# Patient Record
Sex: MALE | Race: WHITE | NOT HISPANIC OR LATINO | Employment: FULL TIME | URBAN - METROPOLITAN AREA
[De-identification: names, ages, dates, MRNs, and addresses within clinical notes are randomized per-mention and may not be internally consistent; named-entity substitution may affect disease eponyms.]

---

## 2017-12-19 ENCOUNTER — HOSPITAL ENCOUNTER (EMERGENCY)
Facility: CLINIC | Age: 52
Discharge: HOME | End: 2017-12-20
Attending: EMERGENCY MEDICINE | Admitting: EMERGENCY MEDICINE
Payer: COMMERCIAL

## 2017-12-19 DIAGNOSIS — R17 JAUNDICE: ICD-10-CM

## 2017-12-19 DIAGNOSIS — R79.89 ELEVATED LFTS: ICD-10-CM

## 2017-12-19 DIAGNOSIS — K80.20 GALLSTONES: ICD-10-CM

## 2017-12-19 DIAGNOSIS — R10.13 ABDOMINAL PAIN, EPIGASTRIC: ICD-10-CM

## 2017-12-19 LAB
ALBUMIN SERPL-MCNC: 3.3 G/DL (ref 3.4–5)
ALP SERPL-CCNC: 379 U/L (ref 40–150)
ALT SERPL W P-5'-P-CCNC: 453 U/L (ref 0–70)
AMMONIA PLAS-SCNC: 38 UMOL/L (ref 10–50)
ANION GAP SERPL CALCULATED.3IONS-SCNC: 5 MMOL/L (ref 3–14)
APTT PPP: 34 SEC (ref 22–37)
AST SERPL W P-5'-P-CCNC: 283 U/L (ref 0–45)
BASOPHILS # BLD AUTO: 0.1 10E9/L (ref 0–0.2)
BASOPHILS NFR BLD AUTO: 0.9 %
BILIRUB SERPL-MCNC: 2 MG/DL (ref 0.2–1.3)
BUN SERPL-MCNC: 10 MG/DL (ref 7–30)
CALCIUM SERPL-MCNC: 8.5 MG/DL (ref 8.5–10.1)
CHLORIDE SERPL-SCNC: 108 MMOL/L (ref 94–109)
CO2 SERPL-SCNC: 28 MMOL/L (ref 20–32)
CREAT SERPL-MCNC: 0.77 MG/DL (ref 0.66–1.25)
DIFFERENTIAL METHOD BLD: NORMAL
EOSINOPHIL # BLD AUTO: 0.2 10E9/L (ref 0–0.7)
EOSINOPHIL NFR BLD AUTO: 4 %
ERYTHROCYTE [DISTWIDTH] IN BLOOD BY AUTOMATED COUNT: 15 % (ref 10–15)
ETHANOL SERPL-MCNC: <0.01 G/DL
GFR SERPL CREATININE-BSD FRML MDRD: >90 ML/MIN/1.7M2
GLUCOSE SERPL-MCNC: 139 MG/DL (ref 70–99)
HCT VFR BLD AUTO: 42.3 % (ref 40–53)
HGB BLD-MCNC: 14.5 G/DL (ref 13.3–17.7)
IMM GRANULOCYTES # BLD: 0 10E9/L (ref 0–0.4)
IMM GRANULOCYTES NFR BLD: 0.3 %
INR PPP: 0.89 (ref 0.86–1.14)
LIPASE SERPL-CCNC: 187 U/L (ref 73–393)
LYMPHOCYTES # BLD AUTO: 1.7 10E9/L (ref 0.8–5.3)
LYMPHOCYTES NFR BLD AUTO: 29.7 %
MCH RBC QN AUTO: 30.1 PG (ref 26.5–33)
MCHC RBC AUTO-ENTMCNC: 34.3 G/DL (ref 31.5–36.5)
MCV RBC AUTO: 88 FL (ref 78–100)
MONOCYTES # BLD AUTO: 0.4 10E9/L (ref 0–1.3)
MONOCYTES NFR BLD AUTO: 7.3 %
NEUTROPHILS # BLD AUTO: 3.3 10E9/L (ref 1.6–8.3)
NEUTROPHILS NFR BLD AUTO: 57.8 %
NRBC # BLD AUTO: 0 10*3/UL
NRBC BLD AUTO-RTO: 0 /100
PLATELET # BLD AUTO: 261 10E9/L (ref 150–450)
POTASSIUM SERPL-SCNC: 3.9 MMOL/L (ref 3.4–5.3)
PROT SERPL-MCNC: 6.8 G/DL (ref 6.8–8.8)
RBC # BLD AUTO: 4.81 10E12/L (ref 4.4–5.9)
SODIUM SERPL-SCNC: 141 MMOL/L (ref 133–144)
TROPONIN I BLD-MCNC: 0 UG/L (ref 0–0.1)
WBC # BLD AUTO: 5.8 10E9/L (ref 4–11)

## 2017-12-19 PROCEDURE — 85730 THROMBOPLASTIN TIME PARTIAL: CPT | Performed by: EMERGENCY MEDICINE

## 2017-12-19 PROCEDURE — 99285 EMERGENCY DEPT VISIT HI MDM: CPT | Mod: 25

## 2017-12-19 PROCEDURE — 93005 ELECTROCARDIOGRAM TRACING: CPT

## 2017-12-19 PROCEDURE — 85025 COMPLETE CBC W/AUTO DIFF WBC: CPT | Performed by: EMERGENCY MEDICINE

## 2017-12-19 PROCEDURE — 84484 ASSAY OF TROPONIN QUANT: CPT

## 2017-12-19 PROCEDURE — 82140 ASSAY OF AMMONIA: CPT | Performed by: EMERGENCY MEDICINE

## 2017-12-19 PROCEDURE — 80053 COMPREHEN METABOLIC PANEL: CPT | Performed by: EMERGENCY MEDICINE

## 2017-12-19 PROCEDURE — 80329 ANALGESICS NON-OPIOID 1 OR 2: CPT | Performed by: EMERGENCY MEDICINE

## 2017-12-19 PROCEDURE — 80320 DRUG SCREEN QUANTALCOHOLS: CPT | Performed by: EMERGENCY MEDICINE

## 2017-12-19 PROCEDURE — 83690 ASSAY OF LIPASE: CPT | Performed by: EMERGENCY MEDICINE

## 2017-12-19 PROCEDURE — 85610 PROTHROMBIN TIME: CPT | Performed by: EMERGENCY MEDICINE

## 2017-12-19 RX ORDER — LIDOCAINE 40 MG/G
CREAM TOPICAL
Status: DISCONTINUED | OUTPATIENT
Start: 2017-12-19 | End: 2017-12-20 | Stop reason: HOSPADM

## 2017-12-19 ASSESSMENT — ENCOUNTER SYMPTOMS
JOINT SWELLING: 0
DIARRHEA: 1
VOMITING: 1
SEIZURES: 0
DIZZINESS: 0
MYALGIAS: 1
DIFFICULTY URINATING: 0
CONSTIPATION: 0
DYSURIA: 0
FATIGUE: 0
BACK PAIN: 0
NECK STIFFNESS: 0
PALPITATIONS: 0
NECK PAIN: 0
HEADACHES: 0
ANAL BLEEDING: 0
RECTAL PAIN: 0
PHOTOPHOBIA: 0
NAUSEA: 1
COUGH: 0
FEVER: 1
ARTHRALGIAS: 0
COLOR CHANGE: 0
CHEST TIGHTNESS: 0
HEMATURIA: 0
ABDOMINAL PAIN: 0
SHORTNESS OF BREATH: 0
BLOOD IN STOOL: 0
CHILLS: 0

## 2017-12-19 NOTE — ED AVS SNAPSHOT
[facility] Emergency Department    [address]    [facility] [address]    Phone:  [phone]    Fax:  [phone]                                       Contreras Pearson   MRN: [id number]    Department:  [facility] Emergency Department   Date of Visit:  12/19/2017           After Visit Summary Signature Page     I have received my discharge instructions, and my questions have been answered. I have discussed any challenges I see with this plan with the nurse or doctor.    ..........................................................................................................................................  Patient/Patient Representative Signature      ..........................................................................................................................................  Patient Representative Print Name and Relationship to Patient    ..................................................               ................................................  Date                                            Time    ..........................................................................................................................................  Reviewed by Signature/Title    ...................................................              ..............................................  Date                                                            Time

## 2017-12-19 NOTE — ED AVS SNAPSHOT
[facility] Emergency Department    [address]    [facility] [address]    Phone:  [phone]    Fax:  [phone]                                       Contreras Pearson   MRN: [id number]    Department:  [facility] Emergency Department   Date of Visit:  12/19/2017           Patient Information     Date Of Birth          1965        Your diagnoses for this visit were:     Gallstones     Jaundice     Elevated LFTs     Abdominal pain, epigastric        You were seen by Masoud Le MD.      Follow-up Information     Follow up with [facility], [facility]. Schedule an appointment as soon as possible for a visit in 2 days.    Contact information:    [address] . [facility] [id number] [phone]          Discharge Instructions       Discharge Instructions  Abdominal Pain    Abdominal pain can be caused by many things. Your evaluation today does not show the exact cause for your pain. Your doctor today has decided that it is unlikely your pain is due to a life threatening problem, or a problem requiring surgery or hospital admission. Sometimes those problems cannot be found right away, so it is very important that you follow up as directed.  Sometimes only the changes which occur over time allow the cause of your pain to be found.    Return to the Emergency Department for a recheck in 8-12 hours if your pain continues.  If your pain gets worse, changes in location, or feels different, return to the Emergency Department right away.    ADULTS:  Return to the Emergency Department right away if:      You get an oral temperature above 102oF or as directed by your doctor.    You have blood in your stools (bright red or black, tarry stools).    You keep throwing up or can t drink liquids.    You see blood when you throw up.    You can t have a bowel movement or you can t pass gas.    Your stomach gets bloated or bigger.    Your skin or the whites of your eyes look  yellow.    You faint.    You have bloody, frequent or painful urination.    You have new symptoms or anything that worries you.    CHILDREN:  Return to the Emergency Department right away if your child has any of the above-listed symptoms or the following:      Pushes your hand away or screams/cries when his/her belly is touched.    You notice your child is very fussy or weak.    Your child is very tired and is too tired to eat or drink.    Your child is dehydrated.  Signs of dehydration can be:  o Your infant has had no wet diapers in 4-5 hours.  o Your older child has not passed urine in 6-8 hours.  o Your infant or child starts to have dry mouth and lips, or no saliva or tears.    PREGNANT WOMEN:  Return to the Emergency Department right away if you have any of the above-listed symptoms or the following:      You have bleeding, leaking fluid or passing tissue from the vagina.    You have worse pain or cramping, or pain in your shoulder or back.    You have vomiting that will not stop.    You have painful or bloody urination.    You have a temperature of 100oF or more.    Your baby is not moving as much as usual.    You faint.    You get a bad headache with or without eye problems and abdominal pain.    You have a convulsion or seizure.    You have unusual discharge from your vagina and abdominal pain.    Abdominal pain is pretty common during pregnancy.  Your pain may or may not be related to your pregnancy. You should follow-up closely with your OB doctor so they can evaluate you and your baby.  Until you follow-up with your regular doctor, do the following:       Avoid sex and do not put anything in your vagina.    Drink clear fluids.    Only take medications approved by your doctor.    MORE INFORMATION:    Appendicitis:  A possible cause of abdominal pain in any person who still has their appendix is acute appendicitis. Appendicitis is often hard to diagnose.  Testing does not always rule out early  "appendicitis or other causes of abdominal pain. Close follow-up with your doctor and re-evaluations may be needed to figure out the reason for your abdominal pain.    Follow-up:  It is very important that you make an appointment with your clinic and go to the appointment.  If you do not follow-up with your primary doctor, it may result in missing an important development which could result in permanent injury or disability and/or lasting pain.  If there is any problem keeping your appointment, call your doctor or return to the Emergency Department.    Medications:  Take your medications as directed by your doctor today.  Before using over-the-counter medications, ask your doctor and make sure to take the medications as directed.  If you have any questions about medications, ask your doctor.    Diet:  Resume your normal diet as much as possible, but do not eat fried, fatty or spicy foods while you have pain.  Do not drink alcohol or have caffeine.  Do not smoke tobacco.    Probiotics: If you have been given an antibiotic, you may want to also take a probiotic pill or eat yogurt with live cultures. Probiotics have \"good bacteria\" to help your intestines stay healthy. Studies have shown that probiotics help prevent diarrhea and other intestine problems (including C. diff infection) when you take antibiotics. You can buy these without a prescription in the pharmacy section of the store.     If you were given a prescription for medicine here today, be sure to read all of the information (including the package insert) that comes with your prescription.  This will include important information about the medicine, its side effects, and any warnings that you need to know about.  The pharmacist who fills the prescription can provide more information and answer questions you may have about the medicine.  If you have questions or concerns that the pharmacist cannot address, please call or return to the Emergency Department. "         Opioid Medication Information    Pain medications are among the most commonly prescribed medicines, so we are including this information for all our patients. If you did not receive pain medication or get a prescription for pain medicine, you can ignore it.     You may have been given a prescription for an opioid (narcotic) pain medicine and/or have received a pain medicine while here in the Emergency Department. These medicines can make you drowsy or impaired. You must not drive, operate dangerous equipment, or engage in any other dangerous activities while taking these medications. If you drive while taking these medications, you could be arrested for DUI, or driving under the influence. Do not drink any alcohol while you are taking these medications.     Opioid pain medications can cause addiction. If you have a history of chemical dependency of any type, you are at a higher risk of becoming addicted to pain medications.  Only take these prescribed medications to treat your pain when all other options have been tried. Take it for as short a time and as few doses as possible. Store your pain pills in a secure place, as they are frequently stolen and provide a dangerous opportunity for children or visitors in your house to start abusing these powerful medications. We will not replace any lost or stolen medicine.  As soon as your pain is better, you should flush all your remaining medication.     Many prescription pain medications contain Tylenol  (acetaminophen), including Vicodin , Tylenol #3 , Norco , Lortab , and Percocet .  You should not take any extra pills of Tylenol  if you are using these prescription medications or you can get very sick.  Do not ever take more than 3000 mg of acetaminophen in any 24 hour period.    All opioids tend to cause constipation. Drink plenty of water and eat foods that have a lot of fiber, such as fruits, vegetables, prune juice, apple juice and high fiber cereal.  Take a  laxative if you don t move your bowels at least every other day. Miralax , Milk of Magnesia, Colace , or Senna  can be used to keep you regular.      Remember that you can always come back to the Emergency Department if you are not able to see your regular doctor in the amount of time listed above, if you get any new symptoms, or if there is anything that worries you.          24 Hour Appointment Hotline       To make an appointment at any [facility], call [phone] ([phone]). If you don't have a family doctor or clinic, we will help you find one. [facility] clinics are conveniently located to serve the needs of you and your family.             Review of your medicines      Our records show that you are taking the medicines listed below. If these are incorrect, please call your family doctor or clinic.        Dose / Directions Last dose taken    PANTOPRAZOLE SODIUM PO        Refills:  0        SUCRALFATE PO        Refills:  0                Procedures and tests performed during your visit     Abdomen US, limited (RUQ only)    Acetaminophen level    Alcohol ethyl    Ammonia    CBC with platelets differential    CT Abdomen Pelvis w Contrast    Comprehensive metabolic panel    EKG 12-lead, tracing only    INR    Lipase    Partial thromboplastin time    Troponin POCT    UA with Microscopic    XR Chest Port 1 View      Orders Needing Specimen Collection     None      Pending Results     Date and Time Order Name Status Description    12/19/2017 2314 EKG 12-lead, tracing only Preliminary             Pending Culture Results     No orders found for last 3 day(s).            Pending Results Instructions     If you had any lab results that were not finalized at the time of your Discharge, you can call the ED Lab Result RN at [phone]. You will be contacted by this team for any positive Lab results or changes in treatment. The nurses are available 7 days a week from 10A to 6:30P.  You can leave a message 24  hours per day and they will return your call.        Test Results From Your Hospital Stay        12/19/2017 11:32 PM      Component Results     Component Value Ref Range & Units Status    WBC 5.8 4.0 - 11.0 10e9/L Final    RBC Count 4.81 4.4 - 5.9 10e12/L Final    Hemoglobin 14.5 13.3 - 17.7 g/dL Final    Hematocrit 42.3 40.0 - 53.0 % Final    MCV 88 78 - 100 fl Final    MCH 30.1 26.5 - 33.0 pg Final    MCHC 34.3 31.5 - 36.5 g/dL Final    RDW 15.0 10.0 - 15.0 % Final    Platelet Count 261 150 - 450 10e9/L Final    Diff Method Automated Method  Final    % Neutrophils 57.8 % Final    % Lymphocytes 29.7 % Final    % Monocytes 7.3 % Final    % Eosinophils 4.0 % Final    % Basophils 0.9 % Final    % Immature Granulocytes 0.3 % Final    Nucleated RBCs 0 0 /100 Final    Absolute Neutrophil 3.3 1.6 - 8.3 10e9/L Final    Absolute Lymphocytes 1.7 0.8 - 5.3 10e9/L Final    Absolute Monocytes 0.4 0.0 - 1.3 10e9/L Final    Absolute Eosinophils 0.2 0.0 - 0.7 10e9/L Final    Absolute Basophils 0.1 0.0 - 0.2 10e9/L Final    Abs Immature Granulocytes 0.0 0 - 0.4 10e9/L Final    Absolute Nucleated RBC 0.0  Final         12/19/2017 11:48 PM      Component Results     Component Value Ref Range & Units Status    INR 0.89 0.86 - 1.14 Final         12/19/2017 11:48 PM      Component Results     Component Value Ref Range & Units Status    PTT 34 22 - 37 sec Final         12/19/2017 11:50 PM      Component Results     Component Value Ref Range & Units Status    Sodium 141 133 - 144 mmol/L Final    Potassium 3.9 3.4 - 5.3 mmol/L Final    Chloride 108 94 - 109 mmol/L Final    Carbon Dioxide 28 20 - 32 mmol/L Final    Anion Gap 5 3 - 14 mmol/L Final    Glucose 139 (H) 70 - 99 mg/dL Final    Urea Nitrogen 10 7 - 30 mg/dL Final    Creatinine 0.77 0.66 - 1.25 mg/dL Final    GFR Estimate >90 >60 mL/min/1.7m2 Final    Non  GFR Calc    GFR Estimate If Black >90 >60 mL/min/1.7m2 Final    African American GFR Calc    Calcium 8.5 8.5 -  10.1 mg/dL Final    Bilirubin Total 2.0 (H) 0.2 - 1.3 mg/dL Final    Albumin 3.3 (L) 3.4 - 5.0 g/dL Final    Protein Total 6.8 6.8 - 8.8 g/dL Final    Alkaline Phosphatase 379 (H) 40 - 150 U/L Final     (H) 0 - 70 U/L Final     (H) 0 - 45 U/L Final         12/19/2017 11:50 PM      Component Results     Component Value Ref Range & Units Status    Lipase 187 73 - 393 U/L Final         12/19/2017 11:47 PM      Component Results     Component Value Ref Range & Units Status    Ammonia 38 10 - 50 umol/L Final         12/19/2017 11:50 PM      Component Results     Component Value Ref Range & Units Status    Ethanol g/dL <0.01 <0.01 g/dL Final         12/20/2017 12:28 AM      Component Results     Component Value Ref Range & Units Status    Color Urine Claudia  Final    Appearance Urine Clear  Final    Glucose Urine Negative NEG^Negative mg/dL Final    Bilirubin Urine Negative NEG^Negative Final    Ketones Urine Negative NEG^Negative mg/dL Final    Specific Gravity Urine 1.016 1.003 - 1.035 Final    Blood Urine Negative NEG^Negative Final    pH Urine 5.0 5.0 - 7.0 pH Final    Protein Albumin Urine Negative NEG^Negative mg/dL Final    Urobilinogen mg/dL 0.0 0.0 - 2.0 mg/dL Final    Nitrite Urine Negative NEG^Negative Final    Leukocyte Esterase Urine Negative NEG^Negative Final    Source Midstream Urine  Final    WBC Urine 1 0 - 2 /HPF Final    RBC Urine 0 0 - 2 /HPF Final    Mucous Urine Present (A) NEG^Negative /LPF Final         12/20/2017  1:29 AM      Narrative     ULTRASOUND ABDOMEN LIMITED RIGHT UPPER QUADRANT  12/20/2017 1:10 AM     HISTORY: Jaundice. Abdominal pain.    COMPARISON: None.    FINDINGS: Normal hepatic echogenicity. No hepatic masses. 0.7 cm  gallstone in the gallbladder. No gallbladder wall thickening or  pericholecystic fluid. No focal tenderness over the gallbladder. No  intra- or extrahepatic biliary dilatation. The common duct measures  0.6 cm in diameter, at the upper limits of normal  in caliber. The  right kidney has normal size and echogenicity, measuring 12.3 cm in  length. No right intrarenal collecting system dilatation, calculi or  masses. No free fluid in the upper right hemiabdomen.        Impression     IMPRESSION:  1. Small gallstone in the gallbladder. No evidence of acute  cholecystitis.  2. No biliary dilatation.    ADOLPH TINOCO MD         12/20/2017  1:47 AM      Narrative     CHEST SINGLE VIEW   12/20/2017 1:22 AM     HISTORY: Jaundice.    COMPARISON: None.    FINDINGS: The lungs are clear. Normal-sized cardiac silhouette.        Impression     IMPRESSION: No evidence of active cardiopulmonary disease.    ADOLPH TINOCO MD         12/19/2017 11:40 PM      Component Results     Component Value Ref Range & Units Status    Troponin I 0.00 0.00 - 0.10 ug/L Final         12/20/2017  1:02 AM      Component Results     Component Value Ref Range & Units Status    Acetaminophen Level <2 mg/L Final    Therapeutic range: 10-20 mg/L         12/20/2017  3:44 AM      Narrative     CT ABDOMEN AND PELVIS WITH CONTRAST  12/20/2017 3:12 AM     HISTORY: Epigastric abdominal pain. Jaundice. Elevated liver function  tests.    COMPARISON: 12/20/2017 - Ultrasound right upper quadrant.    TECHNIQUE: Following the uneventful administration of 100 mL  Isovue-370 intravenous contrast, helical sections were acquired from  the top of the diaphragm through the pubic symphysis. Coronal  reconstructions were generated. Radiation dose for this scan was  reduced using automated exposure control, adjustment of the mA and/or  kV according to the patient's size, or iterative reconstruction  technique.    FINDINGS:  Abdomen: The liver, spleen, pancreas, adrenal glands and right kidney  are unremarkable. Subcentimeter low attenuation lesion in the inferior  pole of the left kidney, too small to characterize. Tiny 0.5 cm  gallstone in the gallbladder. No intra- or extrahepatic biliary  dilatation. No enlarged lymph  nodes or free fluid in the upper  abdomen. Atherosclerotic calcification in the abdominal aorta.    Scan through the lower chest is unremarkable.    Pelvis: The small and large bowel are normal in caliber. A few colonic  diverticula are present without evidence of diverticulitis The  appendix is unremarkable. No bowel wall thickening, pneumatosis or  free intraperitoneal gas. No enlarged lymph nodes or free fluid in the  pelvis.        Impression     IMPRESSION:  1. No cause of acute pain identified in the abdomen or pelvis.  2. No evidence of biliary obstruction.  3. Tiny gallstone in the gallbladder.    ADOLPH TINOCO MD                Clinical Quality Measure: Blood Pressure Screening     Your blood pressure was checked while you were in the emergency department today. The last reading we obtained was  BP: 128/86 . Please read the guidelines below about what these numbers mean and what you should do about them.  If your systolic blood pressure (the top number) is less than 120 and your diastolic blood pressure (the bottom number) is less than 80, then your blood pressure is normal. There is nothing more that you need to do about it.  If your systolic blood pressure (the top number) is 120-139 or your diastolic blood pressure (the bottom number) is 80-89, your blood pressure may be higher than it should be. You should have your blood pressure rechecked within a year by a primary care provider.  If your systolic blood pressure (the top number) is 140 or greater or your diastolic blood pressure (the bottom number) is 90 or greater, you may have high blood pressure. High blood pressure is treatable, but if left untreated over time it can put you at risk for heart attack, stroke, or kidney failure. You should have your blood pressure rechecked by a primary care provider within the next 4 weeks.  If your provider in the emergency department today gave you specific instructions to follow-up with your doctor or provider  "even sooner than that, you should follow that instruction and not wait for up to 4 weeks for your follow-up visit.        Thank you for choosing [facility]       Thank you for choosing [facility] for your care. Our goal is always to provide you with excellent care. Hearing back from our patients is one way we can continue to improve our services. Please take a few minutes to complete the written survey that you may receive in the mail after you visit with us. Thank you!        [employer] Information     [employer] lets you send messages to your doctor, view your test results, renew your prescriptions, schedule appointments and more. To sign up, go to [email or web address] . Click on \"Log in\" on the left side of the screen, which will take you to the Welcome page. Then click on \"Sign up Now\" on the right side of the page.     You will be asked to enter the access code listed below, as well as some personal information. Please follow the directions to create your username and password.     Your access code is: [email or web address]  Expires: 3/20/2018  4:10 AM     Your access code will  in 90 days. If you need help or a new code, please call your [facility] clinic or [phone].        Care EveryWhere ID     This is your Care EveryWhere ID. This could be used by other organizations to access your [facility] medical records  [id number]        Equal Access to Services     TIFFANIE MUÑIZ : Hadii sonya Lea, waaxda ignacio, qaybta kaalmada ankur, mami rodrigues. So [facility] [phone].    ATENCIÓN: Si habla español, tiene a anne disposición servicios gratuitos de asistencia lingüística. Nikhil al [phone].    We comply with applicable federal civil rights laws and [place] laws. We do not discriminate on the basis of race, color, national origin, age, disability, sex, sexual orientation, or gender identity.            After Visit Summary       This is your record. Keep this with you and " show to your community pharmacist(s) and doctor(s) at your next visit.

## 2017-12-20 ENCOUNTER — APPOINTMENT (OUTPATIENT)
Dept: ULTRASOUND IMAGING | Facility: CLINIC | Age: 52
End: 2017-12-20
Attending: EMERGENCY MEDICINE
Payer: COMMERCIAL

## 2017-12-20 ENCOUNTER — APPOINTMENT (OUTPATIENT)
Dept: GENERAL RADIOLOGY | Facility: CLINIC | Age: 52
End: 2017-12-20
Attending: EMERGENCY MEDICINE
Payer: COMMERCIAL

## 2017-12-20 ENCOUNTER — APPOINTMENT (OUTPATIENT)
Dept: CT IMAGING | Facility: CLINIC | Age: 52
End: 2017-12-20
Attending: EMERGENCY MEDICINE
Payer: COMMERCIAL

## 2017-12-20 VITALS
DIASTOLIC BLOOD PRESSURE: 89 MMHG | SYSTOLIC BLOOD PRESSURE: 138 MMHG | TEMPERATURE: 98.8 F | HEART RATE: 77 BPM | RESPIRATION RATE: 18 BRPM | OXYGEN SATURATION: 96 %

## 2017-12-20 LAB
ALBUMIN UR-MCNC: NEGATIVE MG/DL
APAP SERPL-MCNC: <2 MG/L (ref 10–20)
APPEARANCE UR: CLEAR
BILIRUB UR QL STRIP: NEGATIVE
COLOR UR AUTO: ABNORMAL
GLUCOSE UR STRIP-MCNC: NEGATIVE MG/DL
HGB UR QL STRIP: NEGATIVE
INTERPRETATION ECG - MUSE: NORMAL
KETONES UR STRIP-MCNC: NEGATIVE MG/DL
LEUKOCYTE ESTERASE UR QL STRIP: NEGATIVE
MUCOUS THREADS #/AREA URNS LPF: PRESENT /LPF
NITRATE UR QL: NEGATIVE
PH UR STRIP: 5 PH (ref 5–7)
RBC #/AREA URNS AUTO: 0 /HPF (ref 0–2)
SOURCE: ABNORMAL
SP GR UR STRIP: 1.02 (ref 1–1.03)
UROBILINOGEN UR STRIP-MCNC: 0 MG/DL (ref 0–2)
WBC #/AREA URNS AUTO: 1 /HPF (ref 0–2)

## 2017-12-20 PROCEDURE — 74177 CT ABD & PELVIS W/CONTRAST: CPT

## 2017-12-20 PROCEDURE — 25000128 H RX IP 250 OP 636: Performed by: EMERGENCY MEDICINE

## 2017-12-20 PROCEDURE — 76705 ECHO EXAM OF ABDOMEN: CPT

## 2017-12-20 PROCEDURE — 80329 ANALGESICS NON-OPIOID 1 OR 2: CPT | Performed by: EMERGENCY MEDICINE

## 2017-12-20 PROCEDURE — 81001 URINALYSIS AUTO W/SCOPE: CPT | Performed by: EMERGENCY MEDICINE

## 2017-12-20 PROCEDURE — 71010 XR CHEST PORT 1 VW: CPT

## 2017-12-20 RX ORDER — IOPAMIDOL 755 MG/ML
500 INJECTION, SOLUTION INTRAVASCULAR ONCE
Status: COMPLETED | OUTPATIENT
Start: 2017-12-20 | End: 2017-12-20

## 2017-12-20 RX ADMIN — SODIUM CHLORIDE 65 ML: 9 INJECTION, SOLUTION INTRAVENOUS at 03:00

## 2017-12-20 RX ADMIN — IOPAMIDOL 100 ML: 755 INJECTION, SOLUTION INTRAVENOUS at 03:00

## 2017-12-20 NOTE — ED PROVIDER NOTES
"  History     Chief Complaint:  Jaundice    HPI   Contreras Peasron is a 52 year old male who presents with jaundice. The patient reports that he has a history of acid reflux and has been worked up for this in the past which showed no cardiac complications. The patient stets that he chronically gets acid reflux in his upper abdomen/middle chest at a 1/10 which goes up to a 3/10. As of recently he has developed mid sternal chest pains which he states is different from his chronic acid reflux. He states that this pain has been accompanied by nausea, weight loss, diarrhea, vomiting, subjective fevers, and general body aches. He states [place] [facility] [place] blood [place]; the urine showed that he was \"eating his own proteins\" and his blood work showed liver inflammation. He was referred to come to the ER if he developed jaundice of the skin or eyes. Today he presents with jaundice to both of these areas but denies any other symptom other than the aforementioned.     CMP Clinic 12/15/17:  Bilirubin  3.4 (H)  ALKPHOS 316  ALT  421  AST  206    Allergies:  Penicillins  Sulfa Drugs     Medications:    SUCRALFATE PO  PANTOPRAZOLE SODIUM PO    Past Medical History:    Acid Reflux    Past Surgical History:    No pertinent past surgical history.    Family History:    No pertinent family history.    Social History:  Smoking status: Current smoker  Alcohol use: Yes (very minimal)  Marital Status:       Review of Systems   Constitutional: Positive for fever. Negative for chills and fatigue.   Eyes: Negative for photophobia and visual disturbance.   Respiratory: Negative for cough, chest tightness and shortness of breath.    Cardiovascular: Positive for chest pain. Negative for palpitations and leg swelling.   Gastrointestinal: Positive for diarrhea, nausea and vomiting. Negative for abdominal pain, anal bleeding, blood in stool, constipation and rectal pain.   Genitourinary: Negative for decreased urine " volume, difficulty urinating, dysuria, hematuria, scrotal swelling and testicular pain.   Musculoskeletal: Positive for myalgias. Negative for arthralgias, back pain, gait problem, joint swelling, neck pain and neck stiffness.   Skin: Negative for color change and rash.   Neurological: Negative for dizziness, seizures and headaches.   All other systems reviewed and are negative.    Physical Exam     Patient Vitals for the past 24 hrs:   BP Temp Temp src Pulse Resp SpO2   12/20/17 0416 - - - - - 96 %   12/20/17 0415 138/89 - - - - -   12/20/17 0400 128/86 - - - - -   12/20/17 0345 133/81 - - - - 94 %   12/20/17 0330 137/85 - - - - 96 %   12/20/17 0326 - - - - - 98 %   12/20/17 0325 133/82 - - - - -   12/20/17 0230 - - - - - 97 %   12/19/17 2358 - - - - - 97 %   12/19/17 2356 116/81 - - - - 95 %   12/19/17 2343 - - - - - 96 %   12/19/17 2324 - - - - - 97 %   12/19/17 2311 - - - - - 99 %   12/19/17 2303 - - - - - 99 %   12/19/17 2301 126/85 - - - - -   12/19/17 2051 (!) 154/95 98.8  F (37.1  C) Temporal 77 18 100 %       Physical Exam  General: The patient is alert, in no respiratory distress.    HENT: Mucous membranes moist. Mild scleral icterus.     Cardiovascular: Regular rate and rhythm. Good pulses in all four extremities. Normal capillary refill and skin turgor.     Respiratory: Lungs are clear. No nasal flaring. No retractions. No wheezing, no crackles.    Gastrointestinal: Abdomen soft. No guarding, no rebound. No palpable hernias. Mild abdominal tenderness    Musculoskeletal: No gross deformity.     Skin: No rashes or petechiae.     Neurologic: The patient is alert and oriented x3. GCS 15. No testable cranial nerve deficit. Follows commands with clear and appropriate speech. Gives appropriate answers. Good strength in all extremities. No gross neurologic deficit. Gross sensation intact. Pupils are round and reactive. No meningismus.     Lymphatic: No cervical adenopathy. No lower extremity  swelling.    Psychiatric: The patient is non-tearful.      Emergency Department Course   ECG:  @ 2338  Indication: Jaundice  Vent. Rate 66 bpm. VA interval 146 ms. QRS duration 96 ms. QT/QTc 400/419 ms. P-R-T axis 60 20 25.   Normal sinus rhythm. Normal ECG.    Read @ 2348 by Dr. Le.    Imaging:  XR Chest Port 1 view  IMPRESSION:  1. No cause of acute pain identified in the abdomen or pelvis.  2. No evidence of biliary obstruction.  3. Tiny gallstone in the gallbladder.  Report per radiology.     Abdomen U, limited:  IMPRESSION:  1. Small gallstone in the gallbladder. No evidence of acute  cholecystitis.  2. No biliary dilatation.  Report per radiology.    Abdomen/Pelvis CT with IV contrast:  IMPRESSION:  1. No cause of acute pain identified in the abdomen or pelvis.  2. No evidence of biliary obstruction.  3. Tiny gallstone in the gallbladder.  Report per radiology.     Laboratory:  CBC:  WBC 5.8, HGB 14.5, , WNL  CMP: Glucose 139 (H), Bilirubin 2.0 (H), Albumin 3.3 (L), ALKPHOS 379 (H),  (H),  (H) otherwise WNL (Creatinine 0.77)  Lipase: 187  INR: 0.89  PTT: 34  Ammonia: 38   Acetaminophen: <2  (2300) BAC: <0.01  (232) Troponin POCT: 0.00    UA: Clear Claudia urine, Mucous Present, otherwise WNL    Interventions:  (0256) Normal Saline, 1 liter, IV bolus    Emergency Department Course:  Nursing notes and vitals reviewed.  (2305) I performed an exam of the patient as documented above.    Blood was drawn from the patient. This was sent for laboratory testing, findings above.   Urine sample was obtained and sent for laboratory analysis, findings above.  The patient was sent for a x-ray, ultrasound, and CT scan while in the emergency department, findings above.   EKG was done, interpretation as above.    (0214) I spoke with Dr. Garcia, the doctor on-call for GI about the patient's condition. She recommended a CT scan for the patient at this time.    Findings and plan explained to the patient.  Patient discharged home with instructions regarding supportive care, medications, and reasons to return. The importance of close follow-up was reviewed.   Impression & Plan    Medical Decision Making:  Contreras Pearson is a 52 year old male who has had elevated LFT's and urine bilirubin at his primary care doctor and was told to come to the ER if he developed jaundice. He currently has scleral icterus, complaining of abdominal pain, and what sounds like gastric reflux. I asked about alcohol use, checked a Tylenol level, there has been no trauma or direct trauma to the liver. I also consulted GI. The patient's workup here has been negative except for the elevated total bilirubin as well as LFT's. It does not appear to be hemolysis and his hemoglobin has been adequate and there has been no other symptoms to suggest this. There is a gallstone which may be causing intermittent obstruction but no signs of any obstructive process on the CT scan or ultrasound. Pain is under control and felt appropriate by myself, and GI as well, to have the patient follow up with GI and primary as an outpatient. He is doing quite well with no significant pain but he agrees to return of her develops any new or worsening symptoms.    Diagnosis:    ICD-10-CM   1. Gallstones K80.20   2. Jaundice R17   3. Elevated LFTs R79.89   4. Abdominal pain, epigastric R10.13       Disposition:  Patient is discharged to home.        I, José Manuel Schultz, am serving as a scribe on 12/19/2017 at 11:05 PM to personally document services performed by Dr. Le based on my observations and the provider's statements to me.      José Manuel Schultz  12/19/2017   [facility] EMERGENCY DEPARTMENT       Masoud Le MD  12/20/17 0451       Masoud Le MD  12/20/17 0452

## 2017-12-20 NOTE — ED NOTES
Patient comes in for evaluation of jaundice. Patient was seen by PCP (osmar) for epigastric pain, had lab work and was scheduled for an US, was called after lab results and was told his US was changed to a CT and to report to ED if he had any yellowing of skin. ABCs intact.

## 2017-12-21 ENCOUNTER — HOSPITAL ENCOUNTER (INPATIENT)
Facility: CLINIC | Age: 52
LOS: 1 days | Discharge: HOME | End: 2017-12-22
Attending: EMERGENCY MEDICINE | Admitting: INTERNAL MEDICINE
Payer: COMMERCIAL

## 2017-12-21 ENCOUNTER — APPOINTMENT (OUTPATIENT)
Dept: ULTRASOUND IMAGING | Facility: CLINIC | Age: 52
End: 2017-12-21
Attending: EMERGENCY MEDICINE
Payer: COMMERCIAL

## 2017-12-21 DIAGNOSIS — K80.50 CHOLEDOCHOLITHIASIS: ICD-10-CM

## 2017-12-21 DIAGNOSIS — K85.10 ACUTE BILIARY PANCREATITIS, UNSPECIFIED COMPLICATION STATUS: ICD-10-CM

## 2017-12-21 LAB
ALBUMIN SERPL-MCNC: 3.9 G/DL (ref 3.4–5)
ALBUMIN UR-MCNC: NEGATIVE MG/DL
ALP SERPL-CCNC: 497 U/L (ref 40–150)
ALT SERPL W P-5'-P-CCNC: 589 U/L (ref 0–70)
ANION GAP SERPL CALCULATED.3IONS-SCNC: 11 MMOL/L (ref 3–14)
APPEARANCE UR: CLEAR
AST SERPL W P-5'-P-CCNC: 357 U/L (ref 0–45)
BASOPHILS # BLD AUTO: 0.1 10E9/L (ref 0–0.2)
BASOPHILS NFR BLD AUTO: 0.5 %
BILIRUB SERPL-MCNC: 3.4 MG/DL (ref 0.2–1.3)
BILIRUB UR QL STRIP: NEGATIVE
BUN SERPL-MCNC: 11 MG/DL (ref 7–30)
CALCIUM SERPL-MCNC: 8.9 MG/DL (ref 8.5–10.1)
CHLORIDE SERPL-SCNC: 106 MMOL/L (ref 94–109)
CO2 SERPL-SCNC: 24 MMOL/L (ref 20–32)
COLOR UR AUTO: YELLOW
CREAT SERPL-MCNC: 0.9 MG/DL (ref 0.66–1.25)
DIFFERENTIAL METHOD BLD: ABNORMAL
EOSINOPHIL # BLD AUTO: 0.4 10E9/L (ref 0–0.7)
EOSINOPHIL NFR BLD AUTO: 1.5 %
ERYTHROCYTE [DISTWIDTH] IN BLOOD BY AUTOMATED COUNT: 15.4 % (ref 10–15)
GFR SERPL CREATININE-BSD FRML MDRD: 89 ML/MIN/1.7M2
GLUCOSE SERPL-MCNC: 232 MG/DL (ref 70–99)
GLUCOSE UR STRIP-MCNC: 50 MG/DL
HCT VFR BLD AUTO: 48.4 % (ref 40–53)
HGB BLD-MCNC: 16.5 G/DL (ref 13.3–17.7)
HGB UR QL STRIP: NEGATIVE
IMM GRANULOCYTES # BLD: 0.2 10E9/L (ref 0–0.4)
IMM GRANULOCYTES NFR BLD: 0.7 %
INR PPP: 0.89 (ref 0.86–1.14)
KETONES UR STRIP-MCNC: NEGATIVE MG/DL
LACTATE SERPL-SCNC: 1.4 MMOL/L (ref 0.4–2)
LEUKOCYTE ESTERASE UR QL STRIP: NEGATIVE
LIPASE SERPL-CCNC: ABNORMAL U/L (ref 73–393)
LYMPHOCYTES # BLD AUTO: 4.6 10E9/L (ref 0.8–5.3)
LYMPHOCYTES NFR BLD AUTO: 18.6 %
MCH RBC QN AUTO: 29.7 PG (ref 26.5–33)
MCHC RBC AUTO-ENTMCNC: 34.1 G/DL (ref 31.5–36.5)
MCV RBC AUTO: 87 FL (ref 78–100)
MONOCYTES # BLD AUTO: 1.6 10E9/L (ref 0–1.3)
MONOCYTES NFR BLD AUTO: 6.6 %
MUCOUS THREADS #/AREA URNS LPF: PRESENT /LPF
NEUTROPHILS # BLD AUTO: 17.6 10E9/L (ref 1.6–8.3)
NEUTROPHILS NFR BLD AUTO: 72.1 %
NITRATE UR QL: NEGATIVE
NRBC # BLD AUTO: 0 10*3/UL
NRBC BLD AUTO-RTO: 0 /100
PH UR STRIP: 5 PH (ref 5–7)
PLATELET # BLD AUTO: 375 10E9/L (ref 150–450)
PLATELET # BLD EST: ABNORMAL 10*3/UL
POTASSIUM SERPL-SCNC: 3.3 MMOL/L (ref 3.4–5.3)
PROT SERPL-MCNC: 7.7 G/DL (ref 6.8–8.8)
RBC # BLD AUTO: 5.56 10E12/L (ref 4.4–5.9)
RBC #/AREA URNS AUTO: 1 /HPF (ref 0–2)
RBC MORPH BLD: ABNORMAL
SODIUM SERPL-SCNC: 141 MMOL/L (ref 133–144)
SOURCE: ABNORMAL
SP GR UR STRIP: 1.01 (ref 1–1.03)
UROBILINOGEN UR STRIP-MCNC: 0 MG/DL (ref 0–2)
WBC # BLD AUTO: 24.5 10E9/L (ref 4–11)
WBC #/AREA URNS AUTO: <1 /HPF (ref 0–2)

## 2017-12-21 PROCEDURE — 12000000 ZZH R&B MED SURG/OB

## 2017-12-21 PROCEDURE — 96375 TX/PRO/DX INJ NEW DRUG ADDON: CPT

## 2017-12-21 PROCEDURE — 83605 ASSAY OF LACTIC ACID: CPT | Performed by: EMERGENCY MEDICINE

## 2017-12-21 PROCEDURE — 96361 HYDRATE IV INFUSION ADD-ON: CPT

## 2017-12-21 PROCEDURE — 96374 THER/PROPH/DIAG INJ IV PUSH: CPT

## 2017-12-21 PROCEDURE — 85025 COMPLETE CBC W/AUTO DIFF WBC: CPT | Performed by: EMERGENCY MEDICINE

## 2017-12-21 PROCEDURE — 27210995 ZZH RX 272: Performed by: EMERGENCY MEDICINE

## 2017-12-21 PROCEDURE — 85610 PROTHROMBIN TIME: CPT | Performed by: EMERGENCY MEDICINE

## 2017-12-21 PROCEDURE — 25000128 H RX IP 250 OP 636: Performed by: INTERNAL MEDICINE

## 2017-12-21 PROCEDURE — 93005 ELECTROCARDIOGRAM TRACING: CPT

## 2017-12-21 PROCEDURE — 99223 1ST HOSP IP/OBS HIGH 75: CPT | Mod: AI | Performed by: INTERNAL MEDICINE

## 2017-12-21 PROCEDURE — 76700 US EXAM ABDOM COMPLETE: CPT

## 2017-12-21 PROCEDURE — 80053 COMPREHEN METABOLIC PANEL: CPT | Performed by: EMERGENCY MEDICINE

## 2017-12-21 PROCEDURE — 25000128 H RX IP 250 OP 636: Performed by: EMERGENCY MEDICINE

## 2017-12-21 PROCEDURE — 83690 ASSAY OF LIPASE: CPT | Performed by: EMERGENCY MEDICINE

## 2017-12-21 PROCEDURE — 81001 URINALYSIS AUTO W/SCOPE: CPT | Performed by: EMERGENCY MEDICINE

## 2017-12-21 PROCEDURE — 99285 EMERGENCY DEPT VISIT HI MDM: CPT | Mod: 25

## 2017-12-21 PROCEDURE — 25000132 ZZH RX MED GY IP 250 OP 250 PS 637: Performed by: INTERNAL MEDICINE

## 2017-12-21 PROCEDURE — 96376 TX/PRO/DX INJ SAME DRUG ADON: CPT

## 2017-12-21 PROCEDURE — 25000125 ZZHC RX 250: Performed by: INTERNAL MEDICINE

## 2017-12-21 RX ORDER — SODIUM CHLORIDE 9 MG/ML
1000 INJECTION, SOLUTION INTRAVENOUS CONTINUOUS
Status: DISCONTINUED | OUTPATIENT
Start: 2017-12-21 | End: 2017-12-21

## 2017-12-21 RX ORDER — ONDANSETRON 4 MG/1
4 TABLET, ORALLY DISINTEGRATING ORAL EVERY 6 HOURS PRN
Status: DISCONTINUED | OUTPATIENT
Start: 2017-12-21 | End: 2017-12-22 | Stop reason: HOSPADM

## 2017-12-21 RX ORDER — DEXTROSE MONOHYDRATE, SODIUM CHLORIDE, AND POTASSIUM CHLORIDE 50; 1.49; 9 G/1000ML; G/1000ML; G/1000ML
INJECTION, SOLUTION INTRAVENOUS CONTINUOUS
Status: CANCELLED | OUTPATIENT
Start: 2017-12-21

## 2017-12-21 RX ORDER — PROCHLORPERAZINE 25 MG
25 SUPPOSITORY, RECTAL RECTAL EVERY 12 HOURS PRN
Status: DISCONTINUED | OUTPATIENT
Start: 2017-12-21 | End: 2017-12-22 | Stop reason: HOSPADM

## 2017-12-21 RX ORDER — PROCHLORPERAZINE MALEATE 5 MG
10 TABLET ORAL EVERY 6 HOURS PRN
Status: DISCONTINUED | OUTPATIENT
Start: 2017-12-21 | End: 2017-12-22 | Stop reason: HOSPADM

## 2017-12-21 RX ORDER — POTASSIUM CHLORIDE 1.5 G/1.58G
20-40 POWDER, FOR SOLUTION ORAL
Status: DISCONTINUED | OUTPATIENT
Start: 2017-12-21 | End: 2017-12-22 | Stop reason: HOSPADM

## 2017-12-21 RX ORDER — ONDANSETRON 2 MG/ML
4 INJECTION INTRAMUSCULAR; INTRAVENOUS EVERY 6 HOURS PRN
Status: DISCONTINUED | OUTPATIENT
Start: 2017-12-21 | End: 2017-12-22 | Stop reason: HOSPADM

## 2017-12-21 RX ORDER — MORPHINE SULFATE 4 MG/ML
4 INJECTION, SOLUTION INTRAMUSCULAR; INTRAVENOUS
Status: COMPLETED | OUTPATIENT
Start: 2017-12-21 | End: 2017-12-21

## 2017-12-21 RX ORDER — OXYCODONE HYDROCHLORIDE 5 MG/1
5-10 TABLET ORAL
Status: DISCONTINUED | OUTPATIENT
Start: 2017-12-21 | End: 2017-12-22 | Stop reason: HOSPADM

## 2017-12-21 RX ORDER — POTASSIUM CL/LIDO/0.9 % NACL 10MEQ/0.1L
10 INTRAVENOUS SOLUTION, PIGGYBACK (ML) INTRAVENOUS
Status: DISCONTINUED | OUTPATIENT
Start: 2017-12-21 | End: 2017-12-22 | Stop reason: HOSPADM

## 2017-12-21 RX ORDER — POTASSIUM CHLORIDE 7.45 MG/ML
10 INJECTION INTRAVENOUS
Status: DISCONTINUED | OUTPATIENT
Start: 2017-12-21 | End: 2017-12-22 | Stop reason: HOSPADM

## 2017-12-21 RX ORDER — HYDROMORPHONE HYDROCHLORIDE 1 MG/ML
.3-.5 INJECTION, SOLUTION INTRAMUSCULAR; INTRAVENOUS; SUBCUTANEOUS
Status: DISCONTINUED | OUTPATIENT
Start: 2017-12-21 | End: 2017-12-22 | Stop reason: HOSPADM

## 2017-12-21 RX ORDER — NALOXONE HYDROCHLORIDE 0.4 MG/ML
.1-.4 INJECTION, SOLUTION INTRAMUSCULAR; INTRAVENOUS; SUBCUTANEOUS
Status: DISCONTINUED | OUTPATIENT
Start: 2017-12-21 | End: 2017-12-22 | Stop reason: HOSPADM

## 2017-12-21 RX ORDER — SODIUM CHLORIDE 9 MG/ML
INJECTION, SOLUTION INTRAVENOUS CONTINUOUS
Status: DISCONTINUED | OUTPATIENT
Start: 2017-12-21 | End: 2017-12-22 | Stop reason: HOSPADM

## 2017-12-21 RX ORDER — POTASSIUM CHLORIDE 1500 MG/1
20-40 TABLET, EXTENDED RELEASE ORAL
Status: DISCONTINUED | OUTPATIENT
Start: 2017-12-21 | End: 2017-12-22 | Stop reason: HOSPADM

## 2017-12-21 RX ORDER — ONDANSETRON 2 MG/ML
4 INJECTION INTRAMUSCULAR; INTRAVENOUS EVERY 30 MIN PRN
Status: DISCONTINUED | OUTPATIENT
Start: 2017-12-21 | End: 2017-12-21

## 2017-12-21 RX ORDER — POTASSIUM CHLORIDE 29.8 MG/ML
20 INJECTION INTRAVENOUS
Status: DISCONTINUED | OUTPATIENT
Start: 2017-12-21 | End: 2017-12-22 | Stop reason: HOSPADM

## 2017-12-21 RX ORDER — NICOTINE 21 MG/24HR
1 PATCH, TRANSDERMAL 24 HOURS TRANSDERMAL DAILY
Status: DISCONTINUED | OUTPATIENT
Start: 2017-12-21 | End: 2017-12-22 | Stop reason: HOSPADM

## 2017-12-21 RX ADMIN — FAMOTIDINE 20 MG: 10 INJECTION, SOLUTION INTRAVENOUS at 20:56

## 2017-12-21 RX ADMIN — SODIUM CHLORIDE 1000 ML: 9 INJECTION, SOLUTION INTRAVENOUS at 17:08

## 2017-12-21 RX ADMIN — MORPHINE SULFATE 4 MG: 4 INJECTION INTRAVENOUS at 17:08

## 2017-12-21 RX ADMIN — SODIUM CHLORIDE: 9 INJECTION, SOLUTION INTRAVENOUS at 20:49

## 2017-12-21 RX ADMIN — MORPHINE SULFATE 4 MG: 4 INJECTION INTRAVENOUS at 17:40

## 2017-12-21 RX ADMIN — MORPHINE SULFATE 4 MG: 4 INJECTION INTRAVENOUS at 18:26

## 2017-12-21 RX ADMIN — Medication 10 MEQ: at 21:56

## 2017-12-21 RX ADMIN — SODIUM CHLORIDE 1000 ML: 9 INJECTION, SOLUTION INTRAVENOUS at 18:25

## 2017-12-21 RX ADMIN — NICOTINE 1 PATCH: 21 PATCH, EXTENDED RELEASE TRANSDERMAL at 22:00

## 2017-12-21 RX ADMIN — WATER 1 G: 1 INJECTION INTRAMUSCULAR; INTRAVENOUS; SUBCUTANEOUS at 18:38

## 2017-12-21 RX ADMIN — Medication 10 MEQ: at 22:57

## 2017-12-21 RX ADMIN — HYDROMORPHONE HYDROCHLORIDE 1 MG: 1 INJECTION, SOLUTION INTRAMUSCULAR; INTRAVENOUS; SUBCUTANEOUS at 19:53

## 2017-12-21 RX ADMIN — PROCHLORPERAZINE EDISYLATE 10 MG: 5 INJECTION INTRAMUSCULAR; INTRAVENOUS at 21:29

## 2017-12-21 RX ADMIN — HYDROMORPHONE HYDROCHLORIDE 0.5 MG: 1 INJECTION, SOLUTION INTRAMUSCULAR; INTRAVENOUS; SUBCUTANEOUS at 20:56

## 2017-12-21 RX ADMIN — ONDANSETRON 4 MG: 2 INJECTION INTRAMUSCULAR; INTRAVENOUS at 17:08

## 2017-12-21 RX ADMIN — HYDROMORPHONE HYDROCHLORIDE 0.5 MG: 1 INJECTION, SOLUTION INTRAMUSCULAR; INTRAVENOUS; SUBCUTANEOUS at 22:57

## 2017-12-21 ASSESSMENT — ACTIVITIES OF DAILY LIVING (ADL)
SWALLOWING: 0-->SWALLOWS FOODS/LIQUIDS WITHOUT DIFFICULTY
TRANSFERRING: 0-->INDEPENDENT
RETIRED_EATING: 0-->INDEPENDENT
TOILETING: 0-->INDEPENDENT
RETIRED_COMMUNICATION: 0-->UNDERSTANDS/COMMUNICATES WITHOUT DIFFICULTY
FALL_HISTORY_WITHIN_LAST_SIX_MONTHS: NO
DRESS: 0-->INDEPENDENT
AMBULATION: 0-->INDEPENDENT
BATHING: 0-->INDEPENDENT
COGNITION: 0 - NO COGNITION ISSUES REPORTED

## 2017-12-21 ASSESSMENT — ENCOUNTER SYMPTOMS
FEVER: 0
VOMITING: 1
DIARRHEA: 1
ABDOMINAL PAIN: 1
NAUSEA: 1
CHEST TIGHTNESS: 1

## 2017-12-21 NOTE — ED PROVIDER NOTES
History     Chief Complaint:  Abdominal pain    HPI   Contreras Pearson is a 52 year old male who presents with abdominal pain. The patient states that he has been experiencing multiple symptoms for the last few weeks including abdominal pain, nausea, diarrhea, and heart burn. He was then seen here in the ED 2 days ago for jaundice when he had a gallstone found via CT, ultrasound, and x ray. He was then at home this afternoon when shortly after eating a meal he had sudden onset of severe upper right quadrant abdominal pain with an episode of vomiting as well. He then called his wife and went to clinic where he was recommended to come here to the ED for further evaluation. While here he endorses the same symptoms he has been experiencing recently, though now much more severe. He denies any recent fevers however. He states that he has used the lidocaine he was prescribed for breakthrough pain with little relief.    Results from 12/19/17:  XR Chest Port 1 view  IMPRESSION:  1. No cause of acute pain identified in the abdomen or pelvis.  2. No evidence of biliary obstruction.  3. Tiny gallstone in the gallbladder.  Report per radiology.      Abdomen U, limited:  IMPRESSION:  1. Small gallstone in the gallbladder. No evidence of acute cholecystitis.  2. No biliary dilatation.  Report per radiology.     Abdomen/Pelvis CT with IV contrast:  IMPRESSION:  1. No cause of acute pain identified in the abdomen or pelvis.  2. No evidence of biliary obstruction.  3. Tiny gallstone in the gallbladder.  Report per radiology.     Allergies:  Penicillins  Sulfa drugs     Medications:    Sucralfate  Pantoprazole     Past Medical History:    The patient denies any relevant past medical history.    Past Surgical History:    History reviewed. No pertinent past surgical history.    Family History:    The patient denies any relevant family medical history.    Social History:  The patient was accompanied to the ED by his wife.  Smoking  Status: Yes  Smokeless Tobacco: No  Alcohol Use: No  Marital Status:       Review of Systems   Constitutional: Negative for fever.   Respiratory: Positive for chest tightness.    Gastrointestinal: Positive for abdominal pain, diarrhea, nausea and vomiting.   All other systems reviewed and are negative.    Physical Exam   Vitals:  Patient Vitals for the past 24 hrs:   BP Temp Temp src Pulse Heart Rate Resp SpO2 Weight   12/21/17 2130 - - - - - 18 - -   12/21/17 2056 - - - - - 20 - -   12/21/17 2054 163/80 - - - - - - -   12/21/17 2050 - - - - - 20 - -   12/21/17 2048 (!) 160/95 - - - - - - -   12/21/17 2044 (!) 156/94 95.4  F (35.2  C) Oral 84 - 16 90 % -   12/21/17 2015 (!) 171/97 - - - - - - -   12/21/17 2000 (!) 172/104 - - - - - - -   12/21/17 1945 (!) 180/110 - - - - - - -   12/21/17 1930 (!) 177/100 - - - - - - -   12/21/17 1915 (!) 216/185 - - - - - - -   12/21/17 1900 177/90 - - - - - - -   12/21/17 1845 (!) 175/95 - - - - - 92 % -   12/21/17 1830 (!) 161/93 - - - - - 95 % -   12/21/17 1815 (!) 172/96 - - - - - 96 % -   12/21/17 1800 (!) 163/97 - - - - - 97 % -   12/21/17 1745 157/89 - - - - - 99 % -   12/21/17 1715 155/90 - - - 84 27 98 % -   12/21/17 1700 (!) 182/101 97.8  F (36.6  C) Oral - 92 16 96 % -   12/21/17 1658 - - - 91 91 18 96 % 103.4 kg (228 lb)     Physical Exam   Constitutional: He appears well-developed and well-nourished. He appears distressed.   HENT:   Right Ear: External ear normal.   Left Ear: External ear normal.   Mouth/Throat: Oropharynx is clear and moist. No oropharyngeal exudate.   TM's clear bilaterally   Eyes: Conjunctivae are normal. Pupils are equal, round, and reactive to light. No scleral icterus.   Neck: Normal range of motion. Neck supple.   Cardiovascular: Normal rate, regular rhythm, normal heart sounds and intact distal pulses.  Exam reveals no gallop and no friction rub.    No murmur heard.  Pulmonary/Chest: Effort normal and breath sounds normal. No respiratory  distress. He has no wheezes. He has no rales.   Abdominal: Soft. Bowel sounds are normal. He exhibits no distension and no mass. There is tenderness (Right upper quadrant and epigastric TTP). There is rebound and guarding.   Musculoskeletal: Normal range of motion. He exhibits no edema.   Neurological: He is alert.   Skin: Skin is warm and dry. No rash noted.   Psychiatric:   Anxious, crying     Emergency Department Course     ECG:  ECG taken at 1656, ECG read at 1567  Normal sinus rhythm  Possible left atrial enlargement  Nonspecific ST abnormality  Abnormal ECG  Rate 92 bpm. AZ interval 130. QRS duration 94. QT/QTc 366/452. P-R-T axes 40 28 56.    Imaging:  Radiology findings were communicated with the patient who voiced understanding of the findings.  US Abdomen Complete:  1. Small gallstone seen one day ago by CT is either not sonographically visible or may have passed out of the gallbladder.  2. Common bile duct dilatation.    Per Radiologist.     Laboratory:  Laboratory findings were communicated with the patient who voiced understanding of the findings.  CBC: WBC: 24.5 (H) o/w WNL. (HGB 16.5, )   INR: 0.89  CMP: Potassium: 3.3 (L), Glucose: 232 (H), Bilirubin total: 3.4 (H), ALKPHOS: 497 (H), ALT: 589 (HH), AST: 357 (H) o/w WNL (Creatinine 0.90)  Lipase: 80302 (H)  UA: Glucose: 50 (A), Mucous: Present (A)  Lactic acid (collected at 1826): 1.4  INR: 0.89    Interventions:  1708 Normal Saline 1000 mL IV  1708 Zofran, 4 mg, IV  1740 Morphine, 4 mg, IV  1825 Normal Saline 1000 mL IV  1826 Morphine, 4 mg, IV  1838 InVanz, 1 g , IV  1953 Dilaudid, 1 mg, IV  2056 Pepcid, 20 mg, IV  2129 Compazine, 10 mg, PO  2129 Compazine, 25 mg, Rectal  2056 Dilaudid, 0.5 mg, IV     Emergency Department Course:  Nursing notes and vitals reviewed.  1654 I had my initial encounter with the patient.  I performed an exam of the patient as documented above.   EKG obtained in the ED, see results above.   IV was inserted and blood  was drawn for laboratory testing, results above.  The patient provided a urine sample here in the emergency department. This was sent for laboratory testing, findings above.  The patient was sent for a US while in the emergency department, results above.   I spoke with Dr. Hernandez of [facility] regarding this patient.  I spoke with Dr. Bangura regarding this patient.    I discussed the treatment plan with the patient. They expressed understanding of this plan and consented to admission. I discussed the patient with Dr Bangura, who will admit the patient to a monitored bed for further evaluation and treatment.    Impression & Plan      Medical Decision Making:  Contreras Pearson is a 52 year old male who presents to the emergency department today with severe right upper quadrant abdominal pain with onset around 3:30 this afternoon. The patient was seen two days ago by Dr. Le for jaundice and vague abdominal discomfort. At that time he did have elevated LFTs, mild bilirubin elevation, and a small gallstone. We did a repeat ultrasound. His labs are much worse, including increased elevation of bilirubin. He also has a white count of 24.5. He has been afebrile here as well as at home. His lipase is extremely elevated suggesting that this is a biliary pancreatitis. The gallstone that was seen is no longer seen at this point on ultrasound, but there is also common bile duct dilation to suggest choledocholithiasis. I discussed the finding with the GI physician on call and he suggest ERCP, however that is not done tomorrow at [facility]. He did recommend transfer to [facility], however there are no beds so we will keep the patient here and transfer him the ERCP tomorrow morning. The patient was given Zosyn here. He will be admitted by Dr. Bangura. The patient's pain is better controled with Dilaudid at this point. He is kept NPO and is made aware of his diagnosis and agrees with the plan.    Diagnosis:    ICD-10-CM    1. Acute  biliary pancreatitis, unspecified complication status K85.10 UA with Microscopic     Lactic acid   2. Choledocholithiasis K80.50      Disposition:   Admission    Scribe Disclosure:  I, Rocael Rosa, am serving as a scribe at 4:55 PM on 12/21/2017 to document services personally performed by Raz Aguilar MD, based on my observations and the provider's statements to me.    12/21/2017   [facility] EMERGENCY DEPARTMENT       Raz Aguilar MD  12/21/17 1930

## 2017-12-21 NOTE — ED NOTES
Pt diagnosed with gallstones about two days ago. This afternoon began experiencing severe abdominal pain in upper right quadrant right after eating. Pt diaphoretic. Alert and oriented, ABCs intact.

## 2017-12-22 ENCOUNTER — HOSPITAL ENCOUNTER (OUTPATIENT)
Facility: CLINIC | Age: 52
Discharge: OTHER INSTITUTION | End: 2017-12-22
Attending: INTERNAL MEDICINE | Admitting: INTERNAL MEDICINE
Payer: COMMERCIAL

## 2017-12-22 ENCOUNTER — ANESTHESIA EVENT (OUTPATIENT)
Dept: SURGERY | Facility: CLINIC | Age: 52
End: 2017-12-22
Payer: COMMERCIAL

## 2017-12-22 ENCOUNTER — HOSPITAL ENCOUNTER (INPATIENT)
Facility: CLINIC | Age: 52
LOS: 9 days | Discharge: HOME | End: 2017-12-31
Attending: INTERNAL MEDICINE | Admitting: INTERNAL MEDICINE
Payer: COMMERCIAL

## 2017-12-22 ENCOUNTER — ANESTHESIA (OUTPATIENT)
Dept: SURGERY | Facility: CLINIC | Age: 52
End: 2017-12-22
Payer: COMMERCIAL

## 2017-12-22 VITALS
DIASTOLIC BLOOD PRESSURE: 93 MMHG | TEMPERATURE: 99.2 F | OXYGEN SATURATION: 96 % | SYSTOLIC BLOOD PRESSURE: 156 MMHG | RESPIRATION RATE: 30 BRPM

## 2017-12-22 VITALS
SYSTOLIC BLOOD PRESSURE: 167 MMHG | HEART RATE: 90 BPM | WEIGHT: 229.2 LBS | OXYGEN SATURATION: 93 % | RESPIRATION RATE: 16 BRPM | DIASTOLIC BLOOD PRESSURE: 98 MMHG | TEMPERATURE: 95.7 F

## 2017-12-22 DIAGNOSIS — K85.10 GALLSTONE PANCREATITIS: Primary | ICD-10-CM

## 2017-12-22 LAB
ALBUMIN SERPL-MCNC: 3.3 G/DL (ref 3.4–5)
ALP SERPL-CCNC: 395 U/L (ref 40–150)
ALT SERPL W P-5'-P-CCNC: 464 U/L (ref 0–70)
ANION GAP SERPL CALCULATED.3IONS-SCNC: 6 MMOL/L (ref 3–14)
AST SERPL W P-5'-P-CCNC: 251 U/L (ref 0–45)
BASOPHILS # BLD AUTO: 0 10E9/L (ref 0–0.2)
BASOPHILS NFR BLD AUTO: 0.2 %
BILIRUB SERPL-MCNC: 2.5 MG/DL (ref 0.2–1.3)
BUN SERPL-MCNC: 13 MG/DL (ref 7–30)
CALCIUM SERPL-MCNC: 8.6 MG/DL (ref 8.5–10.1)
CHLORIDE SERPL-SCNC: 109 MMOL/L (ref 94–109)
CO2 SERPL-SCNC: 26 MMOL/L (ref 20–32)
CREAT SERPL-MCNC: 0.8 MG/DL (ref 0.66–1.25)
DIFFERENTIAL METHOD BLD: ABNORMAL
EOSINOPHIL # BLD AUTO: 0 10E9/L (ref 0–0.7)
EOSINOPHIL NFR BLD AUTO: 0 %
ERYTHROCYTE [DISTWIDTH] IN BLOOD BY AUTOMATED COUNT: 15.7 % (ref 10–15)
GFR SERPL CREATININE-BSD FRML MDRD: >90 ML/MIN/1.7M2
GLUCOSE SERPL-MCNC: 166 MG/DL (ref 70–99)
HCT VFR BLD AUTO: 43.5 % (ref 40–53)
HGB BLD-MCNC: 14.7 G/DL (ref 13.3–17.7)
IMM GRANULOCYTES # BLD: 0.1 10E9/L (ref 0–0.4)
IMM GRANULOCYTES NFR BLD: 0.3 %
INTERPRETATION ECG - MUSE: NORMAL
LACTATE BLD-SCNC: 1.3 MMOL/L (ref 0.7–2)
LIPASE SERPL-CCNC: 7066 U/L (ref 73–393)
LYMPHOCYTES # BLD AUTO: 0.8 10E9/L (ref 0.8–5.3)
LYMPHOCYTES NFR BLD AUTO: 4.7 %
MCH RBC QN AUTO: 29.6 PG (ref 26.5–33)
MCHC RBC AUTO-ENTMCNC: 33.8 G/DL (ref 31.5–36.5)
MCV RBC AUTO: 88 FL (ref 78–100)
MONOCYTES # BLD AUTO: 0.8 10E9/L (ref 0–1.3)
MONOCYTES NFR BLD AUTO: 4.4 %
NEUTROPHILS # BLD AUTO: 15.5 10E9/L (ref 1.6–8.3)
NEUTROPHILS NFR BLD AUTO: 90.4 %
NRBC # BLD AUTO: 0 10*3/UL
NRBC BLD AUTO-RTO: 0 /100
PLATELET # BLD AUTO: 303 10E9/L (ref 150–450)
POTASSIUM SERPL-SCNC: 4.3 MMOL/L (ref 3.4–5.3)
PROT SERPL-MCNC: 6.6 G/DL (ref 6.8–8.8)
RBC # BLD AUTO: 4.97 10E12/L (ref 4.4–5.9)
SODIUM SERPL-SCNC: 141 MMOL/L (ref 133–144)
UPPER EUS: NORMAL
WBC # BLD AUTO: 17.2 10E9/L (ref 4–11)

## 2017-12-22 PROCEDURE — 25000128 H RX IP 250 OP 636: Performed by: INTERNAL MEDICINE

## 2017-12-22 PROCEDURE — 25000125 ZZHC RX 250: Performed by: INTERNAL MEDICINE

## 2017-12-22 PROCEDURE — 71000013 ZZH RECOVERY PHASE 1 LEVEL 1 EA ADDTL HR: Performed by: INTERNAL MEDICINE

## 2017-12-22 PROCEDURE — 80053 COMPREHEN METABOLIC PANEL: CPT | Performed by: INTERNAL MEDICINE

## 2017-12-22 PROCEDURE — 36000125 ZZH INTERCOMPANY SERVICE, OPERATIVE 360 OPNP

## 2017-12-22 PROCEDURE — 40000169 ZZH STATISTIC PRE-PROCEDURE ASSESSMENT I: Performed by: INTERNAL MEDICINE

## 2017-12-22 PROCEDURE — 36000058 ZZH SURGERY LEVEL 3 EA 15 ADDTL MIN: Performed by: INTERNAL MEDICINE

## 2017-12-22 PROCEDURE — 25000125 ZZHC RX 250: Performed by: NURSE ANESTHETIST, CERTIFIED REGISTERED

## 2017-12-22 PROCEDURE — 36000056 ZZH SURGERY LEVEL 3 1ST 30 MIN: Performed by: INTERNAL MEDICINE

## 2017-12-22 PROCEDURE — 92000033 ZZH INTERCOMPANY SERVICE, OTHER 920 OPNP

## 2017-12-22 PROCEDURE — 25000109 ZZH INTERCOMPANY SERVICE, DRUGS 250 OPNP

## 2017-12-22 PROCEDURE — 37000009 ZZH ANESTHESIA TECHNICAL FEE, EACH ADDTL 15 MIN: Performed by: INTERNAL MEDICINE

## 2017-12-22 PROCEDURE — 71000012 ZZH RECOVERY PHASE 1 LEVEL 1 FIRST HR: Performed by: INTERNAL MEDICINE

## 2017-12-22 PROCEDURE — 36415 COLL VENOUS BLD VENIPUNCTURE: CPT | Performed by: INTERNAL MEDICINE

## 2017-12-22 PROCEDURE — 25000128 H RX IP 250 OP 636: Performed by: NURSE ANESTHETIST, CERTIFIED REGISTERED

## 2017-12-22 PROCEDURE — 25000566 ZZH SEVOFLURANE, EA 15 MIN: Performed by: INTERNAL MEDICINE

## 2017-12-22 PROCEDURE — 85025 COMPLETE CBC W/AUTO DIFF WBC: CPT | Performed by: INTERNAL MEDICINE

## 2017-12-22 PROCEDURE — 25000128 H RX IP 250 OP 636: Performed by: SURGERY

## 2017-12-22 PROCEDURE — 36000022 ZZH INTERCOMPANY SERVICE, AMB SURG 360 OPNP

## 2017-12-22 PROCEDURE — 37000008 ZZH ANESTHESIA TECHNICAL FEE, 1ST 30 MIN: Performed by: INTERNAL MEDICINE

## 2017-12-22 PROCEDURE — 40000067 ZZH STATISTIC ERCP (OR PROCEDURE): Performed by: INTERNAL MEDICINE

## 2017-12-22 PROCEDURE — 83605 ASSAY OF LACTIC ACID: CPT | Performed by: INTERNAL MEDICINE

## 2017-12-22 PROCEDURE — 83690 ASSAY OF LIPASE: CPT | Performed by: INTERNAL MEDICINE

## 2017-12-22 RX ORDER — POTASSIUM CHLORIDE 1.5 G/1.58G
20-40 POWDER, FOR SOLUTION ORAL
Status: DISCONTINUED | OUTPATIENT
Start: 2017-12-22 | End: 2017-12-31 | Stop reason: HOSPADM

## 2017-12-22 RX ORDER — FENTANYL CITRATE 50 UG/ML
INJECTION, SOLUTION INTRAMUSCULAR; INTRAVENOUS PRN
Status: DISCONTINUED | OUTPATIENT
Start: 2017-12-22 | End: 2017-12-22

## 2017-12-22 RX ORDER — MEPERIDINE HYDROCHLORIDE 25 MG/ML
12.5 INJECTION INTRAMUSCULAR; INTRAVENOUS; SUBCUTANEOUS
Status: DISCONTINUED | OUTPATIENT
Start: 2017-12-22 | End: 2017-12-22 | Stop reason: HOSPADM

## 2017-12-22 RX ORDER — FENTANYL CITRATE 50 UG/ML
50 INJECTION, SOLUTION INTRAMUSCULAR; INTRAVENOUS ONCE
Status: COMPLETED | OUTPATIENT
Start: 2017-12-22 | End: 2017-12-22

## 2017-12-22 RX ORDER — LIDOCAINE HYDROCHLORIDE 20 MG/ML
INJECTION, SOLUTION INFILTRATION; PERINEURAL PRN
Status: DISCONTINUED | OUTPATIENT
Start: 2017-12-22 | End: 2017-12-22

## 2017-12-22 RX ORDER — ONDANSETRON 2 MG/ML
4 INJECTION INTRAMUSCULAR; INTRAVENOUS EVERY 6 HOURS PRN
Status: DISCONTINUED | OUTPATIENT
Start: 2017-12-22 | End: 2017-12-31 | Stop reason: HOSPADM

## 2017-12-22 RX ORDER — POTASSIUM CHLORIDE 1500 MG/1
20-40 TABLET, EXTENDED RELEASE ORAL
Status: CANCELLED | OUTPATIENT
Start: 2017-12-22

## 2017-12-22 RX ORDER — OXYCODONE HYDROCHLORIDE 5 MG/1
5-10 TABLET ORAL
Status: DISCONTINUED | OUTPATIENT
Start: 2017-12-22 | End: 2017-12-31 | Stop reason: HOSPADM

## 2017-12-22 RX ORDER — POTASSIUM CHLORIDE 1.5 G/1.58G
20-40 POWDER, FOR SOLUTION ORAL
Status: CANCELLED | OUTPATIENT
Start: 2017-12-22

## 2017-12-22 RX ORDER — PROCHLORPERAZINE MALEATE 5 MG
10 TABLET ORAL EVERY 6 HOURS PRN
Status: CANCELLED | OUTPATIENT
Start: 2017-12-22

## 2017-12-22 RX ORDER — POTASSIUM CHLORIDE 29.8 MG/ML
20 INJECTION INTRAVENOUS
Status: DISCONTINUED | OUTPATIENT
Start: 2017-12-22 | End: 2017-12-22

## 2017-12-22 RX ORDER — PROPOFOL 10 MG/ML
INJECTION, EMULSION INTRAVENOUS CONTINUOUS PRN
Status: DISCONTINUED | OUTPATIENT
Start: 2017-12-22 | End: 2017-12-22

## 2017-12-22 RX ORDER — ONDANSETRON 2 MG/ML
4 INJECTION INTRAMUSCULAR; INTRAVENOUS EVERY 6 HOURS PRN
Status: CANCELLED | OUTPATIENT
Start: 2017-12-22

## 2017-12-22 RX ORDER — HYDROMORPHONE HYDROCHLORIDE 1 MG/ML
.3-.5 INJECTION, SOLUTION INTRAMUSCULAR; INTRAVENOUS; SUBCUTANEOUS
Status: DISCONTINUED | OUTPATIENT
Start: 2017-12-22 | End: 2017-12-23 | Stop reason: ALTCHOICE

## 2017-12-22 RX ORDER — PROPOFOL 10 MG/ML
INJECTION, EMULSION INTRAVENOUS PRN
Status: DISCONTINUED | OUTPATIENT
Start: 2017-12-22 | End: 2017-12-22

## 2017-12-22 RX ORDER — OXYCODONE HYDROCHLORIDE 5 MG/1
5-10 TABLET ORAL
Status: CANCELLED | OUTPATIENT
Start: 2017-12-22

## 2017-12-22 RX ORDER — SODIUM CHLORIDE 9 MG/ML
INJECTION, SOLUTION INTRAVENOUS CONTINUOUS
Status: CANCELLED | OUTPATIENT
Start: 2017-12-22

## 2017-12-22 RX ORDER — FLUMAZENIL 0.1 MG/ML
0.2 INJECTION, SOLUTION INTRAVENOUS
Status: CANCELLED | OUTPATIENT
Start: 2017-12-22 | End: 2017-12-23

## 2017-12-22 RX ORDER — POTASSIUM CHLORIDE 7.45 MG/ML
10 INJECTION INTRAVENOUS
Status: CANCELLED | OUTPATIENT
Start: 2017-12-22

## 2017-12-22 RX ORDER — HYDROMORPHONE HYDROCHLORIDE 1 MG/ML
.3-.5 INJECTION, SOLUTION INTRAMUSCULAR; INTRAVENOUS; SUBCUTANEOUS
Status: CANCELLED | OUTPATIENT
Start: 2017-12-22

## 2017-12-22 RX ORDER — ONDANSETRON 2 MG/ML
4 INJECTION INTRAMUSCULAR; INTRAVENOUS EVERY 30 MIN PRN
Status: DISCONTINUED | OUTPATIENT
Start: 2017-12-22 | End: 2017-12-22 | Stop reason: HOSPADM

## 2017-12-22 RX ORDER — NALOXONE HYDROCHLORIDE 0.4 MG/ML
.1-.4 INJECTION, SOLUTION INTRAMUSCULAR; INTRAVENOUS; SUBCUTANEOUS
Status: CANCELLED | OUTPATIENT
Start: 2017-12-22 | End: 2017-12-23

## 2017-12-22 RX ORDER — NALOXONE HYDROCHLORIDE 0.4 MG/ML
.1-.4 INJECTION, SOLUTION INTRAMUSCULAR; INTRAVENOUS; SUBCUTANEOUS
Status: CANCELLED | OUTPATIENT
Start: 2017-12-22

## 2017-12-22 RX ORDER — ONDANSETRON 4 MG/1
4 TABLET, ORALLY DISINTEGRATING ORAL EVERY 30 MIN PRN
Status: DISCONTINUED | OUTPATIENT
Start: 2017-12-22 | End: 2017-12-22 | Stop reason: HOSPADM

## 2017-12-22 RX ORDER — NALOXONE HYDROCHLORIDE 0.4 MG/ML
.1-.4 INJECTION, SOLUTION INTRAMUSCULAR; INTRAVENOUS; SUBCUTANEOUS
Status: DISCONTINUED | OUTPATIENT
Start: 2017-12-22 | End: 2017-12-22 | Stop reason: HOSPADM

## 2017-12-22 RX ORDER — FENTANYL CITRATE 50 UG/ML
25-50 INJECTION, SOLUTION INTRAMUSCULAR; INTRAVENOUS
Status: DISCONTINUED | OUTPATIENT
Start: 2017-12-22 | End: 2017-12-22 | Stop reason: HOSPADM

## 2017-12-22 RX ORDER — POTASSIUM CHLORIDE 29.8 MG/ML
20 INJECTION INTRAVENOUS
Status: CANCELLED | OUTPATIENT
Start: 2017-12-22

## 2017-12-22 RX ORDER — SODIUM CHLORIDE, SODIUM LACTATE, POTASSIUM CHLORIDE, CALCIUM CHLORIDE 600; 310; 30; 20 MG/100ML; MG/100ML; MG/100ML; MG/100ML
INJECTION, SOLUTION INTRAVENOUS CONTINUOUS
Status: DISCONTINUED | OUTPATIENT
Start: 2017-12-22 | End: 2017-12-22 | Stop reason: HOSPADM

## 2017-12-22 RX ORDER — NICOTINE 21 MG/24HR
1 PATCH, TRANSDERMAL 24 HOURS TRANSDERMAL DAILY
Status: CANCELLED | OUTPATIENT
Start: 2017-12-22

## 2017-12-22 RX ORDER — LIDOCAINE 40 MG/G
CREAM TOPICAL
Status: DISCONTINUED | OUTPATIENT
Start: 2017-12-22 | End: 2017-12-22 | Stop reason: HOSPADM

## 2017-12-22 RX ORDER — ONDANSETRON 2 MG/ML
INJECTION INTRAMUSCULAR; INTRAVENOUS PRN
Status: DISCONTINUED | OUTPATIENT
Start: 2017-12-22 | End: 2017-12-22

## 2017-12-22 RX ORDER — PROCHLORPERAZINE MALEATE 5 MG
10 TABLET ORAL EVERY 6 HOURS PRN
Status: DISCONTINUED | OUTPATIENT
Start: 2017-12-22 | End: 2017-12-31 | Stop reason: HOSPADM

## 2017-12-22 RX ORDER — ONDANSETRON 4 MG/1
4 TABLET, ORALLY DISINTEGRATING ORAL EVERY 6 HOURS PRN
Status: DISCONTINUED | OUTPATIENT
Start: 2017-12-22 | End: 2017-12-31 | Stop reason: HOSPADM

## 2017-12-22 RX ORDER — POTASSIUM CHLORIDE 1500 MG/1
20-40 TABLET, EXTENDED RELEASE ORAL
Status: DISCONTINUED | OUTPATIENT
Start: 2017-12-22 | End: 2017-12-31 | Stop reason: HOSPADM

## 2017-12-22 RX ORDER — NALOXONE HYDROCHLORIDE 0.4 MG/ML
.1-.4 INJECTION, SOLUTION INTRAMUSCULAR; INTRAVENOUS; SUBCUTANEOUS
Status: DISCONTINUED | OUTPATIENT
Start: 2017-12-22 | End: 2017-12-23 | Stop reason: ALTCHOICE

## 2017-12-22 RX ORDER — INDOMETHACIN 50 MG/1
100 SUPPOSITORY RECTAL
Status: DISCONTINUED | OUTPATIENT
Start: 2017-12-22 | End: 2017-12-22 | Stop reason: HOSPADM

## 2017-12-22 RX ORDER — NICOTINE 21 MG/24HR
1 PATCH, TRANSDERMAL 24 HOURS TRANSDERMAL DAILY
Status: DISCONTINUED | OUTPATIENT
Start: 2017-12-22 | End: 2017-12-31 | Stop reason: HOSPADM

## 2017-12-22 RX ORDER — PROCHLORPERAZINE 25 MG
25 SUPPOSITORY, RECTAL RECTAL EVERY 12 HOURS PRN
Status: CANCELLED | OUTPATIENT
Start: 2017-12-22

## 2017-12-22 RX ORDER — POTASSIUM CHLORIDE 7.45 MG/ML
10 INJECTION INTRAVENOUS
Status: DISCONTINUED | OUTPATIENT
Start: 2017-12-22 | End: 2017-12-31 | Stop reason: HOSPADM

## 2017-12-22 RX ORDER — POTASSIUM CL/LIDO/0.9 % NACL 10MEQ/0.1L
10 INTRAVENOUS SOLUTION, PIGGYBACK (ML) INTRAVENOUS
Status: CANCELLED | OUTPATIENT
Start: 2017-12-22

## 2017-12-22 RX ORDER — ONDANSETRON 4 MG/1
4 TABLET, ORALLY DISINTEGRATING ORAL EVERY 6 HOURS PRN
Status: CANCELLED | OUTPATIENT
Start: 2017-12-22

## 2017-12-22 RX ORDER — POTASSIUM CL/LIDO/0.9 % NACL 10MEQ/0.1L
10 INTRAVENOUS SOLUTION, PIGGYBACK (ML) INTRAVENOUS
Status: DISCONTINUED | OUTPATIENT
Start: 2017-12-22 | End: 2017-12-31 | Stop reason: HOSPADM

## 2017-12-22 RX ORDER — SODIUM CHLORIDE 9 MG/ML
INJECTION, SOLUTION INTRAVENOUS CONTINUOUS
Status: DISCONTINUED | OUTPATIENT
Start: 2017-12-22 | End: 2017-12-23 | Stop reason: ALTCHOICE

## 2017-12-22 RX ORDER — PROCHLORPERAZINE 25 MG
25 SUPPOSITORY, RECTAL RECTAL EVERY 12 HOURS PRN
Status: DISCONTINUED | OUTPATIENT
Start: 2017-12-22 | End: 2017-12-31 | Stop reason: HOSPADM

## 2017-12-22 RX ADMIN — Medication 10 MEQ: at 00:11

## 2017-12-22 RX ADMIN — DEXMEDETOMIDINE HYDROCHLORIDE 0.4 MCG/KG/HR: 4 INJECTION, SOLUTION INTRAVENOUS at 15:00

## 2017-12-22 RX ADMIN — NICOTINE 1 PATCH: 21 PATCH, EXTENDED RELEASE TRANSDERMAL at 21:50

## 2017-12-22 RX ADMIN — SODIUM CHLORIDE, POTASSIUM CHLORIDE, SODIUM LACTATE AND CALCIUM CHLORIDE: 600; 310; 30; 20 INJECTION, SOLUTION INTRAVENOUS at 13:15

## 2017-12-22 RX ADMIN — HYDROMORPHONE HYDROCHLORIDE 0.5 MG: 1 INJECTION, SOLUTION INTRAMUSCULAR; INTRAVENOUS; SUBCUTANEOUS at 11:09

## 2017-12-22 RX ADMIN — SODIUM CHLORIDE: 9 INJECTION, SOLUTION INTRAVENOUS at 11:09

## 2017-12-22 RX ADMIN — SUCCINYLCHOLINE CHLORIDE 100 MG: 20 INJECTION, SOLUTION INTRAMUSCULAR; INTRAVENOUS at 15:20

## 2017-12-22 RX ADMIN — HYDROMORPHONE HYDROCHLORIDE 0.5 MG: 1 INJECTION, SOLUTION INTRAMUSCULAR; INTRAVENOUS; SUBCUTANEOUS at 04:17

## 2017-12-22 RX ADMIN — FAMOTIDINE 20 MG: 10 INJECTION, SOLUTION INTRAVENOUS at 08:08

## 2017-12-22 RX ADMIN — MIDAZOLAM 2 MG: 1 INJECTION INTRAMUSCULAR; INTRAVENOUS at 14:52

## 2017-12-22 RX ADMIN — LIDOCAINE HYDROCHLORIDE 1 ML: 10 INJECTION, SOLUTION EPIDURAL; INFILTRATION; INTRACAUDAL; PERINEURAL at 13:03

## 2017-12-22 RX ADMIN — FENTANYL CITRATE 50 MCG: 50 INJECTION, SOLUTION INTRAMUSCULAR; INTRAVENOUS at 14:52

## 2017-12-22 RX ADMIN — SODIUM CHLORIDE: 9 INJECTION, SOLUTION INTRAVENOUS at 05:35

## 2017-12-22 RX ADMIN — HYDROMORPHONE HYDROCHLORIDE 0.5 MG: 1 INJECTION, SOLUTION INTRAMUSCULAR; INTRAVENOUS; SUBCUTANEOUS at 00:38

## 2017-12-22 RX ADMIN — HYDROMORPHONE HYDROCHLORIDE 0.5 MG: 1 INJECTION, SOLUTION INTRAMUSCULAR; INTRAVENOUS; SUBCUTANEOUS at 18:57

## 2017-12-22 RX ADMIN — WATER 1 G: 1 INJECTION INTRAMUSCULAR; INTRAVENOUS; SUBCUTANEOUS at 19:56

## 2017-12-22 RX ADMIN — HYDROMORPHONE HYDROCHLORIDE 0.5 MG: 1 INJECTION, SOLUTION INTRAMUSCULAR; INTRAVENOUS; SUBCUTANEOUS at 09:19

## 2017-12-22 RX ADMIN — LIDOCAINE HYDROCHLORIDE 60 MG: 20 INJECTION, SOLUTION INFILTRATION; PERINEURAL at 15:00

## 2017-12-22 RX ADMIN — OXYCODONE HYDROCHLORIDE 10 MG: 5 TABLET ORAL at 21:50

## 2017-12-22 RX ADMIN — MIDAZOLAM 2 MG: 1 INJECTION INTRAMUSCULAR; INTRAVENOUS at 14:56

## 2017-12-22 RX ADMIN — FENTANYL CITRATE 50 MCG: 50 INJECTION INTRAMUSCULAR; INTRAVENOUS at 17:40

## 2017-12-22 RX ADMIN — HYDROMORPHONE HYDROCHLORIDE 0.5 MG: 1 INJECTION, SOLUTION INTRAMUSCULAR; INTRAVENOUS; SUBCUTANEOUS at 07:20

## 2017-12-22 RX ADMIN — HYDROMORPHONE HYDROCHLORIDE 0.5 MG: 1 INJECTION, SOLUTION INTRAMUSCULAR; INTRAVENOUS; SUBCUTANEOUS at 02:25

## 2017-12-22 RX ADMIN — SODIUM CHLORIDE: 9 INJECTION, SOLUTION INTRAVENOUS at 19:32

## 2017-12-22 RX ADMIN — FENTANYL CITRATE 50 MCG: 50 INJECTION, SOLUTION INTRAMUSCULAR; INTRAVENOUS at 13:03

## 2017-12-22 RX ADMIN — Medication 10 MEQ: at 01:10

## 2017-12-22 RX ADMIN — ONDANSETRON 4 MG: 2 INJECTION INTRAMUSCULAR; INTRAVENOUS at 15:31

## 2017-12-22 RX ADMIN — ONDANSETRON 4 MG: 2 INJECTION INTRAMUSCULAR; INTRAVENOUS at 05:29

## 2017-12-22 RX ADMIN — PROPOFOL 200 MG: 10 INJECTION, EMULSION INTRAVENOUS at 15:20

## 2017-12-22 RX ADMIN — HYDROMORPHONE HYDROCHLORIDE 0.5 MG: 1 INJECTION, SOLUTION INTRAMUSCULAR; INTRAVENOUS; SUBCUTANEOUS at 05:43

## 2017-12-22 RX ADMIN — PROPOFOL 125 MCG/KG/MIN: 10 INJECTION, EMULSION INTRAVENOUS at 15:00

## 2017-12-22 ASSESSMENT — PAIN DESCRIPTION - DESCRIPTORS
DESCRIPTORS: CRAMPING

## 2017-12-22 ASSESSMENT — LIFESTYLE VARIABLES: TOBACCO_USE: 1

## 2017-12-22 NOTE — IP AVS SNAPSHOT
[place] [address] [facility]    [address]    [facility] [address]    Phone:  [phone]    Fax:  [phone]                                       After Visit Summary   12/22/2017    Contreras Pearson    MRN: [id number]           After Visit Summary Signature Page     I have received my discharge instructions, and my questions have been answered. I have discussed any challenges I see with this plan with the nurse or doctor.    ..........................................................................................................................................  Patient/Patient Representative Signature      ..........................................................................................................................................  Patient Representative Print Name and Relationship to Patient    ..................................................               ................................................  Date                                            Time    ..........................................................................................................................................  Reviewed by Signature/Title    ...................................................              ..............................................  Date                                                            Time

## 2017-12-22 NOTE — CONSULTS
GASTROENTEROLOGY CONSULTATION      Contreras Pearson  [address] [facility] [address]  52 year old male     Admission Date/Time: 12/21/2017  Primary Care Provider: Radha Sánchez  Referring / Attending Physician:  Dr. Bangura     We were asked to see the patient in consultation by Dr. Bangura for evaluation of abdominal pain.        HPI:  Contreras Pearson is a 52 year old male with history of cigarette smoking who presents to emergency room with abdominal pain and nausea.    Patient has been experiencing intermittent epigastric and right upper quadrant pain for a number of years.  She is to the last 2 months the pain has become more persistent and occurs after eating.  He sought medical attention at his primary care clinic.  LFTs were elevated and there are plans for a CT scan.  A CT scan completed on December 20 showed a normal-appearing gallbladder, a tiny gallstone in the gallbladder, but no evidence of biliary obstruction.  His pain persisted so he came to the emergency room.  LFTs had continued to rise with an elevation in his lipase.  ALT was 589, , alkaline phosphatase 497, total bilirubin 3.4.  Lipase was 24,000.  An ultrasound was repeated.  This did show some mild CBD dilation.  The gallstone was no longer seen in the gallbladder.    The patient denies any previous abdominal surgeries.  He will rarely drink alcohol.  He smokes 1 pack of cigarettes daily.  He denies any melena or hematochezia.  He is unaware of any family history of gallbladder or pancreas problems. The he denies any significant findings patient did undergo a screening colonoscopy last year at age 51.  No fever or chills, no further vomiting.  He denies any history of IV drug use.  No previous knowledge of liver disease or hepatitis.       PAST MEDICAL HISTORY:  Patient Active Problem List    Diagnosis Date Noted     Choledocholithiasis 12/21/2017     Priority: Medium          ROS: A comprehensive ten point review of  systems was negative aside from those in mentioned in the HPI.       MEDICATIONS:   Prior to Admission medications    Medication Sig Start Date End Date Taking? Authorizing Provider   SUCRALFATE PO Take 1 g by mouth 4 times daily    Yes Reported, Patient   PANTOPRAZOLE SODIUM PO Take 40 mg by mouth every morning (before breakfast)    Yes Reported, Patient        ALLERGIES:   Allergies   Allergen Reactions     Penicillins      Sulfa Drugs         SOCIAL HISTORY:  Social History   Substance Use Topics     Smoking status: Current Every Day Smoker     Packs/day: 1.00     Smokeless tobacco: Never Used     Alcohol use Yes      Comment: occasionally        FAMILY HISTORY: Noncontributory        PHYSICAL EXAM:     BP (!) 167/98 (BP Location: Left arm)  Pulse 90  Temp 95.7  F (35.4  C) (Oral)  Resp 16  Wt 104 kg (229 lb 3.2 oz)  SpO2 93%     PHYSICAL EXAM:  GENERAL: Uncomfortable  SKIN: no suspicious lesions, rashes  HEAD: Normocephalic. Atraumatic.  NECK: Neck supple. No adenopathy.   RESPIRATORY: Good transmission. CTA bilaterally.   CARDIOVASCULAR: RRR, normal S1, S2,  No murmur appreciated  GASTROINTESTINAL: +BS, soft, epigastric and right upper quadrant tenderness, non distended, no guarding/rebound  JOINT/EXTREMITIES:  no gross deformities noted, normal muscle tone  NEURO: CN 2-12 grossly intact, no focal deficits  PSYCH: Normal affect        ADDITIONAL COMMENTS:   I reviewed the patient's new clinical lab test results.   Recent Labs   Lab Test  12/22/17 0409 12/21/17 1701 12/19/17   2300   WBC  17.2*  24.5*  5.8   HGB  14.7  16.5  14.5   MCV  88  87  88   PLT  303  375  261   INR   --   0.89  0.89     Recent Labs   Lab Test  12/22/17   0409 12/21/17   1701  12/19/17   2300   POTASSIUM  4.3  3.3*  3.9   CHLORIDE  109  106  108   CO2  26  24  28   BUN  13  11  10   ANIONGAP  6  11  5     Recent Labs   Lab Test  12/22/17   0409 12/21/17   1940  12/21/17   1701  12/20/17   0018  12/19/17   2300   ALBUMIN   3.3*   --   3.9   --   3.3*   BILITOTAL  2.5*   --   3.4*   --   2.0*   ALT  464*   --   589*   --   453*   AST  251*   --   357*   --   283*   PROTEIN   --   Negative   --   Negative   --    LIPASE  7066*   --   55988*   --   187        IMAGING / ENDOSCOPY    COMPLETE ABDOMEN ULTRASOUND  12/21/2017 5:47 PM     FINDINGS: Tiny gallstone present dependently within the gallbladder on  12/20/2017 CT is not sonographically visible. No gallbladder wall  thickening. The common bile duct is mildly dilated measuring 0.8 cm in  diameter. The liver, spleen, kidneys, as well as the visualized  portions of the pancreas, abdominal aorta and IVC appear within normal  limits.    IMPRESSION:    1. Small gallstone seen one day ago by CT is either not  sonographically visible or may have passed out of the gallbladder.  2. Common bile duct dilatation.      CT ABDOMEN AND PELVIS WITH CONTRAST  12/20/2017 3:12 AM   IMPRESSION:  1. No cause of acute pain identified in the abdomen or pelvis.  2. No evidence of biliary obstruction.  3. Tiny gallstone in the gallbladder.      CONSULTATION ASSESSMENT AND PLAN:    Contreras Pearson is a 52 year old male with history of cigarette smoking who presents to emergency room with abdominal pain and nausea found to have elevated LFTs and lipase suggestive of gallstone pancreatitis.    1.  Suspected gallstone pancreatitis: CT and ultrasound imaging without obvious obstruction.  There is some mild biliary dilation measuring 0.8 cm in the common bile duct.  The patient's transaminases and elevated bilirubin suggestive of an obstructive process.  The patient's lipase has improved from 24,000 down to 7000 today.  He continues to have epigastric and right upper quadrant discomfort.  Patient is afebrile without significant leukocytosis.  His INR is normal    --Plan for EUS with ERCP this afternoon at 230.  The patient will need to be transferred to [facility].  He is to arrive at 1230.   Discharge readmitted transfer orders have been placed.  Further recommendations to follow.  Continue n.p.o., IV fluids, pain control.      I discussed the patient plan with Dr. Brito. Thank you for asking us to participate in the care of this patient.    Princess Greenfield PA-C  [facility]

## 2017-12-22 NOTE — ANESTHESIA CARE TRANSFER NOTE
Patient: Contreras Pearson    Procedure(s):  ENDOSCOPIC ULTRASOUND - Wound Class: II-Clean Contaminated    Diagnosis: .  Diagnosis Additional Information: No value filed.    Anesthesia Type:   MAC     Note:  Airway :Face Mask  Patient transferred to:PACU  Comments: Pt to PACU with O2 via mask, airway patent, VSS.  Report to RN.Handoff Report: Identifed the Patient, Identified the Reponsible Provider, Reviewed the pertinent medical history, Discussed the surgical course, Reviewed Intra-OP anesthesia mangement and issues during anesthesia, Set expectations for post-procedure period and Allowed opportunity for questions and acknowledgement of understanding      Vitals: (Last set prior to Anesthesia Care Transfer)    CRNA VITALS  12/22/2017 1524 - 12/22/2017 1602      12/22/2017             Pulse: 87    SpO2: 97 %    Resp Rate (set): 10                Electronically Signed By: RASHEED Goldman CRNA  December 22, 2017  4:02 PM

## 2017-12-22 NOTE — PLAN OF CARE
Problem: Pancreatitis, Acute/Chronic (Adult)  Goal: Signs and Symptoms of Listed Potential Problems Will be Absent, Minimized or Managed (Pancreatitis, Acute/Chronic)  Signs and symptoms of listed potential problems will be absent, minimized or managed by discharge/transition of care (reference Pancreatitis, Acute/Chronic (Adult) CPG).   Outcome: No Change    RN shift [id number]:  From ED at 2040, A/O.  NPO.  Low K+3.3, 2nd bag replacements infusing.  Abdominal pain managed with IV dilaudid + rest, declined cold pack.  Nauseated, IV compazine x1.  LS diminished bilaterally, RA.  BS/gas+, LBM today.  Up with SBA.  Oriented to room and call system, denies questions.  Educated on IS use hourly, denies question.

## 2017-12-22 NOTE — IP AVS SNAPSHOT
` `           [facility] PACU: [phone]                 INTERAGENCY TRANSFER FORM - NOTES (H&P, Discharge Summary, Consults, Procedures, Therapies)   2017                    Hospital Admission Date: 2017  RBOERT PEREZ   : 1965  Sex: Male        Patient PCP Information     Provider PCP Type    [facility]         History & Physicals      H&P by Kelvin Bangura MD at 2017  8:22 PM     Author:  Kelvin Bangura MD Service:  Hospitalist Author Type:  Physician    Filed:  2017  8:22 PM Date of Service:  2017  8:22 PM Creation Time:  2017  8:12 PM    Status:  Signed :  Kelvin Bangura MD (Physician)         [facility]  Hospitalist Admission Note  Name: Robert Perez    MRN: [id number]  YOB: 1965    Age: 52 year old  Date of admission: 2017  Primary care provider: Princess, Radha Caldwell            Assessment and Plan:   Robert Perez is a 52 year old male with no apparent medical history who presented back in the ED toady due to increasing abdominal pain and nausea that occurred earlier today. He was seen in the ED 2 days ago for intermittent abdominal pain, nausea and was found with jaundice, hyperbilirubinemia and small gallstone with no evidence of biliary obstruction. He has no significant pain at that time and was sent home with set-up for outpatient f/u and work-up. He came back today consistent with prior instructions due to worsening abdominal pain.    1. Abdominal pain secondary to biliary pancreatitis  2. Choledocholithiasis and cholelithiasis  3. Jaundice secondary to hyperbilirubinemia from #2  4. Acute hepatitis secondary to #1 and #2    Admit as inpatient. High risk for clinical deterioration.   Keep NPO, aggressive IVF support. Optimize pain control. No prior hx of narcotic use or abuse as per patient. May need IV PCA if pain is not under control.  Continue with IV  Invanz as started earlier.  IV pepcid.  GI evaluation with case discussed at the ED earlier. Likely will need ERCP  May need surgery service evaluation as well for gallbladder evaluation down the road if needing cholecystectomy.      Code status: full  Admit to inpatient  Prophylaxis: PCD's  Disposition: home in > 2days          Chief Complaint:   Increasing abdominal pain and nausea       Source of Information:   Patient with good reliability  Discussion with ED physician  Review of E chart records         History of Present Illness:   Contreras Pearson is a 52 year old male with no apparent medical history who presented back in the ED toady due to increasing abdominal pain and nausea that occurred earlier today. He was seen in the ED 2 days ago for intermittent abdominal pain, nausea and was found with jaundice, hyperbilirubinemia and small gallstone with no evidence of biliary obstruction. He has no significant pain at that time and was sent home with set-up for outpatient f/u and work-up. He came back today consistent with prior instructions due to worsening abdominal pain.  Work-up now showed elevated lipase, increasing abnormal LFT's and bilirubinemia. He is in significant amount of pain earlier and required IV narcotics. His case was discussed by the ED physician with the GI service and recommending admission, IVF support, NPO and plans for ERCP.  During the time of exam he is still having intermittent pain but no nausea/vomiting. He is conversant but appears anxious due to pain.          Past Medical History:   History reviewed. No pertinent past medical history.          Past Surgical History:   History reviewed. No pertinent surgical history.          Social History:     Social History   Substance Use Topics     Smoking status: Current Every Day Smoker     Packs/day: 1.00     Smokeless tobacco: Never Used     Alcohol use Yes      Comment: occasionally             Family History:   Family history was fully  reviewed and non-contributory in this case.         Allergies:     Allergies   Allergen Reactions     Penicillins      Sulfa Drugs              Medications:     Prior to Admission medications    Medication Sig Last Dose Taking? Auth Provider   SUCRALFATE PO Take 1 g by mouth 4 times daily  12/21/2017 at x2 Yes Reported, Patient   PANTOPRAZOLE SODIUM PO Take 40 mg by mouth every morning (before breakfast)  12/21/2017 at Unknown time Yes Reported, Patient             Review of Systems:   A Comprehensive greater than 10 system review of systems was carried out.  Pertinent positives and negatives are noted above.  Otherwise negative for contributory information.           Physical Exam:   Blood pressure (!) 175/95, pulse 91, temperature 97.8  F (36.6  C), temperature source Oral, resp. rate 18, weight 103.4 kg (228 lb), SpO2 92 %.  Wt Readings from Last 1 Encounters:   12/21/17 103.4 kg (228 lb)     Exam:  GENERAL: No apparent respiratory distress. Awake, alert, and fully oriented.  HEENT: Normocephalic, atraumatic. Extraocular movements intact.  CARDIOVASCULAR: Regular rate and rhythm without murmurs or rubs. No JVD  PULMONARY: Clear to auscultation, no wheezes, crackles  ABDOMINAL: Soft, tender at the epigastric area, non-distended. Bowel sounds normoactive.   EXTREMITIES: No cyanosis or clubbing. No edema.  NEUROLOGICAL: CN 2-12 grossly intact, awake and alert x3, spontaneous and coherent speech. no focal neurological deficits.  DERMATOLOGICAL: No rash, ulcer, ecchymoses, jaundice.  Psych: anxious due to pain  Lymph nodes: no obvious palpable  cervical or axillary lymphadenopathy         Data:   EKG:  NSR at 92 bpm, multipke artifacts    Imaging:[AG1.1]  Results for orders placed or performed during the hospital encounter of 12/21/17   US Abdomen Complete    Narrative    COMPLETE ABDOMEN ULTRASOUND  12/21/2017 5:47 PM    HISTORY: Abdominal pain.     FINDINGS: Tiny gallstone present dependently within the gallbladder  on  12/20/2017 CT is not sonographically visible. No gallbladder wall  thickening. The common bile duct is mildly dilated measuring 0.8 cm in  diameter. The liver, spleen, kidneys, as well as the visualized  portions of the pancreas, abdominal aorta and IVC appear within normal  limits.      Impression    IMPRESSION:    1. Small gallstone seen one day ago by CT is either not  sonographically visible or may have passed out of the gallbladder.  2. Common bile duct dilatation.[AG1.2]         Labs:[AG1.1]  No results for input(s): CULT in the last 168 hours.    Recent Labs  Lab 12/21/17  1701 12/19/17  2300   WBC 24.5* 5.8   HGB 16.5 14.5   HCT 48.4 42.3   MCV 87 88    261       Recent Labs  Lab 12/21/17  1701 12/19/17  2300    141   POTASSIUM 3.3* 3.9   CHLORIDE 106 108   CO2 24 28   ANIONGAP 11 5   * 139*   BUN 11 10   CR 0.90 0.77   GFRESTIMATED 89 >90   GFRESTBLACK >90 >90   MARLENE 8.9 8.5   PROTTOTAL 7.7 6.8   ALBUMIN 3.9 3.3*   BILITOTAL 3.4* 2.0*   ALKPHOS 497* 379*   * 283*   * 453*       Recent Labs  Lab 12/21/17  1701 12/19/17  2300   * 139*       Recent Labs  Lab 12/21/17  1701 12/19/17  2300   INR 0.89 0.89       Recent Labs  Lab 12/21/17  1701 12/19/17  2300   LIPASE 43779* 187       Recent Labs  Lab 12/19/17  2324   TROPONIN 0.00       Recent Labs  Lab 12/21/17  1940   COLOR Yellow   APPEARANCE Clear   URINEGLC 50*   URINEBILI Negative   URINEKETONE Negative   SG 1.009   UBLD Negative   URINEPH 5.0   PROTEIN Negative   NITRITE Negative   LEUKEST Negative   RBCU 1   WBCU <1[AG1.2]          Revision History        User Key Date/Time User Provider Type Action    > AG1.2 12/21/2017  8:22 PM Kelvin Bangura MD Physician Sign     AG1.1 12/21/2017  8:12 PM Kelvin Bangura MD Physician                   Discharge Summaries     No notes of this type exist for this encounter.         Consult Notes      Consults by Katia Greenfield PA-C at 12/22/2017  9:40 AM      Author:  Katia Greenfield PA-C Service:  Gastroenterology Author Type:  Physician Assistant    Filed:  12/22/2017 10:08 AM Date of Service:  12/22/2017  9:40 AM Creation Time:  12/22/2017  9:38 AM    Status:  Attested :  Katia Greenfield PA-C (Physician Assistant)    Cosigner:  Antoni Flanagan MD at 12/22/2017 11:47 AM         Consult Orders:    1. Gastroenterology IP Consult: case discussed with Dr Hutchins at the ED , biliary pancratitis and choledocholithiasis; Consultant may enter orders: Yes; Patient to be seen: Routine - within 24 hours; Requested Clinic/Group: [facility] [[id number]] ordered by Kelvin Bangura MD at 12/21/17 2012           Attestation signed by Antoni Flanagan MD at 12/22/2017 11:47 AM        Pt seen and examined, case discussed.  Pain continues.  Unclear if CBD stone still present.  Pain may be from pancreatitis.  EUS/ERCP today.    RRR  CTA  Active BS, soft, diffusely tender                               GASTROENTEROLOGY CONSULTATION      Contreras Pearson  [address] [facility] [id number]  52 year old male     Admission Date/Time: 12/21/2017  [facility]  Referring / Attending Physician:[KK1.1]  Dr. Bangura[KK1.2]     We were asked to see the patient in consultation by [KK1.1] Willem[KK1.2] for evaluation of[KK1.1] abdominal pain[KK1.2].        HPI:  Contreras Pearson is a 52 year old male[KK1.1] with history of cigarette smoking who presents to emergency room with abdominal pain and nausea.    Patient has been experiencing intermittent epigastric and right upper quadrant pain for a number of years.  She is to the last 2 months the pain has become more persistent and occurs after eating.  He sought medical attention at his primary care clinic.  LFTs were elevated and there are plans for a CT scan.  A CT scan completed on December 20 showed a normal-appearing gallbladder, a tiny gallstone in the  gallbladder, but no evidence of biliary obstruction.  His pain persisted so he came to the emergency room.  LFTs had continued to rise with an elevation in his lipase.  ALT was 589, , alkaline phosphatase 497, total bilirubin 3.4.  Lipase was 24,000.  An ultrasound was repeated.  This did show some mild CBD dilation.  The gallstone was no longer seen in the gallbladder.    The patient denies any previous abdominal surgeries.  He will rarely drink alcohol.  He smokes 1 pack of cigarettes daily.  He denies any melena or hematochezia.  He is unaware of any family history of gallbladder or pancreas problems. The he denies any significant findings patient did undergo a screening colonoscopy last year at age 51.  No fever or chills, no further vomiting.  He denies any history of IV drug use.  No previous knowledge of liver disease or hepatitis.[KK1.2]       PAST MEDICAL HISTORY:[KK1.1]  Patient Active Problem List    Diagnosis Date Noted     Choledocholithiasis 12/21/2017     Priority: Medium[KK1.3]          ROS: A comprehensive ten point review of systems was negative aside from those in mentioned in the HPI.       MEDICATIONS:   Prior to Admission medications    Medication Sig Start Date End Date Taking? Authorizing Provider   SUCRALFATE PO Take 1 g by mouth 4 times daily    Yes Reported, Patient   PANTOPRAZOLE SODIUM PO Take 40 mg by mouth every morning (before breakfast)    Yes Reported, Patient        ALLERGIES:[KK1.1]   Allergies   Allergen Reactions     Penicillins      Sulfa Drugs[KK1.3]         SOCIAL HISTORY:[KK1.1]  Social History   Substance Use Topics     Smoking status: Current Every Day Smoker     Packs/day: 1.00     Smokeless tobacco: Never Used     Alcohol use Yes      Comment: occasionally[KK1.3]        FAMILY HISTORY:[KK1.1] Noncontributory[KK1.2]        PHYSICAL EXAM:[KK1.1]     BP (!) 167/98 (BP Location: Left arm)  Pulse 90  Temp 95.7  F (35.4  C) (Oral)  Resp 16  Wt 104 kg (229 lb 3.2 oz)   SpO2 93%[KK1.3]     PHYSICAL EXAM:  GENERAL:[KK1.1] Uncomfortable[KK1.2]  SKIN: no suspicious lesions, rashes  HEAD: Normocephalic. Atraumatic.  NECK: Neck supple. No adenopathy.   RESPIRATORY: Good transmission. CTA bilaterally.   CARDIOVASCULAR: RRR, normal S1, S2,  No murmur appreciated  GASTROINTESTINAL: +BS, soft,[KK1.1] epigastric and right upper quadrant tenderness[KK1.2], non distended, no guarding/rebound  JOINT/EXTREMITIES:  no gross deformities noted, normal muscle tone  NEURO: CN 2-12 grossly intact, no focal deficits  PSYCH: Normal affect        ADDITIONAL COMMENTS:   I reviewed the patient's new clinical lab test results.[KK1.1]   Recent Labs   Lab Test  12/22/17   0409  12/21/17   1701  12/19/17   2300   WBC  17.2*  24.5*  5.8   HGB  14.7  16.5  14.5   MCV  88  87  88   PLT  303  375  261   INR   --   0.89  0.89     Recent Labs   Lab Test  12/22/17   0409  12/21/17   1701  12/19/17   2300   POTASSIUM  4.3  3.3*  3.9   CHLORIDE  109  106  108   CO2  26  24  28   BUN  13  11  10   ANIONGAP  6  11  5     Recent Labs   Lab Test  12/22/17   0409  12/21/17   1940  12/21/17   1701  12/20/17   0018  12/19/17   2300   ALBUMIN  3.3*   --   3.9   --   3.3*   BILITOTAL  2.5*   --   3.4*   --   2.0*   ALT  464*   --   589*   --   453*   AST  251*   --   357*   --   283*   PROTEIN   --   Negative   --   Negative   --    LIPASE  7066*   --   23675*   --   187[KK1.3]        IMAGING / ENDOSCOPY[KK1.1]    COMPLETE ABDOMEN ULTRASOUND  12/21/2017 5:47 PM     FINDINGS: Tiny gallstone present dependently within the gallbladder on  12/20/2017 CT is not sonographically visible. No gallbladder wall  thickening. The common bile duct is mildly dilated measuring 0.8 cm in  diameter. The liver, spleen, kidneys, as well as the visualized  portions of the pancreas, abdominal aorta and IVC appear within normal  limits.    IMPRESSION:    1. Small gallstone seen one day ago by CT is either not  sonographically visible or may have  passed out of the gallbladder.  2. Common bile duct dilatation.      CT ABDOMEN AND PELVIS WITH CONTRAST  12/20/2017 3:12 AM   IMPRESSION:  1. No cause of acute pain identified in the abdomen or pelvis.  2. No evidence of biliary obstruction.  3. Tiny gallstone in the gallbladder.[KK1.2]      CONSULTATION ASSESSMENT AND PLAN:    Contreras Pearson is a 52 year old male[KK1.1] with history of cigarette smoking who presents to emergency room with abdominal pain and nausea found to have elevated LFTs and lipase suggestive of gallstone pancreatitis.    1.  Suspected gallstone pancreatitis: CT and ultrasound imaging without obvious obstruction.  There is some mild biliary dilation measuring 0.8 cm in the common bile duct.  The patient's transaminases and elevated bilirubin suggestive of an obstructive process.  The patient's lipase has improved from 24,000 down to 7000 today.  He continues to have epigastric and right upper quadrant discomfort.  Patient is afebrile without significant leukocytosis.  His INR is normal    --Plan for EUS with ERCP this afternoon at 230.  The patient will need to be transferred to [facility].  He is to arrive at 1230.  Discharge readmitted transfer orders have been placed.  Further recommendations to follow.  Continue n.p.o., IV fluids, pain control.[KK1.2]      I discussed the patient plan with [KK1.1] Alisha[KK1.2]. Thank you for asking us to participate in the care of this patient.    Princess Greenfield PA-C  [facility][KK1.1]       Revision History        User Key Date/Time User Provider Type Action    > KK1.2 12/22/2017 10:08 AM Katia Greenfield PA-C Physician Assistant Sign     KK1.3 12/22/2017  9:40 AM Katia Greenfield PA-C Physician Assistant      KK1.1 12/22/2017  9:38 AM Katia Greenfield PA-C Physician Assistant                      Progress Notes - Physician (Notes from 12/19/17 through 12/22/17)      Progress Notes by Chela Wright RN  at 12/22/2017 10:50 AM     Author:  Chela Wright RN Service:  (none) Author Type:  Registered Nurse    Filed:  12/22/2017 12:12 PM Date of Service:  12/22/2017 10:50 AM Creation Time:  12/22/2017 10:50 AM    Status:  Addendum :  Chela Wright RN (Registered Nurse)           Report called to JODEE Saab at [facility] pre-op department. Dr. Fields aware of wife transporting pt to [facility], plan to arrive at 1230 for ERCP at 1430. Will remove IV prior to pt leaving this hospital.[TF1.1]     IV removed. Belongings packed up in car just in case. Pt brought to car in wheelchair. Spouse driving pt to [facility], left at noon. Plan for pt to come back to [facility] after ERCP per GI (GUI Greenfield).[TF1.2]     Revision History        User Key Date/Time User Provider Type Action    > TF1.2 12/22/2017 12:12 PM Chela Wright RN Registered Nurse Addend     TF1.1 12/22/2017 10:54 AM Chela Wright RN Registered Nurse Sign            ED Provider Notes by Raz Aguilar MD at 12/21/2017  4:51 PM     Author:  Raz Aguilar MD Service:  Emergency Medicine Author Type:  Physician    Filed:  12/21/2017  9:56 PM Date of Service:  12/21/2017  4:51 PM Creation Time:  12/21/2017  4:55 PM    Status:  Signed :  Raz Aguilar MD (Physician)           History     Chief Complaint:  Abdominal pain    HPI   Contreras Pearson is a 52 year old male who presents with abdominal pain.[AF1.1] The patient states that he has been experiencing multiple symptoms for the last few weeks including abdominal pain, nausea, diarrhea, and heart burn. He was then seen here in the ED 2 days ago for jaundice when he had a gallstone found via CT, ultrasound, and x ray. He was then at home this afternoon when shortly after eating a meal he had sudden onset of severe upper right quadrant abdominal pain with an episode of vomiting as well. He then called his wife and went to clinic where he was recommended to come here to the ED for further evaluation. While here he endorses  the same symptoms he has been experiencing recently, though now much more severe. He denies any recent fevers however. He states that he has used the lidocaine he was prescribed for breakthrough pain with little relief.    Results from 12/19/17:  XR Chest Port 1 view  IMPRESSION:  1. No cause of acute pain identified in the abdomen or pelvis.  2. No evidence of biliary obstruction.  3. Tiny gallstone in the gallbladder.  Report per radiology.      Abdomen U, limited:  IMPRESSION:  1. Small gallstone in the gallbladder. No evidence of acute cholecystitis.  2. No biliary dilatation.  Report per radiology.     Abdomen/Pelvis CT with IV contrast:  IMPRESSION:  1. No cause of acute pain identified in the abdomen or pelvis.  2. No evidence of biliary obstruction.  3. Tiny gallstone in the gallbladder.  Report per radiology.[AF1.2]     Allergies:  Penicillins  Sulfa drugs     Medications:    Sucralfate  Pantoprazole     Past Medical History:    The patient denies any relevant past medical history.    Past Surgical History:    History reviewed. No pertinent past surgical history.    Family History:    The patient denies any relevant family medical history.    Social History:  The patient was accompanied to the ED by[AF1.1] his wife[AF1.2].  Smoking Status: Yes  Smokeless Tobacco: No  Alcohol Use: No  Marital Status:       Review of Systems   Constitutional: Negative for[AF1.1] fever[AF1.2].   Respiratory: Positive for[AF1.1] chest tightness[AF1.2].    Gastrointestinal: Positive for[AF1.1] abdominal pain[AF1.2],[AF1.1] diarrhea[AF1.2],[AF1.1] nausea[AF1.2] and[AF1.1] vomiting[AF1.2].[AF1.1]   All other systems reviewed and are negative[AF1.2].    Physical Exam   Vitals:[AF1.1]  Patient Vitals for the past 24 hrs:   BP Temp Temp src Pulse Heart Rate Resp SpO2 Weight   12/21/17 2130 - - - - - 18 - -   12/21/17 2056 - - - - - 20 - -   12/21/17 2054 163/80 - - - - - - -   12/21/17 2050 - - - - - 20 - -   12/21/17 2048 (!)  160/95 - - - - - - -   12/21/17 2044 (!) 156/94 95.4  F (35.2  C) Oral 84 - 16 90 % -   12/21/17 2015 (!) 171/97 - - - - - - -   12/21/17 2000 (!) 172/104 - - - - - - -   12/21/17 1945 (!) 180/110 - - - - - - -   12/21/17 1930 (!) 177/100 - - - - - - -   12/21/17 1915 (!) 216/185 - - - - - - -   12/21/17 1900 177/90 - - - - - - -   12/21/17 1845 (!) 175/95 - - - - - 92 % -   12/21/17 1830 (!) 161/93 - - - - - 95 % -   12/21/17 1815 (!) 172/96 - - - - - 96 % -   12/21/17 1800 (!) 163/97 - - - - - 97 % -   12/21/17 1745 157/89 - - - - - 99 % -   12/21/17 1715 155/90 - - - 84 27 98 % -   12/21/17 1700 (!) 182/101 97.8  F (36.6  C) Oral - 92 16 96 % -   12/21/17 1658 - - - 91 91 18 96 % 103.4 kg (228 lb)[AF1.3]     Physical Exam   Constitutional: He appears[AF1.1] well-developed[AF1.2] and[AF1.1] well-nourished[AF1.2]. He appears[AF1.1] distressed[WC1.1].   HENT:   Right Ear:[AF1.1] External ear[AF1.2] normal.   Left Ear:[AF1.1] External ear[AF1.2] normal.   Mouth/Throat:[AF1.1] Oropharynx is clear and moist[AF1.2]. No[AF1.1] oropharyngeal exudate[AF1.2].[AF1.1]   TM's clear bilaterally[AF1.2]   Eyes:[AF1.1] Conjunctivae[AF1.2] are normal.[AF1.1] Pupils are equal, round, and reactive to light[AF1.2].[AF1.1] No scleral icterus[AF1.2].   Neck:[AF1.1] Normal range of motion[AF1.2].[AF1.1] Neck supple[AF1.2].   Cardiovascular:[AF1.1] Normal rate[AF1.2],[AF1.1] regular rhythm[AF1.2],[AF1.1] normal heart sounds[AF1.2] and[AF1.1] intact distal pulses[AF1.2].  Exam reveals[AF1.1] no gallop[AF1.2] and[AF1.1] no friction rub[AF1.2].[AF1.1]    No murmur[AF1.2] heard.  Pulmonary/Chest:[AF1.1] Effort normal[AF1.2] and[AF1.1] breath sounds normal[AF1.2]. No[AF1.1] respiratory distress[AF1.2]. He has[AF1.1] no wheezes[AF1.2]. He has[AF1.1] no rales[AF1.2].   Abdominal:[AF1.1] Soft[AF1.2].[AF1.1] Bowel sounds are normal[AF1.2]. He exhibits[AF1.1] no distension[AF1.2] and[AF1.1] no mass[AF1.2]. There is[AF1.1] tenderness (Right upper  quadrant and epigastric TTP)[WC1.1]. There is[AF1.1] rebound[WC1.1] and[AF1.1] guarding[WC1.1].   Musculoskeletal:[AF1.1] Normal range of motion[AF1.2]. He exhibits no[AF1.1] edema[AF1.2].   Neurological: He is[AF1.1] alert[AF1.2].   Skin: Skin is[AF1.1] warm[AF1.2] and[AF1.1] dry[AF1.2].[AF1.1] No rash[AF1.2] noted.   Psychiatric:[AF1.1]   Anxious, crying[WC1.1]     Emergency Department Course     ECG:[AF1.1]  ECG taken at 1656, ECG read at 1567  Normal sinus rhythm  Possible left atrial enlargement  Nonspecific ST abnormality  Abnormal ECG  Rate 92 bpm. KS interval 130. QRS duration 94. QT/QTc 366/452. P-R-T axes 40 28 56.[AF1.4]    Imaging:  Radiology findings were communicated with the patient who voiced understanding of the findings.[AF1.1]  US Abdomen Complete:[AF1.2]  1. Small gallstone seen one day ago by CT is either not sonographically visible or may have passed out of the gallbladder.  2. Common bile duct dilatation.  [AF1.5]  Per Radiologist.[AF1.2]     Laboratory:  Laboratory findings were communicated with the patient who voiced understanding of the findings.[AF1.1]  CBC:[AF1.2] WBC: 24.5 (H)[AF1.5] o/w WNL. (HGB[AF1.2] 16.5[AF1.5], PLT[AF1.2] 375[AF1.5])   INR:[AF1.2] 0.89[AF1.5]  CMP:[AF1.2] Potassium: 3.3 (L), Glucose: 232 (H), Bilirubin total: 3.4 (H), ALKPHOS: 497 (H), ALT: 589 (HH), AST: 357 (H) o/w[AF1.5] WNL (Creatinine[AF1.2] 0.90[AF1.5])  Lipase:[AF1.2] 38149 (H)[AF1.5]  UA:[AF1.2] Glucose: 50 (A), Mucous: Present (A)  Lactic acid (collected at 1826): 1.4  INR: 0.89[AF1.6]    Interventions:[AF1.1]  1708 Normal Saline 1000 mL IV  1708 Zofran, 4 mg, IV  1740 Morphine, 4 mg, IV[AF1.5]  1825 Normal Saline 1000 mL IV  1826 Morphine, 4 mg, IV[AF1.7]  1838 InVanz, 1 g , IV  1953 Dilaudid, 1 mg, IV[AF1.6]  2056 Pepcid, 20 mg, IV  2129 Compazine, 10 mg, PO  2129 Compazine, 25 mg, Rectal  2056 Dilaudid, 0.5 mg, IV[AF1.8]     Emergency Department Course:  Nursing notes and vitals reviewed.[AF1.1]  0266  I had my initial encounter with the patient.[AF1.5]  I performed an exam of the patient as documented above.[AF1.1]   EKG obtained in the ED, see results above.[AF1.7]   IV was inserted and blood was drawn for laboratory testing, results above.  The patient provided a urine sample here in the emergency department. This was sent for laboratory testing, findings above.  The patient was sent for a US while in the emergency department, results above.   I spoke with [AF1.5] Mary of [facility][AF1.9] regarding this patient.  I spoke with [AF1.5] Willem[AF1.8] regarding this patient.[AF1.5]    I discussed the treatment plan with the patient. They expressed understanding of this plan and consented to admission. I discussed the patient with Dr Bangura, who will admit the patient to a monitored bed for further evaluation and treatment.[AF1.8]    Impression & Plan      Medical Decision Making:[AF1.1]  Contreras Pearson is a 52 year old male who presents to the emergency department today with severe right upper quadrant abdominal pain with onset around 3:30 this afternoon. The patient was seen two days ago by Dr. Le for jaundice and vague abdominal discomfort. At that time he did have elevated LFTs, mild bilirubin elevation, and a small gallstone. We did a repeat ultrasound. His labs are much worse, including increased elevation of bilirubin. He also has a white count of 24.5. He has been afebrile here as well as at home. His lipase is extremely elevated suggesting that this is a biliary pancreatitis. The gallstone that was seen is no longer seen at this point on ultrasound, but there is also common bile duct dilation to suggest choledocholithiasis. I discussed the finding with the GI physician on call and he suggest ERCP, however that is not done tomorrow at [facility]. He did recommend transfer to [facility], however there are no beds so we will keep the patient here and transfer him the ERCP tomorrow morning. The  patient was given Zosyn here. He will be admitted by Dr. Bangura. The patient's pain is better controled with Dilaudid at this point. He is kept NPO and is made aware of his diagnosis and agrees with the plan.[AF1.8]    Diagnosis:[AF1.1]    ICD-10-CM    1. Acute biliary pancreatitis, unspecified complication status K85.10 UA with Microscopic     Lactic acid   2. Choledocholithiasis K80.50[AF1.3]      Disposition:[AF1.1]   Admission[AF1.8]    Scribe Disclosure:  I, Rocael Rosa, am serving as a scribe at 4:55 PM on 12/21/2017 to document services personally performed by Raz Aguilar MD, based on my observations and the provider's statements to me.    12/21/2017   [facility] EMERGENCY DEPARTMENT[AF1.1]       Raz Aguilar MD  12/21/17 2156  [WC1.1]     Revision History        User Key Date/Time User Provider Type Action    > WC1.1 12/21/2017  9:56 PM Raz Aguilar MD Physician Sign     AF1.3 12/21/2017  9:37 PM Rocael Rosa Scribe Share     AF1.8 12/21/2017  9:29 PM Rocael Rosa Scribe      AF1.6 12/21/2017  7:55 PM Rocael Rosa Scribe Share     [N/A] 12/21/2017  6:28 PM Rocael Rosa Scribe Share     AF1.7 12/21/2017  6:27 PM Rocael Rosa Scribe Share     AF1.9 12/21/2017  6:26 PM Rocael Rosa Scribe      AF1.5 12/21/2017  6:18 PM Rocael Rosa Scribe Share     [N/A] 12/21/2017  5:10 PM Rocael Rosa Scribe Share     AF1.4 12/21/2017  5:09 PM Rocael Rosa Scribe Share     AF1.2 12/21/2017  4:56 PM Rocael Rosa Scribe Share     AF1.1 12/21/2017  4:55 PM Moe, Rocael Scribe             ED Notes by Yesica Skelton RN at 12/21/2017  6:55 PM     Author:  Yesica Skelton RN Service:  (none) Author Type:  Registered Nurse    Filed:  12/21/2017  8:25 PM Date of Service:  12/21/2017  6:55 PM Creation Time:  12/21/2017  6:59 PM    Status:  Addendum :  Yesica Skelton RN (Registered Nurse)         [facility]  ED Nurse Handoff  Report    Contreras Pearson is a 52 year old male   ED Chief complaint: Abdominal Pain  . ED Diagnosis:   Final diagnoses:   Acute biliary pancreatitis, unspecified complication status   Choledocholithiasis     Allergies:   Allergies   Allergen Reactions     Penicillins      Sulfa Drugs        Code Status: Full Code  Activity level - Baseline/Home:  Independent. Activity Level - Current:   Stand with Assist. Lift room needed: No. Bariatric: No   Needed: No   Isolation: No. Infection: Not Applicable.     Vital Signs:   Vitals:    12/21/17 1658 12/21/17 1700 12/21/17 1745 12/21/17 1800   BP:  (!) 175/113 157/89 (!) 163/97   Pulse: 91      Resp: 18      Temp:  97.8  F (36.6  C)     TempSrc:  Oral     SpO2: 96%  99% 97%   Weight: 103.4 kg (228 lb)          Cardiac Rhythm:  ,      Pain level: 0-10 Pain Scale: 6  Patient confused: No. Patient Falls Risk: Yes.   Elimination Status: Has not voided while in ED.   Patient Report - Initial Complaint: Severe abdominal pain upper right quadrant, began after pt ate potato salad and pumpkin pie this afternoon. Focused Assessment: Pain upper right quadrant, nausea, pt diaphoretic upon arrival. Three doses of morphine IV administered with minimal relief. PT was diagnosed with gallstones a few days ago. Wife at bedside, pt alert and oriented x4.   Tests Performed: Labs, ultrasound. Abnormal Results:[CC1.1]   Labs Ordered and Resulted from Time of ED Arrival Up to the Time of Departure from the ED   CBC WITH PLATELETS DIFFERENTIAL - Abnormal; Notable for the following:        Result Value    WBC 24.5 (*)     RDW 15.4 (*)     Absolute Neutrophil 17.6 (*)     Absolute Monocytes 1.6 (*)     All other components within normal limits   COMPREHENSIVE METABOLIC PANEL - Abnormal; Notable for the following:     Potassium 3.3 (*)     Glucose 232 (*)     Bilirubin Total 3.4 (*)     Alkaline Phosphatase 497 (*)      (*)      (*)     All other components within normal limits    LIPASE - Abnormal; Notable for the following:     Lipase 15765 (*)     All other components within normal limits   INR   LACTIC ACID   ROUTINE UA WITH MICROSCOPIC   PERIPHERAL IV CATHETER     US Abdomen Complete   Preliminary Result   IMPRESSION:     1. Small gallstone seen one day ago by CT is either not   sonographically visible or may have passed out of the gallbladder.   2. Common bile duct dilatation.[CC1.2]          .   Treatments provided: IV pain medications, Invanz, IV fluids  Family Comments: Wife at bedside  OBS brochure/video discussed/provided to patient:  Yes  ED Medications:   Medications   0.9% sodium chloride BOLUS (0 mLs Intravenous Stopped 12/21/17 1825)     Followed by   0.9% sodium chloride infusion (not administered)   ondansetron (ZOFRAN) injection 4 mg (4 mg Intravenous Given 12/21/17 1708)   0.9% sodium chloride BOLUS (1,000 mLs Intravenous New Bag 12/21/17 1825)   morphine (PF) injection 4 mg (4 mg Intravenous Given 12/21/17 1826)   ertapenem (INVanz) 1 g in 10 mL SWFI for IVP (1 g Intravenous Given 12/21/17 1838)     Drips infusing:  Yes IV fluids  For the majority of the shift, the patient's behavior Green. Interventions performed were N/A.     Severe Sepsis OR Septic Shock Diagnosis Present: No      ED Nurse Name/Phone Number: Aimee Mena,   6:55 PM[CC1.1]    RECEIVING UNIT ED HANDOFF REVIEW    Above ED Nurse Handoff Report was reviewed: YES  Reviewed by: Yesica Skelton on December 21, 2017 at 8:25 PM[AV1.1]      Revision History        User Key Date/Time User Provider Type Action    > AV1.1 12/21/2017  8:25 PM Yesica Skelton RN Registered Nurse Addend     CC1.2 12/21/2017  6:59 PM Aimee Mena RN Registered Nurse Sign     CC1.1 12/21/2017  6:55 PM Aimee Mena RN Registered Nurse             ED Notes by Aimee Mena RN at 12/21/2017  4:59 PM     Author:  Aimee Mena RN Service:  (none) Author Type:  Registered Nurse    Filed:  12/21/2017  5:00 PM Date of  "Service:  12/21/2017  4:59 PM Creation Time:  12/21/2017  4:59 PM    Status:  Signed :  Aimee Mena RN (Registered Nurse)         Pt diagnosed with gallstones about two days ago. This afternoon began experiencing severe abdominal pain in upper right quadrant right after eating. Pt diaphoretic. Alert and oriented, ABCs intact.[CC1.1]      Revision History        User Key Date/Time User Provider Type Action    > CC1.1 12/21/2017  5:00 PM Aimee Mena RN Registered Nurse Sign            ED Provider Notes by Masoud Le MD at 12/19/2017  8:35 PM     Author:  Masoud Le MD Service:  Emergency Medicine Author Type:  Physician    Filed:  12/20/2017  4:52 AM Date of Service:  12/19/2017  8:35 PM Creation Time:  12/19/2017 11:04 PM    Status:  Addendum :  Masoud Le MD (Physician)           History     Chief Complaint:  Jaundice    HPI   Contreras Pearson is a 52 year old male who presents with jaundice. The patient reports that he has a history of acid reflux and has been worked up for this in the past which showed no cardiac complications. The patient stets that he chronically gets acid reflux in his upper abdomen/middle chest at a 1/10 which goes up to a 3/10. As of recently he has developed mid sternal chest pains which he states is different from his chronic acid reflux. He states that this pain has been accompanied by nausea, weight loss, diarrhea, vomiting, subjective fevers, and general body aches. He states he has gone to his PMS and had a urine and blood work done; the urine showed that he was \"eating his own proteins\" and his blood work showed liver inflammation. He was referred to come to the ER if he developed jaundice of the skin or eyes. Today he presents with jaundice to both of these areas but denies any other symptom other than the aforementioned.[BW1.1]     CMP Clinic 12/15/17:  Bilirubin  3.4 " (H)  ALKPHOS 316  ALT  421  AST  206[BW1.2]    Allergies:  Penicillins  Sulfa Drugs     Medications:    SUCRALFATE PO  PANTOPRAZOLE SODIUM PO    Past Medical History:    Acid Reflux    Past Surgical History:    No pertinent past surgical history.    Family History:    No pertinent family history.    Social History:  Smoking status: Current smoker  Alcohol use: Yes (very minimal)  Marital Status:       Review of Systems   Constitutional: Positive for fever. Negative for chills and fatigue.   Eyes: Negative for photophobia and visual disturbance.   Respiratory: Negative for cough, chest tightness and shortness of breath.    Cardiovascular: Positive for chest pain. Negative for palpitations and leg swelling.   Gastrointestinal: Positive for diarrhea, nausea and vomiting. Negative for abdominal pain, anal bleeding, blood in stool, constipation and rectal pain.   Genitourinary: Negative for decreased urine volume, difficulty urinating, dysuria, hematuria, scrotal swelling and testicular pain.   Musculoskeletal: Positive for myalgias. Negative for arthralgias, back pain, gait problem, joint swelling, neck pain and neck stiffness.   Skin: Negative for color change and rash.   Neurological: Negative for dizziness, seizures and headaches.   All other systems reviewed and are negative.    Physical Exam[BW1.1]     Patient Vitals for the past 24 hrs:   BP Temp Temp src Pulse Resp SpO2   12/20/17 0416 - - - - - 96 %   12/20/17 0415 138/89 - - - - -   12/20/17 0400 128/86 - - - - -   12/20/17 0345 133/81 - - - - 94 %   12/20/17 0330 137/85 - - - - 96 %   12/20/17 0326 - - - - - 98 %   12/20/17 0325 133/82 - - - - -   12/20/17 0230 - - - - - 97 %   12/19/17 2358 - - - - - 97 %   12/19/17 2356 116/81 - - - - 95 %   12/19/17 2343 - - - - - 96 %   12/19/17 2324 - - - - - 97 %   12/19/17 2311 - - - - - 99 %   12/19/17 2303 - - - - - 99 %   12/19/17 2301 126/85 - - - - -   12/19/17 2051 (!) 154/95 98.8  F (37.1  C) Temporal 77  18 100 %[BW1.3]       Physical Exam  General: The patient is alert, in no respiratory distress.    HENT: Mucous membranes moist. Mild scleral icterus.     Cardiovascular: Regular rate and rhythm. Good pulses in all four extremities. Normal capillary refill and skin turgor.     Respiratory: Lungs are clear. No nasal flaring. No retractions. No wheezing, no crackles.    Gastrointestinal: Abdomen soft. No guarding, no rebound. No palpable hernias. Mild abdominal tenderness    Musculoskeletal: No gross deformity.     Skin: No rashes or petechiae.     Neurologic: The patient is alert and oriented x3. GCS 15. No testable cranial nerve deficit. Follows commands with clear and appropriate speech. Gives appropriate answers. Good strength in all extremities. No gross neurologic deficit. Gross sensation intact. Pupils are round and reactive. No meningismus.     Lymphatic: No cervical adenopathy. No lower extremity swelling.    Psychiatric: The patient is non-tearful.      Emergency Department Course   ECG:[BW1.1]  @ 2338  Indication: Jaundice  Vent. Rate 66 bpm. OK interval 146 ms. QRS duration 96 ms. QT/QTc 400/419 ms. P-R-T axis 60 20 25.   Normal sinus rhythm. Normal ECG.    Read @ 2341 by Dr. Le.[BW1.4]    Imaging:  XR Chest Port 1 view[BW1.1]  IMPRESSION:  1. No cause of acute pain identified in the abdomen or pelvis.  2. No evidence of biliary obstruction.  3. Tiny gallstone in the gallbladder.[BW1.5]  Report per radiology.     Abdomen U, limited:[BW1.1]  IMPRESSION:  1. Small gallstone in the gallbladder. No evidence of acute  cholecystitis.  2. No biliary dilatation.[BW1.5]  Report per radiology.[BW1.1]    Abdomen/Pelvis CT with IV contrast:  IMPRESSION:  1. No cause of acute pain identified in the abdomen or pelvis.  2. No evidence of biliary obstruction.  3. Tiny gallstone in the gallbladder.  Report per radiology.[BW1.5]     Laboratory:  CBC:  WBC[BW1.1] 5.8[BW1.5], HGB[BW1.1] 14.5[BW1.5], PLT[BW1.1] 261[BW1.5],  WNL  CMP:[BW1.1] Glucose 139 (H), Bilirubin 2.0 (H), Albumin 3.3 (L), ALKPHOS 379 (H),  (H),  (H)[BW1.5] otherwise WNL (Creatinine[BW1.1] 0.77[BW1.5])  Lipase:[BW1.1] 187[BW1.5]  INR:[BW1.1] 0.89[BW1.5]  PTT:[BW1.1] 34[BW1.5]  Ammonia:[BW1.1] 38   Acetaminophen: <2  (2300) BAC: <0.01  (232) Troponin POCT: 0.00[BW1.5]    UA: Clear[BW1.1] Claudia[BW1.5] urine,[BW1.1] Mucous Present,[BW1.5] otherwise WNL    Interventions:[BW1.1]  (0256) Normal Saline, 1 liter, IV bolus[BW1.5]    Emergency Department Course:  Nursing notes and vitals reviewed.  (2305) I performed an exam of the patient as documented above.    Blood was drawn from the patient. This was sent for laboratory testing, findings above.   Urine sample was obtained and sent for laboratory analysis, findings above.  The patient was sent for a x-ray[BW1.1], ultrasound, and CT scan[BW1.5] while in the emergency department, findings above.[BW1.1]   EKG was done, interpretation as above.[BW1.4]    (0211) I spoke with Dr. Garcia, the doctor on-call for GI about the patient's condition.[BW1.6] She recommended a CT scan for the patient at this time.[BW1.7]    Findings and plan explained to the patient. Patient discharged home with instructions regarding supportive care, medications, and reasons to return. The importance of close follow-up was reviewed.[BW1.5]   Impression & Plan    Medical Decision Making:[BW1.1]  Contreras Pearson is a 52 year old male who has had elevated LFT's and urine bilirubin at his primary care doctor and was told to come to the ER if he developed jaundice. He currently has scleral icterus, complaining of abdominal pain, and what sounds like gastric reflux. I asked about alcohol use, checked a Tylenol level, there has been no trauma or direct trauma to the liver. I also consulted GI. The patient's workup here has been negative except for the elevated total bilirubin as well as LFT's. It does not appear to be hemolysis and his  hemoglobin has been adequate and there has been no other symptoms to suggest this. There is a gallstone which may be causing intermittent obstruction but no signs of any obstructive process on the CT scan or ultrasound. Pain is under control and felt appropriate by myself, and GI as well, to have the patient follow up with GI and primary as an outpatient. He is doing quite well with no significant pain but he agrees to return of her develops any new or worsening symptoms.[BW1.8]    Diagnosis:[BW1.1]    ICD-10-CM   1. Gallstones K80.20   2. Jaundice R17   3. Elevated LFTs R79.89   4. Abdominal pain, epigastric R10.13[BW1.5]       Disposition:[BW1.1]  Patient is discharged to home.[BW1.5]        IJosé Manuel, am serving as a scribe on 12/19/2017 at 11:05 PM to personally document services performed by Dr. Le based on my observations and the provider's statements to me.      José Manuel Schultz  12/19/2017   [facility] EMERGENCY DEPARTMENT[BW1.1]       Masoud Le MD  12/20/17 0451[CF1.1]       Masoud Le MD  12/20/17 0452  [CF1.2]     Revision History        User Key Date/Time User Provider Type Action    > CF1.2 12/20/2017  4:52 AM Masoud Le MD Physician Addend     CF1.1 12/20/2017  4:51 AM Masoud Le MD Physician Sign     BW1.3 12/20/2017  4:18 AM José Manuel Schultz Scribe Share     BW1.8 12/20/2017  4:15 AM Jose Angel Schultzson Scribe      BW1.5 12/20/2017  4:14 AM Jose Angel Schultzson Scribe Share     BW1.7 12/20/2017  2:14 AM Marion José Manuel Scribe Share     BW1.6 12/20/2017  2:12 AM Marion, Wisner Scribe Share     BW1.2 12/20/2017 12:33 AM Jose Angel Schultzson Scribe Share     BW1.4 12/19/2017 11:44 PM José Manuel Schultz Scribe Share     BW1.1 12/19/2017 11:26 PM José Manuel Schultz            ED Notes by Manda Smith, JODEE at 12/19/2017  8:49 PM     Author:  Manda Smith, JODEE Service:  (none) Author Type:  Registered Nurse    Filed:  12/19/2017   8:51 PM Date of Service:  12/19/2017  8:49 PM Creation Time:  12/19/2017  8:49 PM    Status:  Signed :  Manda Smith RN (Registered Nurse)         Patient comes in for evaluation of jaundice. Patient was seen by PCP (osmar) for epigastric pain, had lab work and was scheduled for an US, was called after lab results and was told his US was changed to a CT and to report to ED if he had any yellowing of skin. ABCs intact.[HE1.1]      Revision History        User Key Date/Time User Provider Type Action    > HE1.1 12/19/2017  8:51 PM Manda Smith, RN Registered Nurse Sign                  Procedure Notes     No notes of this type exist for this encounter.      Progress Notes - Therapies (Notes from 12/19/17 through 12/22/17)     No notes of this type exist for this encounter.

## 2017-12-22 NOTE — H&P
[facility]  Hospitalist Admission Note  Name: Contreras Pearson    MRN: [id number]  YOB: 1965    Age: 52 year old  Date of admission: 12/21/2017  Primary care provider: Princess, Radha Hollis            Assessment and Plan:   Contreras Pearson is a 52 year old male with no apparent medical history who [facility] [place] due to increasing abdominal pain and nausea that occurred earlier today. He was seen in the ED 2 days ago for intermittent abdominal pain, nausea and was found with jaundice, hyperbilirubinemia and small gallstone with no evidence of biliary obstruction. He has no significant pain at that time and was sent home with set-up for outpatient f/u and work-up. He came back today consistent with prior instructions due to worsening abdominal pain.    1. Abdominal pain secondary to biliary pancreatitis  2. Choledocholithiasis and cholelithiasis  3. Jaundice secondary to hyperbilirubinemia from #2  4. Acute hepatitis secondary to #1 and #2    Admit as inpatient. High risk for clinical deterioration.   Keep NPO, aggressive IVF support. Optimize pain control. No prior hx of narcotic use or abuse as per patient. May need IV PCA if pain is not under control.  Continue with IV Invanz as started earlier.  IV pepcid.  GI evaluation with case discussed at the ED earlier. Likely will need ERCP  May need surgery service evaluation as well for gallbladder evaluation down the road if needing cholecystectomy.      Code status: full  Admit to inpatient  Prophylaxis: PCD's  Disposition: home in > 2days          Chief Complaint:   Increasing abdominal pain and nausea       Source of Information:   Patient with good reliability  Discussion with ED physician  Review of E chart records         History of Present Illness:   Contreras Pearson is a 52 year old male with no apparent medical history who [facility] [place] due to increasing abdominal pain and nausea that occurred  earlier today. He was seen in the ED 2 days ago for intermittent abdominal pain, nausea and was found with jaundice, hyperbilirubinemia and small gallstone with no evidence of biliary obstruction. He has no significant pain at that time and was sent home with set-up for outpatient f/u and work-up. He came back today consistent with prior instructions due to worsening abdominal pain.  Work-up now showed elevated lipase, increasing abnormal LFT's and bilirubinemia. He is in significant amount of pain earlier and required IV narcotics. His case was discussed by the ED physician with the GI service and recommending admission, IVF support, NPO and plans for ERCP.  During the time of exam he is still having intermittent pain but no nausea/vomiting. He is conversant but appears anxious due to pain.          Past Medical History:   History reviewed. No pertinent past medical history.          Past Surgical History:   History reviewed. No pertinent surgical history.          Social History:     Social History   Substance Use Topics     Smoking status: Current Every Day Smoker     Packs/day: 1.00     Smokeless tobacco: Never Used     Alcohol use Yes      Comment: occasionally             Family History:   Family history was fully reviewed and non-contributory in this case.         Allergies:     Allergies   Allergen Reactions     Penicillins      Sulfa Drugs              Medications:     Prior to Admission medications    Medication Sig Last Dose Taking? Auth Provider   SUCRALFATE PO Take 1 g by mouth 4 times daily  12/21/2017 at x2 Yes Reported, Patient   PANTOPRAZOLE SODIUM PO Take 40 mg by mouth every morning (before breakfast)  12/21/2017 at Unknown time Yes Reported, Patient             Review of Systems:   A Comprehensive greater than 10 system review of systems was carried out.  Pertinent positives and negatives are noted above.  Otherwise negative for contributory information.           Physical Exam:   Blood pressure  (!) 175/95, pulse 91, temperature 97.8  F (36.6  C), temperature source Oral, resp. rate 18, weight 103.4 kg (228 lb), SpO2 92 %.  Wt Readings from Last 1 Encounters:   12/21/17 103.4 kg (228 lb)     Exam:  GENERAL: No apparent respiratory distress. Awake, alert, and fully oriented.  HEENT: Normocephalic, atraumatic. Extraocular movements intact.  CARDIOVASCULAR: Regular rate and rhythm without murmurs or rubs. No JVD  PULMONARY: Clear to auscultation, no wheezes, crackles  ABDOMINAL: Soft, tender at the epigastric area, non-distended. Bowel sounds normoactive.   EXTREMITIES: No cyanosis or clubbing. No edema.  NEUROLOGICAL: CN 2-12 grossly intact, awake and alert x3, spontaneous and coherent speech. no focal neurological deficits.  DERMATOLOGICAL: No rash, ulcer, ecchymoses, jaundice.  Psych: anxious due to pain  Lymph nodes: no obvious palpable  cervical or axillary lymphadenopathy         Data:   EKG:  NSR at 92 bpm, multipke artifacts    Imaging:  Results for orders placed or performed during the hospital encounter of 12/21/17   US Abdomen Complete    Narrative    COMPLETE ABDOMEN ULTRASOUND  12/21/2017 5:47 PM    HISTORY: Abdominal pain.     FINDINGS: Tiny gallstone present dependently within the gallbladder on  12/20/2017 CT is not sonographically visible. No gallbladder wall  thickening. The common bile duct is mildly dilated measuring 0.8 cm in  diameter. The liver, spleen, kidneys, as well as the visualized  portions of the pancreas, abdominal aorta and IVC appear within normal  limits.      Impression    IMPRESSION:    1. Small gallstone seen one day ago by CT is either not  sonographically visible or may have passed out of the gallbladder.  2. Common bile duct dilatation.         Labs:  No results for input(s): CULT in the last 168 hours.    Recent Labs  Lab 12/21/17  1701 12/19/17  2300   WBC 24.5* 5.8   HGB 16.5 14.5   HCT 48.4 42.3   MCV 87 88    261       Recent Labs  Lab 12/21/17  1701  12/19/17  2300    141   POTASSIUM 3.3* 3.9   CHLORIDE 106 108   CO2 24 28   ANIONGAP 11 5   * 139*   BUN 11 10   CR 0.90 0.77   GFRESTIMATED 89 >90   GFRESTBLACK >90 >90   MARLENE 8.9 8.5   PROTTOTAL 7.7 6.8   ALBUMIN 3.9 3.3*   BILITOTAL 3.4* 2.0*   ALKPHOS 497* 379*   * 283*   * 453*       Recent Labs  Lab 12/21/17  1701 12/19/17  2300   * 139*       Recent Labs  Lab 12/21/17  1701 12/19/17  2300   INR 0.89 0.89       Recent Labs  Lab 12/21/17  1701 12/19/17  2300   LIPASE 41310* 187       Recent Labs  Lab 12/19/17  2324   TROPONIN 0.00       Recent Labs  Lab 12/21/17  1940   COLOR Yellow   APPEARANCE Clear   URINEGLC 50*   URINEBILI Negative   URINEKETONE Negative   SG 1.009   UBLD Negative   URINEPH 5.0   PROTEIN Negative   NITRITE Negative   LEUKEST Negative   RBCU 1   WBCU <1

## 2017-12-22 NOTE — ED NOTES
[facility]  ED Nurse Handoff Report    Contreras Pearson is a 52 year old male   ED Chief complaint: Abdominal Pain  . ED Diagnosis:   Final diagnoses:   Acute biliary pancreatitis, unspecified complication status   Choledocholithiasis     Allergies:   Allergies   Allergen Reactions     Penicillins      Sulfa Drugs        Code Status: Full Code  Activity level - Baseline/Home:  Independent. Activity Level - Current:   Stand with Assist. Lift room needed: No. Bariatric: No   Needed: No   Isolation: No. Infection: Not Applicable.     Vital Signs:   Vitals:    12/21/17 1658 12/21/17 1700 12/21/17 1745 12/21/17 1800   BP:  (!) 175/113 157/89 (!) 163/97   Pulse: 91      Resp: 18      Temp:  97.8  F (36.6  C)     TempSrc:  Oral     SpO2: 96%  99% 97%   Weight: 103.4 kg (228 lb)          Cardiac Rhythm:  ,      Pain level: 0-10 Pain Scale: 6  Patient confused: No. Patient Falls Risk: Yes.   Elimination Status: Has not voided while in ED.   Patient Report - Initial Complaint: Severe abdominal pain upper right quadrant, began after pt ate potato salad and pumpkin pie this afternoon. Focused Assessment: Pain upper right quadrant, nausea, pt diaphoretic upon arrival. Three doses of morphine IV administered with minimal relief. PT was diagnosed with gallstones a few days ago. Wife at bedside, pt alert and oriented x4.   Tests Performed: Labs, ultrasound. Abnormal Results:   Labs Ordered and Resulted from Time of ED Arrival Up to the Time of Departure from the ED   CBC WITH PLATELETS DIFFERENTIAL - Abnormal; Notable for the following:        Result Value    WBC 24.5 (*)     RDW 15.4 (*)     Absolute Neutrophil 17.6 (*)     Absolute Monocytes 1.6 (*)     All other components within normal limits   COMPREHENSIVE METABOLIC PANEL - Abnormal; Notable for the following:     Potassium 3.3 (*)     Glucose 232 (*)     Bilirubin Total 3.4 (*)     Alkaline Phosphatase 497 (*)      (*)      (*)     All  other components within normal limits   LIPASE - Abnormal; Notable for the following:     Lipase 74027 (*)     All other components within normal limits   INR   LACTIC ACID   ROUTINE UA WITH MICROSCOPIC   PERIPHERAL IV CATHETER     US Abdomen Complete   Preliminary Result   IMPRESSION:     1. Small gallstone seen one day ago by CT is either not   sonographically visible or may have passed out of the gallbladder.   2. Common bile duct dilatation.          .   Treatments provided: IV pain medications, Invanz, IV fluids  Family Comments: Wife at bedside  OBS brochure/video discussed/provided to patient:  Yes  ED Medications:   Medications   0.9% sodium chloride BOLUS (0 mLs Intravenous Stopped 12/21/17 1825)     Followed by   0.9% sodium chloride infusion (not administered)   ondansetron (ZOFRAN) injection 4 mg (4 mg Intravenous Given 12/21/17 1708)   0.9% sodium chloride BOLUS (1,000 mLs Intravenous New Bag 12/21/17 1825)   morphine (PF) injection 4 mg (4 mg Intravenous Given 12/21/17 1826)   ertapenem (INVanz) 1 g in 10 mL SWFI for IVP (1 g Intravenous Given 12/21/17 1838)     Drips infusing:  Yes IV fluids  For the majority of the shift, the patient's behavior Green. Interventions performed were N/A.     Severe Sepsis OR Septic Shock Diagnosis Present: No      ED Nurse Name/Phone Number: Aimee Mena,   6:55 PM    RECEIVING UNIT ED HANDOFF REVIEW    Above ED Nurse Handoff Report was reviewed: YES  Reviewed by: Yesica Skelton on December 21, 2017 at 8:25 PM

## 2017-12-22 NOTE — ANESTHESIA PREPROCEDURE EVALUATION
Procedure: Procedure(s):  COMBINED ENDOSCOPIC ULTRASOUND, ESOPHAGOSCOPY, GASTROSCOPY, DUODENOSCOPY (EGD)  ENDOSCOPIC RETROGRADE CHOLANGIOPANCREATOGRAM  Preop diagnosis: .    Allergies   Allergen Reactions     Penicillins      Sulfa Drugs      No past medical history on file.  No past surgical history on file.  Social History   Substance Use Topics     Smoking status: Current Every Day Smoker     Packs/day: 1.00     Smokeless tobacco: Never Used     Alcohol use Yes      Comment: occasionally     Prior to Admission medications    Medication Sig Start Date End Date Taking? Authorizing Provider   SUCRALFATE PO Take 1 g by mouth 4 times daily     Reported, Patient   PANTOPRAZOLE SODIUM PO Take 40 mg by mouth every morning (before breakfast)     Reported, Patient     Current Facility-Administered Medications Ordered in Epic   Medication Dose Route Frequency Last Rate Last Dose     lidocaine 1 % 1 mL  1 mL Other Q1H PRN   1 mL at 12/22/17 1303     lidocaine (LMX4) cream   Topical Q1H PRN         sodium chloride (PF) 0.9% PF flush 3 mL  3 mL Intracatheter Q1H PRN         sodium chloride (PF) 0.9% PF flush 3 mL  3 mL Intracatheter Q8H         May continue current IV fluids if patient has IV fluids infusing.   Does not apply Continuous PRN         sodium chloride (PF) 0.9% PF flush 3 mL  3 mL Intravenous q1 min prn         indomethacin (INDOCIN) 50 MG Suppository 100 mg  100 mg Rectal Once PRN         lactated ringers infusion   Intravenous Continuous 25 mL/hr at 12/22/17 1315       No current [facility]-ordered outpatient prescriptions on file.       - MEDICATION INSTRUCTIONS -       lactated ringers 25 mL/hr at 12/22/17 1315     Wt Readings from Last 1 Encounters:   12/22/17 104 kg (229 lb 3.2 oz)     Temp Readings from Last 1 Encounters:   12/22/17 35.9  C (96.6  F)     BP Readings from Last 6 Encounters:   12/22/17 (!) 168/104   12/22/17 (!) 167/98   12/20/17 138/89     Pulse Readings from Last 4 Encounters:   12/21/17 90    12/19/17 77     Resp Readings from Last 1 Encounters:   12/22/17 16     SpO2 Readings from Last 1 Encounters:   12/22/17 95%     Recent Labs   Lab Test  12/22/17   0409  12/21/17   1701   NA  141  141   POTASSIUM  4.3  3.3*   CHLORIDE  109  106   CO2  26  24   ANIONGAP  6  11   GLC  166*  232*   BUN  13  11   CR  0.80  0.90   MARLENE  8.6  8.9     Recent Labs   Lab Test  12/22/17   0409  12/21/17   1701   AST  251*  357*   ALT  464*  589*   ALKPHOS  395*  497*   BILITOTAL  2.5*  3.4*   LIPASE  7066*  86788*     Recent Labs   Lab Test  12/22/17   0409  12/21/17   1701   WBC  17.2*  24.5*   HGB  14.7  16.5   PLT  303  375     No results for input(s): ABO, RH in the last 33136 hours.  Recent Labs   Lab Test  12/21/17   1701  12/19/17   2300   INR  0.89  0.89   PTT   --   34      No results for input(s): TROPI in the last 37413 hours.  No results for input(s): PH, PCO2, PO2, HCO3 in the last 50625 hours.  No results for input(s): HCG in the last 50132 hours.  Recent Results (from the past 744 hour(s))   Abdomen US, limited (RUQ only)    Narrative    ULTRASOUND ABDOMEN LIMITED RIGHT UPPER QUADRANT  12/20/2017 1:10 AM     HISTORY: Jaundice. Abdominal pain.    COMPARISON: None.    FINDINGS: Normal hepatic echogenicity. No hepatic masses. 0.7 cm  gallstone in the gallbladder. No gallbladder wall thickening or  pericholecystic fluid. No focal tenderness over the gallbladder. No  intra- or extrahepatic biliary dilatation. The common duct measures  0.6 cm in diameter, at the upper limits of normal in caliber. The  right kidney has normal size and echogenicity, measuring 12.3 cm in  length. No right intrarenal collecting system dilatation, calculi or  masses. No free fluid in the upper right hemiabdomen.      Impression    IMPRESSION:  1. Small gallstone in the gallbladder. No evidence of acute  cholecystitis.  2. No biliary dilatation.    ADOLPH TINOCO MD   XR Chest Port 1 View    Narrative    CHEST SINGLE VIEW   12/20/2017  1:22 AM     HISTORY: Jaundice.    COMPARISON: None.    FINDINGS: The lungs are clear. Normal-sized cardiac silhouette.      Impression    IMPRESSION: No evidence of active cardiopulmonary disease.    ADOLPH TINOCO MD   CT Abdomen Pelvis w Contrast    Narrative    CT ABDOMEN AND PELVIS WITH CONTRAST  12/20/2017 3:12 AM     HISTORY: Epigastric abdominal pain. Jaundice. Elevated liver function  tests.    COMPARISON: 12/20/2017 - Ultrasound right upper quadrant.    TECHNIQUE: Following the uneventful administration of 100 mL  Isovue-370 intravenous contrast, helical sections were acquired from  the top of the diaphragm through the pubic symphysis. Coronal  reconstructions were generated. Radiation dose for this scan was  reduced using automated exposure control, adjustment of the mA and/or  kV according to the patient's size, or iterative reconstruction  technique.    FINDINGS:  Abdomen: The liver, spleen, pancreas, adrenal glands and right kidney  are unremarkable. Subcentimeter low attenuation lesion in the inferior  pole of the left kidney, too small to characterize. Tiny 0.5 cm  gallstone in the gallbladder. No intra- or extrahepatic biliary  dilatation. No enlarged lymph nodes or free fluid in the upper  abdomen. Atherosclerotic calcification in the abdominal aorta.    Scan through the lower chest is unremarkable.    Pelvis: The small and large bowel are normal in caliber. A few colonic  diverticula are present without evidence of diverticulitis The  appendix is unremarkable. No bowel wall thickening, pneumatosis or  free intraperitoneal gas. No enlarged lymph nodes or free fluid in the  pelvis.      Impression    IMPRESSION:  1. No cause of acute pain identified in the abdomen or pelvis.  2. No evidence of biliary obstruction.  3. Tiny gallstone in the gallbladder.    ADOLPH TINOCO MD   US Abdomen Complete    Narrative    COMPLETE ABDOMEN ULTRASOUND  12/21/2017 5:47 PM    HISTORY: Abdominal pain.     FINDINGS:  Tiny gallstone present dependently within the gallbladder on  12/20/2017 CT is not sonographically visible. No gallbladder wall  thickening. The common bile duct is mildly dilated measuring 0.8 cm in  diameter. The liver, spleen, kidneys, as well as the visualized  portions of the pancreas, abdominal aorta and IVC appear within normal  limits.      Impression    IMPRESSION:    1. Small gallstone seen one day ago by CT is either not  sonographically visible or may have passed out of the gallbladder.  2. Common bile duct dilatation.      GABI DELGADO MD       RECENT LABS:   ECG:   ECHO:       Anesthesia Evaluation     .             ROS/MED HX    ENT/Pulmonary:     (+)tobacco use, Current use , . .    Neurologic:       Cardiovascular:         METS/Exercise Tolerance:  >4 METS   Hematologic:         Musculoskeletal:         GI/Hepatic:     (+) GERD cholecystitis/cholelithiasis, hepatitis type Other,       Renal/Genitourinary:         Endo:         Psychiatric:         Infectious Disease:         Malignancy:         Other:                     Physical Exam  Normal systems: dental    Airway   Mallampati: I  TM distance: >3 FB  Neck ROM: full    Dental     Cardiovascular   Rhythm and rate: regular and normal      Pulmonary    breath sounds clear to auscultation                    Anesthesia Plan      History & Physical Review  History and physical reviewed and following examination; no interval change.    ASA Status:  2 .    NPO Status:  > 8 hours    Plan for MAC Reason for MAC:  Deep or markedly invasive procedure (G8)  PONV prophylaxis:  Ondansetron (or other 5HT-3) and Dexamethasone or Solumedrol  precedex gtt  Propofol gtt  midazolam      Postoperative Care  Postoperative pain management:  IV analgesics.      Consents  Anesthetic plan, risks, benefits and alternatives discussed with:  Patient..                          .

## 2017-12-22 NOTE — ANESTHESIA POSTPROCEDURE EVALUATION
Patient: Contreras Pearson    Procedure(s):  ENDOSCOPIC ULTRASOUND - Wound Class: II-Clean Contaminated    Diagnosis:.  Diagnosis Additional Information: No value filed.    Anesthesia Type:  MAC    Note:  Anesthesia Post Evaluation    Patient location during evaluation: PACU  Patient participation: Able to fully participate in evaluation  Level of consciousness: sleepy but conscious and responsive to verbal stimuli  Pain management: adequate  Airway patency: patent  Cardiovascular status: acceptable and hemodynamically stable  Respiratory status: acceptable and unassisted  Hydration status: acceptable  PONV: none     Anesthetic complications: None          Last vitals:  Vitals:    12/22/17 1640 12/22/17 1650 12/22/17 1700   BP: 144/87 151/88 (!) 163/99   Resp: 18 16 11   Temp:      SpO2: 96% 96% 97%         Electronically Signed By: Jonathan Lucio MD  December 22, 2017  5:10 PM

## 2017-12-22 NOTE — IP AVS SNAPSHOT
MRN:[id number]                      After Visit Summary   12/22/2017    Contreras Pearson    MRN: [id number]           Thank you!     Thank you for choosing [facility] for your care. Our goal is always to provide you with excellent care. Hearing back from our patients is one way we can continue to improve our services. Please take a few minutes to complete the written survey that you may receive in the mail after you visit with us. Thank you!        Patient Information     Date Of Birth          1965        About your hospital stay     You were admitted on:  December 22, 2017 You last received care in the:  [facility] PACU    You were discharged on:  December 22, 2017       Who to Call     For medical emergencies, please call 911.  For non-urgent questions about your medical care, please call your primary care provider or clinic, [phone]  For questions related to your surgery, please call your surgery clinic        Attending Provider     Provider Raeann Wahl MD Gastroenterology       Primary Care Provider Office Phone # Fax #    [facility] [phone]      After Care Instructions     Discharge Instructions       No driving or operating machinery until the day after procedure.            Discharge Instructions       Recommend that a responsible adult remain with the patient at home for 24 hours post discharge.            Discharge Instructions       Start with clear liquids, sips of water 1 hour after procedure. If no abdominal pain and gag reflex has returned, advance as tolerated to pre-procedure diet.              Discharge Instructions       Restart home medications.            Discharge Instructions       Check with your Provider when to start anticoagulant medication.            Discharge Instructions       No ALCOHOL 24 hours post procedure.            Discharge Instructions       No driving or operating machinery until the  "day after procedure.            Discharge Instructions       Recommend that a responsible adult remain with the patient at home for 24 hours post discharge.            Discharge Instructions       Start with clear liquids, sips of water 1 hour after procedure. If no abdominal pain and gag reflex has returned, advance as tolerated to pre-procedure diet.              Discharge Instructions       Restart home medications.            Discharge Instructions       Check with your Provider when to start anticoagulant medication.            Discharge Instructions       No ALCOHOL 24 hours post procedure.                  Pending Results     No orders found from 2017 to 2017.            Admission Information     Date & Time Provider Department Dept. Phone    2017 Raeann Brito MD [facility] PACU [phone]      Your Vitals Were     Blood Pressure Temperature Respirations Pulse Oximetry          156/93 99.2  F (37.3  C) (Temporal) 30 96%        MyChart Information     [facility] lets you send messages to your doctor, view your test results, renew your prescriptions, schedule appointments and more. To sign up, go to [email or web address] . Click on \"Log in\" on the left side of the screen, which will take you to the Welcome page. Then click on \"Sign up Now\" on the right side of the page.     You will be asked to enter the access code listed below, as well as some personal information. Please follow the directions to create your username and password.     Your access code is: [email or web address]  Expires: 3/20/2018  4:10 AM     Your access code will  in 90 days. If you need help or a new code, please call your [facility] clinic or [phone].        Care EveryWhere ID     This is your Care EveryWhere ID. This could be used by other organizations to access your [facility] medical records  [id number]        Equal Access to Services     TIFFANIE MUÑIZ AH: anna Guthrie " jaylyn pierrefransiscojimmie jayelverbiancamichelle guillehenrique dumontcheryl ah. So [facility] [phone].    ATENCIÓN: Si traci montague, tiene a anne disposición servicios gratuitos de asistencia lingüística. Llame al [phone].    We comply with applicable federal civil rights laws and [place] laws. We do not discriminate on the basis of race, color, national origin, age, disability, sex, sexual orientation, or gender identity.               Review of your medicines      CONTINUE these medicines which have NOT CHANGED        Dose / Directions    PANTOPRAZOLE SODIUM PO        Dose:  40 mg   Take 40 mg by mouth every morning (before breakfast)   Refills:  0       SUCRALFATE PO        Dose:  1 g   Take 1 g by mouth 4 times daily   Refills:  0                Protect others around you: Learn how to safely use, store and throw away your medicines at www.disposemymeds.org.             Medication List: This is a list of all your medications and when to take them. Check marks below indicate your daily home schedule. Keep this list as a reference.      Medications           Morning Afternoon Evening Bedtime As Needed    PANTOPRAZOLE SODIUM PO   Take 40 mg by mouth every morning (before breakfast)                                SUCRALFATE PO   Take 1 g by mouth 4 times daily

## 2017-12-22 NOTE — LETTER
[address] , [address]  [place] [address]  [phone]  F: [phone]    [email or web address]  [email or web address]  [email or web address]             2017      RE: Contreras Pearson  :  1965      This is to certify that Contreras Pearson has been under my care for surgery on 2017.      Patient is able to return to work without restrictions as of 1/15/2018.    This date is subject to change due to the healing process.           Sincerely,            Amber Nunez MD

## 2017-12-22 NOTE — PLAN OF CARE
Pt alert and oriented, up with standby assist to the bathroom. Pt rating his pain 8-10, receiving IV dilaudid every 2 hours. Admitting pager text paged with request for additional pain medications X2, no new orders.  Pt NPO, unable to give oral pain medications. Lipase trending down. Potassium replaced to normal this am. Pt did trigger sepsis protocol, lactic 1.3 this am.

## 2017-12-22 NOTE — PROVIDER NOTIFICATION
Pt rating his pain 7 out of 10, moaning in bed. Pt had IV dilaudid last at 2300, not available again until 0100. Admitting pager text paged with request for something else for pain. Heating pad applied for comfort. Will monitor and await orders.

## 2017-12-22 NOTE — PROVIDER NOTIFICATION
MD JOSIANE castañeda.    JOSIANE PINA scheduled ERCP for 1430 at [facility]. Pt will need to be at [facility] at 1230.

## 2017-12-22 NOTE — PROVIDER NOTIFICATION
Pt again rating his pain 8 out of 10. Pt last had IV dilaudid at 0411, too early for more. Admitting pager text paged for more pain medication. Will await orders.

## 2017-12-22 NOTE — OR NURSING
Updated RN charge on 6th floor at [facility] that patient was given 50 mcg of Fentanyl since report was called an hour ago to Helen Sánchez, receiving RN.  Also informed that ambulance is here and patient is leaving pacu for [facility] now.  Wife also updated with phone message that this transfer is occurring now.  Patient is alert, drinking sips of water, denying nausea and pain under control at rest.  When hiccups pain is a 5.

## 2017-12-22 NOTE — PHARMACY-ADMISSION MEDICATION HISTORY
Admission medication history interview status for this patient is complete. See [facility] admission navigator for allergy information, prior to admission medications and immunization status.     Medication history interview source(s):Patient  Medication history resources (including written lists, pill bottles, clinic record):None  Primary pharmacy:    Changes made to PTA medication list:  Added:  both  Deleted:   Changed:     Actions taken by pharmacist (provider contacted, etc):None     Additional medication history information:None    Medication reconciliation/reorder completed by provider prior to medication history? No    Do you take OTC medications (eg tylenol, ibuprofen, fish oil, eye/ear drops, etc)? n(Y/N)    For patients on insulin therapy: n (Y/N)  Lantus/levemir/NPH/Mix 70/30 dose:   (Y/N) (see Med list for doses)   Sliding scale Novolog Y/N  If Yes, do you have a baseline novolog pre-meal dose:  units with meals  Patients eat three meals a day:   Y/N    How many episodes of hypoglycemia do you have per week: _______  How many missed doses do you have per week: ______  How many times do you check your blood glucose per day: _______   Any Barriers to therapy - Be specific :  cost of medications, comfortable with giving injections (if applicable), comfortable and confident with current diabetes regimen: Y/N ______________      Prior to Admission medications    Medication Sig Last Dose Taking? Auth Provider   SUCRALFATE PO Take 1 g by mouth 4 times daily  12/21/2017 at x2 Yes Reported, Patient   PANTOPRAZOLE SODIUM PO Take 40 mg by mouth every morning (before breakfast)  12/21/2017 at Unknown time Yes Reported, Patient

## 2017-12-22 NOTE — IP AVS SNAPSHOT
MRN:[id number]                      After Visit Summary   12/22/2017    Contreras Pearson    MRN: [id number]           Thank you!     Thank you for choosing [facility] for your care. Our goal is always to provide you with excellent care. Hearing back from our patients is one way we can continue to improve our services. Please take a few minutes to complete the written survey that you may receive in the mail after you visit. If you would like to speak to someone directly about your visit please contact Patient Relations at [phone]. Thank you!          Patient Information     Date Of Birth          1965        Designated Caregiver       Most Recent Value    Caregiver    Will someone help with your care after discharge? yes    Name of designated caregiver wife - see chart    Phone number of caregiver see chart    Caregiver address see chart      About your hospital stay     You were admitted on:  December 22, 2017 You last received care in the:  [place] [address] [facility]    You were discharged on:  December 31, 2017        Reason for your hospital stay       You were admitted for gall stone pancreatitis and did undergo cholecystectomy during your stay.  You should continue to push fluids and maintain a low fat diet at home.                  Who to Call     For medical emergencies, please call 911.  For non-urgent questions about your medical care, please call your primary care provider or clinic, [phone]  For questions related to your surgery, please call your surgery clinic        Attending Provider     Provider Specialty    Kelvin Bangura MD Internal Medicine    Roni Duvall MD Internal Medicine       Primary Care Provider Office Phone # Fax #    [facility] [phone]      After Care Instructions     Activity       Your activity upon discharge: gradually resume usual activity as tolerated but do not lift more than 15  "pounds for the next week or operate machinery or drive if you are still requiring narcotic pain medications.            Diet       Follow this diet upon discharge: Orders Placed This Encounter      Low Fat Diet                  Follow-up Appointments     Follow-up and recommended labs and tests        Follow up with General Surgery as directed for hospital follow up.  Do not return to work for at least one week or as directed by your Surgeon.                  Pending Results     No orders found from 2017 to 2017.            Statement of Approval     Ordered          17 1457  I have reviewed and agree with all the recommendations and orders detailed in this document.  EFFECTIVE NOW     Approved and electronically signed by:  Marcin Castellanos MD             Admission Information     Date & Time Provider Department Dept. Phone    2017 Roni Duvall MD [place] [address] [facility] [phone]      Your Vitals Were     Blood Pressure Pulse Temperature Respirations Height Weight    135/83 (BP Location: Right arm) 72 97.5  F (36.4  C) (Oral) 22 1.88 m (6' 2\") 99.1 kg (218 lb 8 oz)    Pulse Oximetry BMI (Body Mass Index)                98% 28.05 kg/m2          [employer] Information     [employer] lets you send messages to your doctor, view your test results, renew your prescriptions, schedule appointments and more. To sign up, go to [email or web address] . Click on \"Log in\" on the left side of the screen, which will take you to the Welcome page. Then click on \"Sign up Now\" on the right side of the page.     You will be asked to enter the access code listed below, as well as some personal information. Please follow the directions to create your username and password.     Your access code is: [email or web address]  Expires: 3/20/2018  4:10 AM     Your access code will  in 90 days. If you need help or a new code, please call your [place] clinic or [phone].        Care EveryWhere " ID     This is your Care EveryWhere ID. This could be used by other organizations to access your [facility] medical records  [id number]        Equal Access to Services     TIFFANIE MUÑIZ : Jasmeet Lea, anna pierre, germaindeepali trimblefaisal pascual, mami guillein hayaacheryl richardsonadrian rodriguez laOtonielantelmo rodriguesRenate So [facility] [phone].    ATENCIÓN: Si habla español, tiene a anne disposición servicios gratuitos de asistencia lingüística. Llame al [phone].    We comply with applicable federal civil rights laws and [place] laws. We do not discriminate on the basis of race, color, national origin, age, disability, sex, sexual orientation, or gender identity.               Review of your medicines      START taking        Dose / Directions    acetaminophen 325 MG tablet   Commonly known as:  TYLENOL        Dose:  650 mg   Take 2 tablets (650 mg) by mouth every 4 hours as needed for other (surgical pain)   Quantity:  100 tablet   Refills:  0       oxyCODONE IR 5 MG tablet   Commonly known as:  ROXICODONE        Dose:  5 mg   Take 1 tablet (5 mg) by mouth every 4 hours as needed for moderate to severe pain   Quantity:  18 tablet   Refills:  0         CONTINUE these medicines which have NOT CHANGED        Dose / Directions    PANTOPRAZOLE SODIUM PO        Dose:  40 mg   Take 40 mg by mouth every morning (before breakfast)   Refills:  0       SUCRALFATE PO        Dose:  1 g   Take 1 g by mouth 4 times daily   Refills:  0            Where to get your medicines      Some of these will need a paper prescription and others can be bought over the counter. Ask your nurse if you have questions.     Bring a paper prescription for each of these medications     oxyCODONE IR 5 MG tablet       You don't need a prescription for these medications     acetaminophen 325 MG tablet                Protect others around you: Learn how to safely use, store and throw away your medicines at www.disposemymeds.org.             Medication List: This is a  list of all your medications and when to take them. Check marks below indicate your daily home schedule. Keep this list as a reference.      Medications           Morning Afternoon Evening Bedtime As Needed    acetaminophen 325 MG tablet   Commonly known as:  TYLENOL   Take 2 tablets (650 mg) by mouth every 4 hours as needed for other (surgical pain)   Last time this was given:  650 mg on 12/31/2017  2:36 PM                                oxyCODONE IR 5 MG tablet   Commonly known as:  ROXICODONE   Take 1 tablet (5 mg) by mouth every 4 hours as needed for moderate to severe pain   Last time this was given:  10 mg on 12/29/2017  8:46 AM                                PANTOPRAZOLE SODIUM PO   Take 40 mg by mouth every morning (before breakfast)   Last time this was given:  40 mg on 12/31/2017  9:23 AM                                SUCRALFATE PO   Take 1 g by mouth 4 times daily                                          More Information        Discharge Instructions for Acute Pancreatitis  You have been diagnosed with acute pancreatitis. Your pancreas is inflamed or swollen. The pancreas is an organ that makes digestive juices and hormones. Gallstones are a common cause of pancreatitis. These hard stones form in the gallbladder. The gallbladder shares a tube with the pancreas into the small intestine. If gallstones block this tube, fluid can t leave the pancreas. The fluid backs up and causes redness and swelling (inflammation). There are other causes of pancreatitis. Make sure you understand the cause of your pancreatitis. Then you can try to stop it from happening again.  Immediate home care    Find someone to drive you to appointments. Acute pancreatitis is a serious condition, and you should never drive if you are experiencing symptoms.     Take your medicines exactly as directed. Don t skip doses.    Eat a low-fat diet. Ask your provider for menus and other diet information.    Learn to take your own pulse. Keep a  record of your results. Ask your provider which readings mean that you need medical attention.  Ongoing care    Tell your provider about any medicines you are taking. Some medicines can cause this condition.    Before starting any new medicine, ask your provider if it will harm your pancreas. This includes any new over-the-counter medicines, vitamins, or herbal supplements.      Tell your provider if you lose weight without dieting.    Be aware of symptoms that may mean your pancreatitis has come back. These symptoms include belly pain, nausea and vomiting, and fever.    Keep all follow-up appointments with your provider. Problems can often show up later.  Follow-up  Follow up with your healthcare provider, or as advised.  When to call your provider  Call your healthcare provider right away if you have any of the following:    Fever of 100.4 F (38.0 C) or higher, or as advised by your provider    Severe pain from your upper belly to your back    Nausea and vomiting    Feely dizzy or lightheaded    Yellowing of your skin or eyes (jaundice)    Bruises on your belly or back    Belly swelling and tenderness    Rapid pulse    Shallow, fast breathing   Date Last Reviewed: 8/1/2016 2000-2017 [employer]. [address] [place] [address]. All rights reserved. This information is not intended as a substitute for professional medical care. Always follow your healthcare professional's instructions.                Low-Fat Diet    A low-fat diet will help you lose weight. It also can lower cholesterol and prevent symptoms of gallbladder disease. The average American diet contains up to 50% fat. This means that 50% of all calories come from fat (about 80 grams to 100 grams of fat per day). Choosing normal portions of foods from the list below can help lower your fat intake. Experts recommend that only 20% to 35% of your daily calories come from fat. The remaining 65% to 80% of calories will come from  protein and carbohydrates. This is much healthier for you.  Breads  Ok: Whole-wheat or rye bread, cortes or soda crackers, janina toast, plain rolls, bagels, English muffins  Avoid: Rolls and breads containing whole milk or egg; waffles, pancakes, biscuits, corn bread; cheese crackers, other flavored crackers, pastries, doughnuts  Cereals  Ok: Oatmeal, whole-wheat, bran, multigrain, rice  Avoid: Granola or other cereals that have oil, coconut, or more than 2 grams of fat per serving  Cheese and eggs  Ok: Cheeses labeled low-fat; 3 whole eggs per week; egg whites and egg substitutes as desired  Avoid: All other cheeses  Desserts  Ok: Gelatin, slushy, otilio food cake, meringues, nonfat yogurt, and puddings or sherbet made with nonfat milk  Avoid: Any other store-bought desserts, or desserts that have fat, whole milk, cream, chocolate, and coconut  Drinks  Ok: Nonfat milk, coffee, tea, fizzy (carbonated) drinks  Avoid: Whole and reduced-fat milk, evaporated and condensed milk, hot chocolate mixes, milk shakes, malts, eggnog  Fats  Ok: You may have up to 3 teaspoons of fat daily. This can be butter, margarine, mayonnaise, or healthy oils (canola or olive)  Avoid: Cream, nondairy creams, cream cheese, gravies, and cream sauces  Fruits  Ok: All fruits made without fat  Avoid: Coconut, olives  Meats, poultry, fish  Ok: Limit meat to 6 ounces daily (broiled, roasted, baked, grilled, or boiled). Buy lean cuts, and trim off the fat. Try beef, fish, lamb, pork, and canned fish packed in water; also chicken and turkey with the skin removed.  Avoid: Fried meats, fish, or poultry; fried eggs, and fish canned in oils; fatty meats such as lemos, sausage, corned beef, hot dogs, and lunch meats; meats with gravies and sauces  Potatoes, beans, pasta  Ok: Dried beans, split peas, lentils, potatoes, rice, pasta made without added fat  Avoid: French fries, potato chips, potatoes prepared with butter, refried beans  Soups  Ok: Clear broth  soups without fat and with allowed vegetables  Avoid: Cream-based soups  Vegetables  Ok: Fresh, frozen, canned or dried vegetables, all made without added fat  Avoid: Fried vegetables and those prepared with butter, cream, sauces  Miscellaneous  Ok: Salt, sugar, jelly, hard candy, marshmallows, honey, syrup, spices and herbs, mustard, ketchup, lemon, and vinegar. Try to limit sweets and added sugars.  Avoid: Chocolate, nuts, coconut, and cream candies; sunflower, sesame, and other seeds; fried foods; cream sauces and gravies; pizza  Date Last Reviewed: 8/1/2016 2000-2017 [employer]. [address] [place] [address]. All rights reserved. This information is not intended as a substitute for professional medical care. Always follow your healthcare professional's instructions.                Low-Fat Cooking Tips  To eat less fat, you may need to learn some new ways to cook. But that doesn't mean you have to eat bland, boring food. And it doesn't mean cooking needs to take any more time. Here are some tips for cooking and seasoning foods with less fat.     Broil fish instead of frying it. And sprinkle on herbs to add flavor.    Try New Cooking Methods    Broil, roast, bake, steam, or microwave fish, chicken, turkey, and other meats.    Remove skin from chicken and turkey and trim extra fat from meat before cooking.    Sprinkle herbs on meat, chicken, and fish, and in soups.    Cook in broth instead of fat.    Use nonstick cooking sprays or nonstick pans.    Steam or microwave vegetables without adding fat. Serve with herbs, lemon juice, vinegar, or fat-free butter-flavored powder.    To flavor beans and rice, add chopped onions, garlic, and peppers.    Chill soups and stews. Before reheating and serving, skim off the fat.    When you add fat, use canola or olive oil instead of butter or lard.  Lighten Up Your Recipes    In soups and sauces: Replace whole milk or cream with low-fat milk,  evaporated fat-free milk, or nonfat dry milk.    In puddings and other desserts: Replace whole milk or cream with low-fat milk or fat-free condensed milk.    To make dips and toppings: Use low-fat or nonfat cottage cheese or sour cream.    To make salad dressings: Use nonfat yogurt or low-fat buttermilk.    In place of 1 whole egg in recipes: Use 2 egg whites or 1/4 cup egg substitute.    In place of regular cheese: Use fat-free or reduced-fat cheese.  Date Last Reviewed: 6/1/2017 2000-2017 [employer]. [address] [place] [address]. All rights reserved. This information is not intended as a substitute for professional medical care. Always follow your healthcare professional's instructions.              Eating a Low-fat Diet  Your doctor has ordered a low-fat diet for you. Do not worry about the type of fat, only the total amount of fat.  You should eat fat-free milk products and lean meats. Avoid pastries and desserts. Limit margarine, butter, salad dressings and other added fats.  The guidelines below will help you keep your diet low in fat.    Avoid cooking with fat and deep-fat frying. Food will soak up some of the cooking fat. Use non-stick pans or coat your pans with non-stick spray.    Do not eat deep-fat fried foods.    Trim all the fat from meats and remove the skin from chicken and turkey.    Meats may be boiled, poached, steamed, broiled, roasted, stewed, grilled or cooked in a microwave. Place meats for roasting on a rack so the fat can drain off while cooking.    Try dishes without meat such as chili with beans, lentil soup and pasta with meatless tomato sauce.    Look for low-fat or no-fat milk products.    Cool homemade soups and stews in the fridge. Skim the fat from the top before serving.    If you feel hungry, snack on low-fat yogurt, fresh fruit, raw vegetables and low-fat dip.    Avoid avocado, peanuts and other nuts. These are high in fat.    Season foods with  lemon, herbs, spices or imitation butter.  Also, it is important to know the amount of fat in food products. You can read nutrition labels to find the fat content. See the back of this sheet for directions on how to read nutrition labels.  How to read nutrition labels   You will find a nutrition label on most food items you buy. This is a tool to help you make good food choices.  First, look at the serving size. This tells you how large one serving will be.   Then, look at the servings per container to check how many servings are in each package. Even a small package can have more than one serving. If you eat two servings, you will get twice the number of fat grams listed on the label.   Total fat is the total amount of saturated, trans fat, polyunsaturated and monounsaturated fats in one serving. The type of fat is not important, just the total amount of fat you eat each day.     For informational purposes only. Not to replace the advice of your health care provider.   Copyright   2006 [facility]. All rights reserved. [employer] 052986 - REV 09/15.

## 2017-12-22 NOTE — PROGRESS NOTES
Report called to JODEE Saab at [facility] pre-op department. Dr. Fields aware of wife transporting pt to [facility], plan to arrive at 1230 for ERCP at 1430. Will remove IV prior to pt leaving this hospital.     IV removed. Belongings packed up in car just in case. Pt brought to car in wheelchair. Spouse driving pt to [facility], left at noon. Plan for pt to come back to [facility] after ERCP per GI (GUI [place]).

## 2017-12-23 ENCOUNTER — ANESTHESIA EVENT (OUTPATIENT)
Dept: SURGERY | Facility: CLINIC | Age: 52
End: 2017-12-23
Payer: COMMERCIAL

## 2017-12-23 ENCOUNTER — ANESTHESIA (OUTPATIENT)
Dept: SURGERY | Facility: CLINIC | Age: 52
End: 2017-12-23
Payer: COMMERCIAL

## 2017-12-23 ENCOUNTER — APPOINTMENT (OUTPATIENT)
Dept: GENERAL RADIOLOGY | Facility: CLINIC | Age: 52
End: 2017-12-23
Attending: INTERNAL MEDICINE
Payer: COMMERCIAL

## 2017-12-23 PROBLEM — K85.90 PANCREATITIS: Status: ACTIVE | Noted: 2017-12-23

## 2017-12-23 PROBLEM — K85.10 GALLSTONE PANCREATITIS: Status: ACTIVE | Noted: 2017-12-23

## 2017-12-23 LAB
ALBUMIN SERPL-MCNC: 2.7 G/DL (ref 3.4–5)
ALP SERPL-CCNC: 237 U/L (ref 40–150)
ALT SERPL W P-5'-P-CCNC: 289 U/L (ref 0–70)
ANION GAP SERPL CALCULATED.3IONS-SCNC: 6 MMOL/L (ref 3–14)
AST SERPL W P-5'-P-CCNC: 143 U/L (ref 0–45)
BILIRUB SERPL-MCNC: 2.1 MG/DL (ref 0.2–1.3)
BUN SERPL-MCNC: 12 MG/DL (ref 7–30)
CALCIUM SERPL-MCNC: 8 MG/DL (ref 8.5–10.1)
CHLORIDE SERPL-SCNC: 107 MMOL/L (ref 94–109)
CO2 SERPL-SCNC: 25 MMOL/L (ref 20–32)
CREAT SERPL-MCNC: 0.76 MG/DL (ref 0.66–1.25)
ERYTHROCYTE [DISTWIDTH] IN BLOOD BY AUTOMATED COUNT: 15.5 % (ref 10–15)
GFR SERPL CREATININE-BSD FRML MDRD: >90 ML/MIN/1.7M2
GLUCOSE SERPL-MCNC: 99 MG/DL (ref 70–99)
HCT VFR BLD AUTO: 39.3 % (ref 40–53)
HGB BLD-MCNC: 13.2 G/DL (ref 13.3–17.7)
LACTATE BLD-SCNC: 1.1 MMOL/L (ref 0.7–2)
LIPASE SERPL-CCNC: 1948 U/L (ref 73–393)
MCH RBC QN AUTO: 29.5 PG (ref 26.5–33)
MCHC RBC AUTO-ENTMCNC: 33.6 G/DL (ref 31.5–36.5)
MCV RBC AUTO: 88 FL (ref 78–100)
PLATELET # BLD AUTO: 214 10E9/L (ref 150–450)
POTASSIUM SERPL-SCNC: 3.6 MMOL/L (ref 3.4–5.3)
PROT SERPL-MCNC: 5.7 G/DL (ref 6.8–8.8)
RBC # BLD AUTO: 4.48 10E12/L (ref 4.4–5.9)
SODIUM SERPL-SCNC: 138 MMOL/L (ref 133–144)
WBC # BLD AUTO: 16.9 10E9/L (ref 4–11)

## 2017-12-23 PROCEDURE — 47563 LAPARO CHOLECYSTECTOMY/GRAPH: CPT | Performed by: SURGERY

## 2017-12-23 PROCEDURE — 47563 LAPARO CHOLECYSTECTOMY/GRAPH: CPT | Mod: AS | Performed by: PHYSICIAN ASSISTANT

## 2017-12-23 PROCEDURE — 0FT44ZZ RESECTION OF GALLBLADDER, PERCUTANEOUS ENDOSCOPIC APPROACH: ICD-10-PCS | Performed by: SURGERY

## 2017-12-23 PROCEDURE — 99233 SBSQ HOSP IP/OBS HIGH 50: CPT | Performed by: INTERNAL MEDICINE

## 2017-12-23 PROCEDURE — BF131ZZ FLUOROSCOPY OF GALLBLADDER AND BILE DUCTS USING LOW OSMOLAR CONTRAST: ICD-10-PCS | Performed by: SURGERY

## 2017-12-23 PROCEDURE — 99254 IP/OBS CNSLTJ NEW/EST MOD 60: CPT | Performed by: SURGERY

## 2017-12-23 RX ORDER — NEOSTIGMINE METHYLSULFATE 1 MG/ML
VIAL (ML) INJECTION PRN
Status: DISCONTINUED | OUTPATIENT
Start: 2017-12-23 | End: 2017-12-23

## 2017-12-23 RX ORDER — HYDROMORPHONE HYDROCHLORIDE 1 MG/ML
.3-.5 INJECTION, SOLUTION INTRAMUSCULAR; INTRAVENOUS; SUBCUTANEOUS
Status: DISCONTINUED | OUTPATIENT
Start: 2017-12-23 | End: 2017-12-31

## 2017-12-23 RX ORDER — MEPERIDINE HYDROCHLORIDE 25 MG/ML
12.5 INJECTION INTRAMUSCULAR; INTRAVENOUS; SUBCUTANEOUS
Status: DISCONTINUED | OUTPATIENT
Start: 2017-12-23 | End: 2017-12-31

## 2017-12-23 RX ORDER — FENTANYL CITRATE 50 UG/ML
25-50 INJECTION, SOLUTION INTRAMUSCULAR; INTRAVENOUS
Status: DISCONTINUED | OUTPATIENT
Start: 2017-12-23 | End: 2017-12-23 | Stop reason: HOSPADM

## 2017-12-23 RX ORDER — PROPOFOL 10 MG/ML
INJECTION, EMULSION INTRAVENOUS PRN
Status: DISCONTINUED | OUTPATIENT
Start: 2017-12-23 | End: 2017-12-23

## 2017-12-23 RX ORDER — ONDANSETRON 4 MG/1
4 TABLET, ORALLY DISINTEGRATING ORAL EVERY 30 MIN PRN
Status: DISCONTINUED | OUTPATIENT
Start: 2017-12-23 | End: 2017-12-31

## 2017-12-23 RX ORDER — ACETAMINOPHEN 650 MG/1
650 SUPPOSITORY RECTAL EVERY 4 HOURS PRN
Status: DISCONTINUED | OUTPATIENT
Start: 2017-12-23 | End: 2017-12-23 | Stop reason: ALTCHOICE

## 2017-12-23 RX ORDER — NALOXONE HYDROCHLORIDE 0.4 MG/ML
.1-.4 INJECTION, SOLUTION INTRAMUSCULAR; INTRAVENOUS; SUBCUTANEOUS
Status: ACTIVE | OUTPATIENT
Start: 2017-12-23 | End: 2017-12-24

## 2017-12-23 RX ORDER — PROCHLORPERAZINE MALEATE 5 MG
10 TABLET ORAL EVERY 6 HOURS PRN
Status: DISCONTINUED | OUTPATIENT
Start: 2017-12-23 | End: 2017-12-23 | Stop reason: ALTCHOICE

## 2017-12-23 RX ORDER — OXYCODONE HYDROCHLORIDE 5 MG/1
5-10 TABLET ORAL
Status: DISCONTINUED | OUTPATIENT
Start: 2017-12-23 | End: 2017-12-23 | Stop reason: ALTCHOICE

## 2017-12-23 RX ORDER — AMOXICILLIN 250 MG
1-2 CAPSULE ORAL 2 TIMES DAILY
Status: DISCONTINUED | OUTPATIENT
Start: 2017-12-23 | End: 2017-12-30

## 2017-12-23 RX ORDER — HYDROMORPHONE HYDROCHLORIDE 1 MG/ML
.3-.5 INJECTION, SOLUTION INTRAMUSCULAR; INTRAVENOUS; SUBCUTANEOUS EVERY 10 MIN PRN
Status: DISCONTINUED | OUTPATIENT
Start: 2017-12-23 | End: 2017-12-31

## 2017-12-23 RX ORDER — ACETAMINOPHEN 325 MG/1
650 TABLET ORAL EVERY 4 HOURS PRN
Status: DISCONTINUED | OUTPATIENT
Start: 2017-12-23 | End: 2017-12-23 | Stop reason: ALTCHOICE

## 2017-12-23 RX ORDER — SODIUM CHLORIDE, SODIUM LACTATE, POTASSIUM CHLORIDE, CALCIUM CHLORIDE 600; 310; 30; 20 MG/100ML; MG/100ML; MG/100ML; MG/100ML
INJECTION, SOLUTION INTRAVENOUS CONTINUOUS
Status: DISCONTINUED | OUTPATIENT
Start: 2017-12-23 | End: 2017-12-28

## 2017-12-23 RX ORDER — ONDANSETRON 2 MG/ML
4 INJECTION INTRAMUSCULAR; INTRAVENOUS EVERY 30 MIN PRN
Status: DISCONTINUED | OUTPATIENT
Start: 2017-12-23 | End: 2017-12-31

## 2017-12-23 RX ORDER — DEXAMETHASONE SODIUM PHOSPHATE 4 MG/ML
INJECTION, SOLUTION INTRA-ARTICULAR; INTRALESIONAL; INTRAMUSCULAR; INTRAVENOUS; SOFT TISSUE PRN
Status: DISCONTINUED | OUTPATIENT
Start: 2017-12-23 | End: 2017-12-23

## 2017-12-23 RX ORDER — NALOXONE HYDROCHLORIDE 0.4 MG/ML
.1-.4 INJECTION, SOLUTION INTRAMUSCULAR; INTRAVENOUS; SUBCUTANEOUS
Status: DISCONTINUED | OUTPATIENT
Start: 2017-12-23 | End: 2017-12-31 | Stop reason: HOSPADM

## 2017-12-23 RX ORDER — ONDANSETRON 2 MG/ML
4 INJECTION INTRAMUSCULAR; INTRAVENOUS EVERY 6 HOURS PRN
Status: DISCONTINUED | OUTPATIENT
Start: 2017-12-23 | End: 2017-12-23 | Stop reason: ALTCHOICE

## 2017-12-23 RX ORDER — SODIUM CHLORIDE 9 MG/ML
INJECTION, SOLUTION INTRAVENOUS CONTINUOUS
Status: DISCONTINUED | OUTPATIENT
Start: 2017-12-23 | End: 2017-12-28

## 2017-12-23 RX ORDER — ACETAMINOPHEN 325 MG/1
650 TABLET ORAL EVERY 4 HOURS PRN
Status: DISCONTINUED | OUTPATIENT
Start: 2017-12-26 | End: 2017-12-31 | Stop reason: HOSPADM

## 2017-12-23 RX ORDER — METOPROLOL TARTRATE 1 MG/ML
1-2 INJECTION, SOLUTION INTRAVENOUS EVERY 5 MIN PRN
Status: DISCONTINUED | OUTPATIENT
Start: 2017-12-23 | End: 2017-12-31

## 2017-12-23 RX ORDER — FENTANYL CITRATE 50 UG/ML
INJECTION, SOLUTION INTRAMUSCULAR; INTRAVENOUS PRN
Status: DISCONTINUED | OUTPATIENT
Start: 2017-12-23 | End: 2017-12-23

## 2017-12-23 RX ORDER — DIPHENHYDRAMINE HCL 25 MG
25 CAPSULE ORAL EVERY 6 HOURS PRN
Status: DISCONTINUED | OUTPATIENT
Start: 2017-12-23 | End: 2017-12-31 | Stop reason: HOSPADM

## 2017-12-23 RX ORDER — ACETAMINOPHEN 325 MG/1
975 TABLET ORAL EVERY 8 HOURS
Status: COMPLETED | OUTPATIENT
Start: 2017-12-23 | End: 2017-12-26

## 2017-12-23 RX ORDER — BUPIVACAINE HYDROCHLORIDE 2.5 MG/ML
INJECTION, SOLUTION EPIDURAL; INFILTRATION; INTRACAUDAL PRN
Status: DISCONTINUED | OUTPATIENT
Start: 2017-12-23 | End: 2017-12-23 | Stop reason: HOSPADM

## 2017-12-23 RX ORDER — PROMETHAZINE HYDROCHLORIDE 25 MG/ML
6.25 INJECTION, SOLUTION INTRAMUSCULAR; INTRAVENOUS
Status: DISCONTINUED | OUTPATIENT
Start: 2017-12-23 | End: 2017-12-31

## 2017-12-23 RX ORDER — LIDOCAINE HYDROCHLORIDE 10 MG/ML
INJECTION, SOLUTION INFILTRATION; PERINEURAL PRN
Status: DISCONTINUED | OUTPATIENT
Start: 2017-12-23 | End: 2017-12-23

## 2017-12-23 RX ORDER — ONDANSETRON 4 MG/1
4 TABLET, ORALLY DISINTEGRATING ORAL EVERY 6 HOURS PRN
Status: DISCONTINUED | OUTPATIENT
Start: 2017-12-23 | End: 2017-12-23 | Stop reason: ALTCHOICE

## 2017-12-23 RX ORDER — GLYCOPYRROLATE 0.2 MG/ML
INJECTION, SOLUTION INTRAMUSCULAR; INTRAVENOUS PRN
Status: DISCONTINUED | OUTPATIENT
Start: 2017-12-23 | End: 2017-12-23

## 2017-12-23 RX ORDER — LIDOCAINE 40 MG/G
CREAM TOPICAL
Status: DISCONTINUED | OUTPATIENT
Start: 2017-12-23 | End: 2017-12-31 | Stop reason: HOSPADM

## 2017-12-23 RX ORDER — DIPHENHYDRAMINE HYDROCHLORIDE 50 MG/ML
25 INJECTION INTRAMUSCULAR; INTRAVENOUS EVERY 6 HOURS PRN
Status: DISCONTINUED | OUTPATIENT
Start: 2017-12-23 | End: 2017-12-31 | Stop reason: HOSPADM

## 2017-12-23 RX ORDER — LIDOCAINE 40 MG/G
CREAM TOPICAL
Status: DISCONTINUED | OUTPATIENT
Start: 2017-12-23 | End: 2017-12-24

## 2017-12-23 RX ORDER — METOPROLOL TARTRATE 1 MG/ML
5 INJECTION, SOLUTION INTRAVENOUS ONCE
Status: COMPLETED | OUTPATIENT
Start: 2017-12-23 | End: 2017-12-23

## 2017-12-23 RX ORDER — FENTANYL CITRATE 50 UG/ML
25-50 INJECTION, SOLUTION INTRAMUSCULAR; INTRAVENOUS
Status: DISCONTINUED | OUTPATIENT
Start: 2017-12-23 | End: 2017-12-31

## 2017-12-23 RX ORDER — ALBUTEROL SULFATE 0.83 MG/ML
2.5 SOLUTION RESPIRATORY (INHALATION) EVERY 4 HOURS PRN
Status: DISCONTINUED | OUTPATIENT
Start: 2017-12-23 | End: 2017-12-31 | Stop reason: HOSPADM

## 2017-12-23 RX ADMIN — ACETAMINOPHEN 650 MG: 325 TABLET, FILM COATED ORAL at 05:57

## 2017-12-23 RX ADMIN — SENNOSIDES AND DOCUSATE SODIUM 2 TABLET: 8.6; 5 TABLET ORAL at 20:28

## 2017-12-23 RX ADMIN — Medication 3.5 MG: at 15:34

## 2017-12-23 RX ADMIN — OXYCODONE HYDROCHLORIDE 10 MG: 5 TABLET ORAL at 20:28

## 2017-12-23 RX ADMIN — FENTANYL CITRATE 50 MCG: 50 INJECTION, SOLUTION INTRAMUSCULAR; INTRAVENOUS at 14:30

## 2017-12-23 RX ADMIN — HYDROMORPHONE HYDROCHLORIDE 0.5 MG: 1 INJECTION, SOLUTION INTRAMUSCULAR; INTRAVENOUS; SUBCUTANEOUS at 11:27

## 2017-12-23 RX ADMIN — WATER 1 G: 1 INJECTION INTRAMUSCULAR; INTRAVENOUS; SUBCUTANEOUS at 20:52

## 2017-12-23 RX ADMIN — FAMOTIDINE 20 MG: 10 INJECTION, SOLUTION INTRAVENOUS at 08:34

## 2017-12-23 RX ADMIN — NICOTINE 1 PATCH: 21 PATCH, EXTENDED RELEASE TRANSDERMAL at 21:40

## 2017-12-23 RX ADMIN — SODIUM CHLORIDE, POTASSIUM CHLORIDE, SODIUM LACTATE AND CALCIUM CHLORIDE: 600; 310; 30; 20 INJECTION, SOLUTION INTRAVENOUS at 14:35

## 2017-12-23 RX ADMIN — ROCURONIUM BROMIDE 50 MG: 10 INJECTION INTRAVENOUS at 14:40

## 2017-12-23 RX ADMIN — HYDROMORPHONE HYDROCHLORIDE 0.5 MG: 1 INJECTION, SOLUTION INTRAMUSCULAR; INTRAVENOUS; SUBCUTANEOUS at 18:04

## 2017-12-23 RX ADMIN — SODIUM CHLORIDE: 9 INJECTION, SOLUTION INTRAVENOUS at 00:55

## 2017-12-23 RX ADMIN — HYDROMORPHONE HYDROCHLORIDE 0.5 MG: 1 INJECTION, SOLUTION INTRAMUSCULAR; INTRAVENOUS; SUBCUTANEOUS at 01:01

## 2017-12-23 RX ADMIN — SODIUM CHLORIDE: 9 INJECTION, SOLUTION INTRAVENOUS at 06:03

## 2017-12-23 RX ADMIN — PROPOFOL 300 MG: 10 INJECTION, EMULSION INTRAVENOUS at 14:40

## 2017-12-23 RX ADMIN — METOPROLOL TARTRATE 5 MG: 5 INJECTION INTRAVENOUS at 15:57

## 2017-12-23 RX ADMIN — OXYCODONE HYDROCHLORIDE 10 MG: 5 TABLET ORAL at 08:34

## 2017-12-23 RX ADMIN — SODIUM CHLORIDE: 9 INJECTION, SOLUTION INTRAVENOUS at 18:03

## 2017-12-23 RX ADMIN — FENTANYL CITRATE 100 MCG: 50 INJECTION, SOLUTION INTRAMUSCULAR; INTRAVENOUS at 14:40

## 2017-12-23 RX ADMIN — DEXAMETHASONE SODIUM PHOSPHATE 4 MG: 4 INJECTION, SOLUTION INTRA-ARTICULAR; INTRALESIONAL; INTRAMUSCULAR; INTRAVENOUS; SOFT TISSUE at 14:40

## 2017-12-23 RX ADMIN — OXYCODONE HYDROCHLORIDE 10 MG: 5 TABLET ORAL at 23:07

## 2017-12-23 RX ADMIN — ACETAMINOPHEN 975 MG: 325 TABLET, FILM COATED ORAL at 18:02

## 2017-12-23 RX ADMIN — FAMOTIDINE 20 MG: 10 INJECTION, SOLUTION INTRAVENOUS at 20:29

## 2017-12-23 RX ADMIN — HYDROMORPHONE HYDROCHLORIDE 1 MG: 1 INJECTION, SOLUTION INTRAMUSCULAR; INTRAVENOUS; SUBCUTANEOUS at 14:54

## 2017-12-23 RX ADMIN — FENTANYL CITRATE 50 MCG: 50 INJECTION, SOLUTION INTRAMUSCULAR; INTRAVENOUS at 14:09

## 2017-12-23 RX ADMIN — GLYCOPYRROLATE 0.7 MG: 0.2 INJECTION, SOLUTION INTRAMUSCULAR; INTRAVENOUS at 15:34

## 2017-12-23 RX ADMIN — PROCHLORPERAZINE MALEATE 10 MG: 5 TABLET, FILM COATED ORAL at 20:28

## 2017-12-23 RX ADMIN — MIDAZOLAM 2 MG: 1 INJECTION INTRAMUSCULAR; INTRAVENOUS at 14:35

## 2017-12-23 RX ADMIN — FAMOTIDINE 20 MG: 10 INJECTION, SOLUTION INTRAVENOUS at 00:57

## 2017-12-23 RX ADMIN — LIDOCAINE HYDROCHLORIDE 30 MG: 10 INJECTION, SOLUTION INFILTRATION; PERINEURAL at 14:40

## 2017-12-23 RX ADMIN — SODIUM CHLORIDE: 9 INJECTION, SOLUTION INTRAVENOUS at 11:28

## 2017-12-23 RX ADMIN — SODIUM CHLORIDE, POTASSIUM CHLORIDE, SODIUM LACTATE AND CALCIUM CHLORIDE: 600; 310; 30; 20 INJECTION, SOLUTION INTRAVENOUS at 14:45

## 2017-12-23 ASSESSMENT — PAIN DESCRIPTION - DESCRIPTORS
DESCRIPTORS: DULL
DESCRIPTORS: ACHING;DISCOMFORT;TENDER
DESCRIPTORS: ACHING
DESCRIPTORS: ACHING;CRAMPING;DISCOMFORT;TENDER

## 2017-12-23 ASSESSMENT — ACTIVITIES OF DAILY LIVING (ADL)
ADLS_ACUITY_SCORE: 10
ADLS_ACUITY_SCORE: 9
ADLS_ACUITY_SCORE: 9

## 2017-12-23 ASSESSMENT — LIFESTYLE VARIABLES: TOBACCO_USE: 1

## 2017-12-23 NOTE — PROGRESS NOTES
Return to floor post laparoscopic cholecystectomy c grams for ongoing pancreatitis management.  Clears as tolerated today.  Expect 1-3 days further inpatient management of pain and ileus.    Amber Nunez MD

## 2017-12-23 NOTE — OP NOTE
General Surgery Operative Note      Pre-operative diagnosis: gallstone pancreatitis   Post-operative diagnosis: same, chronic cholecystitis and no evidence of choledocholithiasis    Procedure: laparoscopic cholecystectomy  intraoperative cholangiogram     Surgeon: Amber Nunez MD   Assistant(s): Kathy Pollard PA-C  The Physician Assistant was medically necessary for their expertise in prepping, camera management, suctioning, suturing and retraction.   Anesthesia: general    Estimated blood loss:   2 cc     Specimens: gallbladder and contents     DESCRIPTION OF PROCEDURE: The patient was taken to the operating room and placed on the table in supine position. General endotracheal anesthesia was induced and the abdomen was prepped and draped in standard sterile fashion. An incision was made just above the umbilicus with a blade. The incision was carried down to the fascia. The fascia was incised in the midline with a blade. The peritoneum was entered bluntly with a Carmalt clamp. Two interrupted 0 Vicryl sutures were placed at the extremes of this fascial incision. The Darwin trocar was introduced and the abdomen was insufflated with CO2. A 5 mm trocar was placed in the subxiphoid position. A 5 mm trocar was placed in the right upper quadrant, just below the costal margin at the midclavicular line. Another was placed at the anterior axillary line just below the costal margin on the right. The patient was placed in reverse Trendelenburg and right side up. The gallbladder appeared mildly thickened. The fundus of the gallbladder was grasped and retracted cephalad. The infundibulum was grasped and retracted laterally. The peritoneum over the medial and lateral aspects of the triangle of Calot was taken down with the [place] dissector.  The triangle of Calot was skeletonized, thus obtaining the critical view of safety.  The infundibulum of the gallbladder was grasped with the Ricks clamp. Thereby the  cholangiogram catheter was introduced and used to canulate the infundibulum of the gallbladder. The cholangiogram revealed a biliary tree without filling defects. The radiologist confirmed these findings. The cholangiogram catheter was removed from the abdomen. The duct was thrice clipped and then transected, leaving two clips on the cystic duct stump. The cystic artery was freed up from surrounding tissues. It was clipped twice proximally, once distally and transected with the hook scissors. A thorough evaluation of the triangle of Calot revealed no aberrant ducts or vessels. The gallbladder was then removed from the liver using the hook electrocautery. The gallbladder was passed into an Endocatch bag and removed through the umbilical trocar site. We observed the right upper quadrant carefully for hemostasis. Hemostasis was assured. We irrigated with copious amounts of sterile saline and aspirated the effluent. The right upper quadrant trocar sites were anesthetized with local anesthetic. Each of the trocars was removed under direct visualization. There was no bleeding from any of these sites.  The Darwin trocar was removed and the abdomen was evacuated of CO2.  One additional interrupted 0 Vicryl suture was placed in the umbilical trocar site fascia.  Each of the three 0 Vicryl sutures was cinched down and tied. The skin of the umbilical incision was anesthetized with local anesthetic.  All of the incisions were closed with interrupted 4-0 Vicryl subcuticular sutures and Steri-Strips.  The patient tolerated the procedure well.  Sponge and instrument counts were correct.   Amber Nunez MD

## 2017-12-23 NOTE — OR NURSING
Dr. Jonathan Lucio at bedside.  Approved for transfer back to [facility].

## 2017-12-23 NOTE — ANESTHESIA CARE TRANSFER NOTE
Patient: Contreras Pearson    Procedure(s):  LAPAROSCOPIC CHOLECYSTECTOMY WITH CHOLANGIOGRAMS  - Wound Class: III-Contaminated    Diagnosis: unknown  Diagnosis Additional Information: No value filed.    Anesthesia Type:   General, ETT     Note:  Airway :Face Mask  Patient transferred to:PACU  Comments: Oxygen per face mask, report to RN in PACU.      Vitals: (Last set prior to Anesthesia Care Transfer)    CRNA VITALS  12/23/2017 1512 - 12/23/2017 1547      12/23/2017             EKG: NSR                Electronically Signed By: RASHEED Castellanos CRNA  December 23, 2017  3:47 PM

## 2017-12-23 NOTE — PLAN OF CARE
Problem: Patient Care Overview  Goal: Plan of Care/Patient Progress Review  Outcome: No Change  Patient readmitted after ERCP around 1830. IV Dilaudid given for pain. Started on clear liquid diet. Up independently.

## 2017-12-23 NOTE — PLAN OF CARE
Problem: Patient Care Overview  Goal: Plan of Care/Patient Progress Review  Outcome: No Change  Patient awake most of this shift. Vital signs stable, although tmax of 100.7. Text paged MD for Tylenol. Complaints of abdominal pain 7/10 oxycodone administered and ineffective, IV dilaudid administered and effective. Up with SBA. IVF infusing. Wife here this AM. After wife left patient denies pain/discomfort and is wondering when able to discharge. Continue POC.

## 2017-12-23 NOTE — PHARMACY-ADMISSION MEDICATION HISTORY
Med rec was done yesterday. Pt went to [facility] today for a procedure and re-admitted here.     Admission medication history interview status for this patient is complete. See [facility] admission navigator for allergy information, prior to admission medications and immunization status.      Medication history interview source(s):Patient  Medication history resources (including written lists, pill bottles, clinic record):None  Primary pharmacy:     Changes made to PTA medication list:  Added:  both  Deleted:   Changed:      Actions taken by pharmacist (provider contacted, etc):None      Additional medication history information:None     Medication reconciliation/reorder completed by provider prior to medication history? No     Do you take OTC medications (eg tylenol, ibuprofen, fish oil, eye/ear drops, etc)? n(Y/N)     For patients on insulin therapy: n (Y/N)  Lantus/levemir/NPH/Mix 70/30 dose:   (Y/N) (see Med list for doses)   Sliding scale Novolog Y/N  If Yes, do you have a baseline novolog pre-meal dose:  units with meals  Patients eat three meals a day:   Y/N    How many episodes of hypoglycemia do you have per week: _______  How many missed doses do you have per week: ______  How many times do you check your blood glucose per day: _______   Any Barriers to therapy - Be specific :  cost of medications, comfortable with giving injections (if applicable), comfortable and confident with current diabetes regimen: Y/N ______________               Prior to Admission medications    Medication Sig Last Dose Taking? Auth Provider   SUCRALFATE PO Take 1 g by mouth 4 times daily  12/21/2017 at x2 Yes Reported, Patient   PANTOPRAZOLE SODIUM PO Take 40 mg by mouth every morning (before breakfast)  12/21/2017 at Unknown time Yes Reported, Patient

## 2017-12-23 NOTE — PROGRESS NOTES
"[facility]  Hospitalist Progress Note    Roni Wheeler MD 12/23/2017  Text Page      Reason for Stay (Diagnosis): gallstone pancreatitis         Assessment and Plan:      Summary of Stay: Contreras Pearson is a 52 year old male admitted on 12/22/2017 with gallstone pancreatitis.  He was found to have acute pancreatitis and abnormal liver enzymes at admission.  EUS yesterday revealed sludge in GB but CBD looked clear.       Problem List:   1. Gallstone pancreatitis:  On retrospect he reports that he has had \"heartburn\" for at least a year.  EUS yesterday did not show CBD stones and he presumably passed stones.    - General surgery consult- planning for cholecystectomy  - empiric abx with invanz for cholangitis  - diet per surgery      DVT Prophylaxis: Pneumatic Compression Devices  Code Status: Full Code  Discharge Dispo: home when tolerating diet and after surgery- maybe home tomorrow  Incidental Findings/ Discharge Issues: none        Interval History (Subjective):      Still having some epigastric and lower abd pain but much better than at admission.                  Physical Exam:      Last Vital Signs:  BP (!) 179/98  Pulse 115  Temp 99.7  F (37.6  C) (Oral)  Resp 18  Ht 1.88 m (6' 2\")  Wt 104.8 kg (231 lb)  SpO2 95%  BMI 29.66 kg/m2      Vital signs reviewed  General:  Alert, calm, NAD  CV: regular rate and rhythm, no murmurs or rubs  Lungs:  Clear to ascultation bilaterally, normal respiratory effort  Abdomen:  Soft, diffuse tenderness- worse in epigastric area, nondistended, no masses, normal bowel sounds  Extremities:  No edema  Neuro: normal strength and sensation in all 4 extremities, cranial nerves grossly intact  Psychiatric:  Mood and affect within normal limits             Medications:      All current medications were reviewed with changes reflected in problem list.         Data:      All new lab and imaging data was reviewed.   Labs:  Lipase 1948 from peak 14014  Bili 2.1 " from 2.5   from 251    from 464  Wbc 16.9 from 17.2

## 2017-12-23 NOTE — PLAN OF CARE
Problem: Patient Care Overview  Goal: Plan of Care/Patient Progress Review  Outcome: No Change  Patient alert and oriented x4.  VSS except elevated BP.  Up w/ SBA.  Voiding well.  NPO for surgery and fluids infusing at 175/HR.  C/O pain and oral Oxy given x1 and IV Dilaudid x1 w/ relief.  Went down to PACU at 1345 for lap coly.  Will continue to monitor.

## 2017-12-23 NOTE — ANESTHESIA PREPROCEDURE EVALUATION
Anesthesia Evaluation     .             ROS/MED HX    ENT/Pulmonary:     (+)tobacco use, Current use , . .    Neurologic:  - neg neurologic ROS     Cardiovascular:  - neg cardiovascular ROS       METS/Exercise Tolerance:     Hematologic:  - neg hematologic  ROS       Musculoskeletal:  - neg musculoskeletal ROS       GI/Hepatic:  - neg GI/hepatic ROS   (+) cholecystitis/cholelithiasis,       Renal/Genitourinary:  - ROS Renal section negative       Endo: Comment: ..Body mass index is 29.66 kg/(m^2).          Psychiatric:  - neg psychiatric ROS       Infectious Disease:  - neg infectious disease ROS       Malignancy:         Other: Comment: .Lab Test        12/23/17 12/22/17 12/21/17 12/19/17                       0927          0409          1701          2300          WBC          16.9*        17.2*        24.5*        5.8           HGB          13.2*        14.7         16.5         14.5          MCV          88           88           87           88            PLT          214          303          375          261           INR           --           --          0.89         0.89           Lab Test        12/23/17 12/22/17 12/21/17                       0927          0409          1701          NA           138          141          141           POTASSIUM    3.6          4.3          3.3*          CHLORIDE     107          109          106           CO2          25           26           24            BUN          12           13           11            CR           0.76         0.80         0.90          ANIONGAP     6            6            11            MARLENE          8.0*         8.6          8.9           GLC          99           166*         232*             - neg other ROS                 Physical Exam  Normal systems: cardiovascular and pulmonary    Airway   Mallampati: II    Dental   (+) chipped  Comment: Extensive dental caries    Cardiovascular   Rhythm and rate: regular and  normal      Pulmonary    breath sounds clear to auscultation                    Anesthesia Plan      History & Physical Review  History and physical reviewed and following examination; no interval change.    ASA Status:  2 .        Plan for General and ETT with Intravenous and Propofol induction. Maintenance will be Inhalation and Balanced.    PONV prophylaxis:  Ondansetron (or other 5HT-3) and Dexamethasone or Solumedrol       Postoperative Care  Postoperative pain management:  IV analgesics, Oral pain medications and Multi-modal analgesia.      Consents  Anesthetic plan, risks, benefits and alternatives discussed with:  Patient or representative..                          .

## 2017-12-24 LAB
ALBUMIN SERPL-MCNC: 2.2 G/DL (ref 3.4–5)
ALP SERPL-CCNC: 186 U/L (ref 40–150)
ALT SERPL W P-5'-P-CCNC: 204 U/L (ref 0–70)
ANION GAP SERPL CALCULATED.3IONS-SCNC: 8 MMOL/L (ref 3–14)
AST SERPL W P-5'-P-CCNC: 90 U/L (ref 0–45)
BILIRUB SERPL-MCNC: 2 MG/DL (ref 0.2–1.3)
BUN SERPL-MCNC: 10 MG/DL (ref 7–30)
CALCIUM SERPL-MCNC: 8 MG/DL (ref 8.5–10.1)
CHLORIDE SERPL-SCNC: 105 MMOL/L (ref 94–109)
CO2 SERPL-SCNC: 23 MMOL/L (ref 20–32)
CREAT SERPL-MCNC: 0.67 MG/DL (ref 0.66–1.25)
GFR SERPL CREATININE-BSD FRML MDRD: >90 ML/MIN/1.7M2
GLUCOSE SERPL-MCNC: 78 MG/DL (ref 70–99)
LACTATE BLD-SCNC: 1 MMOL/L (ref 0.7–2)
LIPASE SERPL-CCNC: 314 U/L (ref 73–393)
POTASSIUM SERPL-SCNC: 3.6 MMOL/L (ref 3.4–5.3)
PROT SERPL-MCNC: 6.2 G/DL (ref 6.8–8.8)
SODIUM SERPL-SCNC: 136 MMOL/L (ref 133–144)

## 2017-12-24 PROCEDURE — 99233 SBSQ HOSP IP/OBS HIGH 50: CPT | Performed by: INTERNAL MEDICINE

## 2017-12-24 RX ADMIN — NICOTINE 1 PATCH: 21 PATCH, EXTENDED RELEASE TRANSDERMAL at 21:34

## 2017-12-24 RX ADMIN — SODIUM CHLORIDE: 9 INJECTION, SOLUTION INTRAVENOUS at 04:29

## 2017-12-24 RX ADMIN — FAMOTIDINE 20 MG: 10 INJECTION, SOLUTION INTRAVENOUS at 08:07

## 2017-12-24 RX ADMIN — ONDANSETRON 4 MG: 4 TABLET, ORALLY DISINTEGRATING ORAL at 08:07

## 2017-12-24 RX ADMIN — ACETAMINOPHEN 975 MG: 325 TABLET, FILM COATED ORAL at 02:10

## 2017-12-24 RX ADMIN — OXYCODONE HYDROCHLORIDE 10 MG: 5 TABLET ORAL at 12:17

## 2017-12-24 RX ADMIN — SENNOSIDES AND DOCUSATE SODIUM 2 TABLET: 8.6; 5 TABLET ORAL at 20:46

## 2017-12-24 RX ADMIN — WATER 1 G: 1 INJECTION INTRAMUSCULAR; INTRAVENOUS; SUBCUTANEOUS at 18:56

## 2017-12-24 RX ADMIN — OXYCODONE HYDROCHLORIDE 10 MG: 5 TABLET ORAL at 07:55

## 2017-12-24 RX ADMIN — OXYCODONE HYDROCHLORIDE 10 MG: 5 TABLET ORAL at 18:26

## 2017-12-24 RX ADMIN — ACETAMINOPHEN 975 MG: 325 TABLET, FILM COATED ORAL at 09:11

## 2017-12-24 RX ADMIN — OXYCODONE HYDROCHLORIDE 10 MG: 5 TABLET ORAL at 02:10

## 2017-12-24 RX ADMIN — OXYCODONE HYDROCHLORIDE 10 MG: 5 TABLET ORAL at 15:18

## 2017-12-24 RX ADMIN — SODIUM CHLORIDE: 9 INJECTION, SOLUTION INTRAVENOUS at 14:34

## 2017-12-24 RX ADMIN — OXYCODONE HYDROCHLORIDE 10 MG: 5 TABLET ORAL at 21:35

## 2017-12-24 RX ADMIN — ONDANSETRON 4 MG: 4 TABLET, ORALLY DISINTEGRATING ORAL at 19:01

## 2017-12-24 RX ADMIN — HYDROMORPHONE HYDROCHLORIDE 0.5 MG: 1 INJECTION, SOLUTION INTRAMUSCULAR; INTRAVENOUS; SUBCUTANEOUS at 16:24

## 2017-12-24 RX ADMIN — FAMOTIDINE 20 MG: 10 INJECTION, SOLUTION INTRAVENOUS at 20:46

## 2017-12-24 RX ADMIN — ACETAMINOPHEN 975 MG: 325 TABLET, FILM COATED ORAL at 17:37

## 2017-12-24 RX ADMIN — SENNOSIDES AND DOCUSATE SODIUM 2 TABLET: 8.6; 5 TABLET ORAL at 07:55

## 2017-12-24 ASSESSMENT — ACTIVITIES OF DAILY LIVING (ADL)
ADLS_ACUITY_SCORE: 9
ADLS_ACUITY_SCORE: 10

## 2017-12-24 ASSESSMENT — PAIN DESCRIPTION - DESCRIPTORS
DESCRIPTORS: ACHING

## 2017-12-24 NOTE — PLAN OF CARE
Problem: Patient Care Overview  Goal: Plan of Care/Patient Progress Review  Outcome: Therapy, progress towards functional goals is fair  Patient received from PACU via stretcher post Laparoscopic cholecystectomy with cholangiograms. Patient has x4 lap sites with steri strips & clean dressings intact, abdominal distention noted Hypoactive bowel sounds in all quadrants. Capnography and O2 NC @ 2L with sats =/> 93%, No noted respiratory or cardiac distress.      2050: Invanz 1gram IVP given over 5 minutes via Left PIV w/o difficulty, No redness/warmth noted at IV site or surrounding tissue.

## 2017-12-24 NOTE — PLAN OF CARE
Problem: Patient Care Overview  Goal: Plan of Care/Patient Progress Review  Outcome: No Change  Pt VSS, t-max 100.1 this shift, scheduled tylenol  Walked in the halls  Pain controlled with oral oxycodone, ice and heat- back pain worse then incisional pain  Tolerating clears however minimal intake  Capnography in place

## 2017-12-24 NOTE — PROGRESS NOTES
"[facility]  Hospitalist Progress Note    Roni Wheeler MD 12/24/2017  Text Page      Reason for Stay (Diagnosis): gallstone pancreatitis         Assessment and Plan:      Summary of Stay: Conrteras Pearson is a 52 year old male admitted on 12/22/2017 with gallstone pancreatitis.  He was found to have acute pancreatitis and abnormal liver enzymes at admission.  EUS yesterday revealed sludge in GB but CBD looked clear.       Problem List:   1. Gallstone pancreatitis:  On retrospect he reports that he has had \"heartburn\" for at least a year.  EUS 12/22 did not show CBD stones and he presumably passed stones.    - Had lap cholecystectomy 12/23.  Doing ok but still has pain, fever and ileus.   - empiric abx with invanz for cholangitis  - clear liquid diet per surgery  - lipase now normal      DVT Prophylaxis: Pneumatic Compression Devices  Code Status: Full Code  Discharge Dispo: home when tolerating diet and after surgery- maybe home in 1-3 days  Incidental Findings/ Discharge Issues: none        Interval History (Subjective):      Still having some epigastric and back pain.                    Physical Exam:      Last Vital Signs:  BP (!) 154/96 (BP Location: Left arm)  Pulse 103  Temp 100.1  F (37.8  C) (Oral)  Resp 24  Ht 1.88 m (6' 2\")  Wt 104.8 kg (231 lb)  SpO2 91%  BMI 29.66 kg/m2      Vital signs reviewed  General:  Alert, calm, NAD  CV: regular rate and rhythm, no murmurs or rubs  Lungs:  Clear to ascultation bilaterally, normal respiratory effort  Abdomen:  Soft, diffuse tenderness- worse in epigastric area, nondistended, no masses, normal bowel sounds  Extremities:  No edema  Neuro: normal strength and sensation in all 4 extremities, cranial nerves grossly intact  Psychiatric:  Mood and affect within normal limits             Medications:      All current medications were reviewed with changes reflected in problem list.         Data:      All new lab and imaging data was reviewed. "   Labs:  Lipase 314 from 1948 from peak 37451  Bili 2.0 from 2.1 from 2.5  AST 90 from 143 from 251    from 289 from 464

## 2017-12-24 NOTE — PROGRESS NOTES
"[facility]   General Surgery Progress Note           Assessment and Plan:   Assessment:   POD#1 s/p Procedure(s):  LAPAROSCOPIC CHOLECYSTECTOMY WITH CHOLANGIOGRAMS  - Wound Class: III-Contaminated  Pancreatitis with ongoing pain and ileus.      Plan:   Continue clears for now.  Advance diet when evidence of return of bowel function and pain improved.  Work note written.  Pt to contact my office with any additional paperwork for work.         Interval History:   Has taken water and 2 popcycles thus far.  Bloated.  Continues to have abd and back pain.  No BM.         Physical Exam:   Blood pressure (!) 154/96, pulse 103, temperature 100.1  F (37.8  C), temperature source Oral, resp. rate 24, height 1.88 m (6' 2\"), weight 104.8 kg (231 lb), SpO2 91 %.    I/O last 3 completed shifts:  In: 3015 [P.O.:300; I.V.:2715]  Out: 452 [Urine:450; Blood:2]    Abdomen: soft, distended. Moderate ttp mid epigastrium.  Mild ttp o/w diffusely.  Inc(s) - dressing clean, dry, intact.               Data:     Recent Labs   Lab Test  12/23/17   0927  12/22/17   0409  12/21/17   1701   HGB  13.2*  14.7  16.5   WBC  16.9*  17.2*  24.5*       Amber Nunez MD     "

## 2017-12-24 NOTE — PLAN OF CARE
Problem: Patient Care Overview  Goal: Plan of Care/Patient Progress Review  Outcome: No Change  Patient resting on and off throughout night. Vital signs stable although blood pressure slightly elevated. Capnography alarmed multiple times throughout night for increased respirations. Oxycodone 10 mg administered for pain as well as scheduled tylenol, moderate relief. Up with assist of 1 to bathroom. Tolerating clear liquids ok. Denies flatus. Hypoactive bowel sounds. Alert/oriented x4. Incisions covered, one dressing with dried drainage, all others CDI. Continue POC.

## 2017-12-25 LAB
LACTATE BLD-SCNC: 0.8 MMOL/L (ref 0.7–2)
WBC # BLD AUTO: 14.1 10E9/L (ref 4–11)

## 2017-12-25 PROCEDURE — 99233 SBSQ HOSP IP/OBS HIGH 50: CPT | Performed by: INTERNAL MEDICINE

## 2017-12-25 RX ADMIN — ACETAMINOPHEN 975 MG: 325 TABLET, FILM COATED ORAL at 16:11

## 2017-12-25 RX ADMIN — OXYCODONE HYDROCHLORIDE 10 MG: 5 TABLET ORAL at 10:21

## 2017-12-25 RX ADMIN — ACETAMINOPHEN 975 MG: 325 TABLET, FILM COATED ORAL at 09:09

## 2017-12-25 RX ADMIN — OXYCODONE HYDROCHLORIDE 10 MG: 5 TABLET ORAL at 06:05

## 2017-12-25 RX ADMIN — ONDANSETRON 4 MG: 4 TABLET, ORALLY DISINTEGRATING ORAL at 01:08

## 2017-12-25 RX ADMIN — SENNOSIDES AND DOCUSATE SODIUM 2 TABLET: 8.6; 5 TABLET ORAL at 21:06

## 2017-12-25 RX ADMIN — NICOTINE 1 PATCH: 21 PATCH, EXTENDED RELEASE TRANSDERMAL at 21:05

## 2017-12-25 RX ADMIN — FAMOTIDINE 20 MG: 10 INJECTION, SOLUTION INTRAVENOUS at 09:13

## 2017-12-25 RX ADMIN — HYDROMORPHONE HYDROCHLORIDE 0.3 MG: 1 INJECTION, SOLUTION INTRAMUSCULAR; INTRAVENOUS; SUBCUTANEOUS at 17:08

## 2017-12-25 RX ADMIN — SODIUM CHLORIDE: 9 INJECTION, SOLUTION INTRAVENOUS at 00:56

## 2017-12-25 RX ADMIN — OXYCODONE HYDROCHLORIDE 10 MG: 5 TABLET ORAL at 00:53

## 2017-12-25 RX ADMIN — SODIUM CHLORIDE: 9 INJECTION, SOLUTION INTRAVENOUS at 21:05

## 2017-12-25 RX ADMIN — OXYCODONE HYDROCHLORIDE 10 MG: 5 TABLET ORAL at 14:09

## 2017-12-25 RX ADMIN — SENNOSIDES AND DOCUSATE SODIUM 2 TABLET: 8.6; 5 TABLET ORAL at 09:09

## 2017-12-25 RX ADMIN — ONDANSETRON 4 MG: 4 TABLET, ORALLY DISINTEGRATING ORAL at 16:06

## 2017-12-25 RX ADMIN — SODIUM CHLORIDE: 9 INJECTION, SOLUTION INTRAVENOUS at 11:14

## 2017-12-25 RX ADMIN — ACETAMINOPHEN 975 MG: 325 TABLET, FILM COATED ORAL at 00:53

## 2017-12-25 RX ADMIN — WATER 1 G: 1 INJECTION INTRAMUSCULAR; INTRAVENOUS; SUBCUTANEOUS at 19:06

## 2017-12-25 RX ADMIN — FAMOTIDINE 20 MG: 10 INJECTION, SOLUTION INTRAVENOUS at 21:05

## 2017-12-25 RX ADMIN — OXYCODONE HYDROCHLORIDE 10 MG: 5 TABLET ORAL at 19:06

## 2017-12-25 ASSESSMENT — PAIN DESCRIPTION - DESCRIPTORS
DESCRIPTORS: CRAMPING
DESCRIPTORS: ACHING
DESCRIPTORS: CRAMPING
DESCRIPTORS: CRAMPING

## 2017-12-25 ASSESSMENT — ACTIVITIES OF DAILY LIVING (ADL)
ADLS_ACUITY_SCORE: 9

## 2017-12-25 NOTE — CONSULTS
"CLINICAL NUTRITION SERVICES  -  ASSESSMENT NOTE      Malnutrition: Does not meet criteria at this time        REASON FOR ASSESSMENT  Contreras Pearson is a 52 year old male seen by Registered Dietitian for Admission Nutrition Risk Screen - Unintentional weight loss of 10# or more in past 2 months, Reduced oral intake over the last month.    NUTRITION HISTORY  - Information obtained from patient and chart.    - Admitted with gallstone pancreatitis.    - Regular diet at baseline.  - Meals BID (works 12 hr shifts beginning the evening).    - Feels he has been eating \"less than usual\" (~<75% usual intakes) over the past at least 1 month.  Describes that he began noticing heartburn symptoms associated with especially salads and high-fat dressings during this time.  Also component of overall decreased appetite.  Over the past week pain has been a barrier.  - NKFA.       CURRENT NUTRITION ORDERS  Diet Order:     Fulls    Current Intake/Tolerance:  Clears as of , fulls as of this AM.  With many full liquid items in room during visit.       PHYSICAL FINDINGS  Observed  No fat or muscle loss observed   Obtained from Chart/Interdisciplinary Team  Lap cholecystectomy 2017    ANTHROPOMETRICS  Height: 6' 2\"  Weight: 105 kg (231#)  Body mass index is 29.66 kg/(m^2).  Weight Status:  Overweight BMI 25-29.9  IBW: 86.4 kg (190#)  % IBW: 122%  Weight History:  Wt Readings from Last 10 Encounters:   17 104.8 kg (231 lb)   17 104 kg (229 lb 3.2 oz)   - UBW of 245# per patient report.  He is unsure over what time period he has lost weight as he does not weigh himself.  Reports the only reason he noted his weight loss was because his pants were \"a bit looser\" and his wife had made a comment.  Per chart review, wt has been ?stable since 2017.  Care everywhere wt trendin/15/2017 = 225#, 2017 = 230#.    LABS  Labs reviewed    MEDICATIONS  Medications reviewed    Dosing Weight 91.1 kg - adjusted weight "     ASSESSED NUTRITION NEEDS PER APPROVED PRACTICE GUIDELINES:  Estimated Energy Needs: 2423-4916 kcals (20-25 Kcal/Kg)  Justification: overweight  Estimated Protein Needs: + grams protein (1.0-1.2 g pro/Kg+)  Justification: post-op  Estimated Fluid Needs: 2141-4906 mL (1 mL/Kcal)  Justification: maintenance and per provider pending fluid status    MALNUTRITION:  % Weight Loss:  Weight loss does not meet criteria  % Intake:  <75% for >/= 1 month (non-severe malnutrition)  Subcutaneous Fat Loss:  None observed  Muscle Loss:  None observed  Fluid Retention:  None noted    Malnutrition Diagnosis: Does not meet two indicators at this time    NUTRITION DIAGNOSIS:  Inadequate oral intake related to decreased appetite, heartburn, and pain as evidenced by meeting <75% needs for at least 1 month per patient report.       NUTRITION INTERVENTIONS  Recommendations / Nutrition Prescription  Diet per MD.     Food focus at this time.       Implementation  Nutrition education: Provided education on diet per MD.      Nutrition Goals  Diet order advancement w/in 24-48 hrs.       MONITORING AND EVALUATION:  Progress towards goals will be monitored and evaluated per protocol and Practice Guidelines          Adelia Nguyen RD, LD  Clinical Dietitian  3rd floor/ICU: [phone]  All other floors: [phone]  Weekend/holiday: [phone]

## 2017-12-25 NOTE — PLAN OF CARE
Problem: Cholecystectomy (Adult)  Goal: Signs and Symptoms of Listed Potential Problems Will be Absent, Minimized or Managed (Cholecystectomy)  Signs and symptoms of listed potential problems will be absent, minimized or managed by discharge/transition of care (reference Cholecystectomy (Adult) CPG).  Outcome: No Change  Pt receiving oxycodone every 3hr, scheduled tylenol. Received 0.5mg of IV dilaudid for break through pain. Alternating heat and ice for back pain. Nicotine patch in place. Ambulating independently. Capnography discontinued. Invanz continues. Tolerating clear liquid diet. Faint/hypoactive bowel sounds. No gas passed.

## 2017-12-25 NOTE — PLAN OF CARE
Problem: Patient Care Overview  Goal: Plan of Care/Patient Progress Review  Outcome: Improving  POD#2 from lap inocencio  Lap sites with dermabond, CDI  abd distended, soft, bowel sounds faint hypo, passed small amount of flatus this morning   VSS, afebrile this shift, scheduled tylenol  Up independently in room  Oxycodone for pain control  Tolerating clears

## 2017-12-25 NOTE — PROGRESS NOTES
"[facility]   General Surgery Progress Note           Assessment and Plan:   Assessment:   POD#2 s/p Procedure(s):  LAPAROSCOPIC CHOLECYSTECTOMY WITH CHOLANGIOGRAMS  - Wound Class: III-Contaminated  Nausea improved.  Ileus from pancreatitis starting to resolve. Starting full liquids.      Plan:   -Anticipate discharge in 1-2 days         Interval History:   Very cautious with eating.  His nausea is finally resolved.         Physical Exam:   Blood pressure 164/90, pulse 109, temperature 99.1  F (37.3  C), temperature source Oral, resp. rate 20, height 1.88 m (6' 2\"), weight 104.8 kg (231 lb), SpO2 95 %.    I/O last 3 completed shifts:  In: 3874 [P.O.:1310; I.V.:2564]  Out: 650 [Urine:650]    Abdomen:   soft, distended, tenderness noted diffusely and mild and hypoactive bowel sounds   Inc(s) - clean, dry, intact              Data:     Recent Labs   Lab Test  12/25/17   0730  12/23/17   0927  12/22/17   0409  12/21/17   1701   HGB   --   13.2*  14.7  16.5   WBC  14.1*  16.9*  17.2*  24.5*       Darlene Smith MD     "

## 2017-12-25 NOTE — PROGRESS NOTES
"[facility]  Hospitalist Progress Note    Roni Wheeler MD 12/25/2017  Text Page      Reason for Stay (Diagnosis): gallstone pancreatitis         Assessment and Plan:      Summary of Stay: Contreras Pearson is a 52 year old male admitted on 12/22/2017 with gallstone pancreatitis.  He was found to have acute pancreatitis and abnormal liver enzymes at admission.  EUS 12/22 revealed sludge in GB but CBD looked clear.  Had lap inocencio on 12/23 and recovering from ileus.      Problem List:   1. Gallstone pancreatitis:  On retrospect he reports that he has had \"heartburn\" for at least a year.  EUS 12/22 did not show CBD stones and he presumably passed stones so did not need ERCP.    - Had lap cholecystectomy 12/23 and cholangiogram negative.  Doing ok.  Pain, fever and ileus slowly improving. WBC improved.  Still not much appetite.  Temp 100 F this am.   - empiric abx with invanz (started 12/22- complete 5 days of abx)  for cholangitis  - advancing to full liquid diet   - lipase now normal      DVT Prophylaxis: Pneumatic Compression Devices  Code Status: Full Code  Discharge Dispo: home when tolerating diet and if fevers resolve- maybe home in 1-2 days  Incidental Findings/ Discharge Issues: none        Interval History (Subjective):      Still having some epigastric and back pain but improving.  Passing flatus.                    Physical Exam:      Last Vital Signs:  /90 (BP Location: Left arm)  Pulse 109  Temp 99.1  F (37.3  C) (Oral)  Resp 20  Ht 1.88 m (6' 2\")  Wt 104.8 kg (231 lb)  SpO2 95%  BMI 29.66 kg/m2      Vital signs reviewed  General:  Alert, calm, NAD  CV: regular rate and rhythm, no murmurs or rubs  Lungs:  Clear to ascultation bilaterally, normal respiratory effort  Abdomen:  Soft, diffuse tenderness- worse in epigastric area, nondistended, no masses, normal bowel sounds- tenderness improving  Extremities:  No edema  Neuro: normal strength and sensation in all 4 extremities, " cranial nerves grossly intact  Psychiatric:  Mood and affect within normal limits             Medications:      All current medications were reviewed with changes reflected in problem list.         Data:      All new lab and imaging data was reviewed.   Labs:  Wbc 14.1 from 16.9

## 2017-12-25 NOTE — PLAN OF CARE
Problem: Patient Care Overview  Goal: Plan of Care/Patient Progress Review    VSS ex htn and tmax 100 - recheck T 99.1 after scheduled tylenol. Pain managed with oxy, tylenol, heat, and ice. Incisions WDL. +bs +gas -bm. Tolerating full liquid diet. Voiding. Will continue to monitor.

## 2017-12-26 LAB
LACTATE BLD-SCNC: 0.7 MMOL/L (ref 0.7–2)
LIPASE SERPL-CCNC: 185 U/L (ref 73–393)

## 2017-12-26 PROCEDURE — 99232 SBSQ HOSP IP/OBS MODERATE 35: CPT | Performed by: HOSPITALIST

## 2017-12-26 RX ORDER — CALCIUM CARBONATE 500 MG/1
500-1000 TABLET, CHEWABLE ORAL
Status: DISCONTINUED | OUTPATIENT
Start: 2017-12-26 | End: 2017-12-31 | Stop reason: HOSPADM

## 2017-12-26 RX ORDER — PANTOPRAZOLE SODIUM 40 MG/1
40 TABLET, DELAYED RELEASE ORAL EVERY MORNING
Status: DISCONTINUED | OUTPATIENT
Start: 2017-12-26 | End: 2017-12-31 | Stop reason: HOSPADM

## 2017-12-26 RX ADMIN — SODIUM CHLORIDE: 9 INJECTION, SOLUTION INTRAVENOUS at 22:10

## 2017-12-26 RX ADMIN — OXYCODONE HYDROCHLORIDE 10 MG: 5 TABLET ORAL at 07:55

## 2017-12-26 RX ADMIN — OXYCODONE HYDROCHLORIDE 10 MG: 5 TABLET ORAL at 11:40

## 2017-12-26 RX ADMIN — ACETAMINOPHEN 650 MG: 325 TABLET, FILM COATED ORAL at 18:41

## 2017-12-26 RX ADMIN — OXYCODONE HYDROCHLORIDE 10 MG: 5 TABLET ORAL at 22:13

## 2017-12-26 RX ADMIN — OXYCODONE HYDROCHLORIDE 10 MG: 5 TABLET ORAL at 15:28

## 2017-12-26 RX ADMIN — ACETAMINOPHEN 975 MG: 325 TABLET, FILM COATED ORAL at 07:55

## 2017-12-26 RX ADMIN — FAMOTIDINE 20 MG: 10 INJECTION, SOLUTION INTRAVENOUS at 07:51

## 2017-12-26 RX ADMIN — ONDANSETRON 4 MG: 2 INJECTION INTRAMUSCULAR; INTRAVENOUS at 07:46

## 2017-12-26 RX ADMIN — SODIUM CHLORIDE: 9 INJECTION, SOLUTION INTRAVENOUS at 04:23

## 2017-12-26 RX ADMIN — OXYCODONE HYDROCHLORIDE 10 MG: 5 TABLET ORAL at 00:44

## 2017-12-26 RX ADMIN — NICOTINE 1 PATCH: 21 PATCH, EXTENDED RELEASE TRANSDERMAL at 21:21

## 2017-12-26 RX ADMIN — CALCIUM CARBONATE (ANTACID) CHEW TAB 500 MG 1000 MG: 500 CHEW TAB at 18:02

## 2017-12-26 RX ADMIN — OXYCODONE HYDROCHLORIDE 10 MG: 5 TABLET ORAL at 18:41

## 2017-12-26 RX ADMIN — SENNOSIDES AND DOCUSATE SODIUM 2 TABLET: 8.6; 5 TABLET ORAL at 21:21

## 2017-12-26 RX ADMIN — OXYCODONE HYDROCHLORIDE 10 MG: 5 TABLET ORAL at 04:23

## 2017-12-26 RX ADMIN — SODIUM CHLORIDE: 9 INJECTION, SOLUTION INTRAVENOUS at 13:58

## 2017-12-26 RX ADMIN — SENNOSIDES AND DOCUSATE SODIUM 2 TABLET: 8.6; 5 TABLET ORAL at 07:55

## 2017-12-26 RX ADMIN — ACETAMINOPHEN 975 MG: 325 TABLET, FILM COATED ORAL at 00:45

## 2017-12-26 ASSESSMENT — ACTIVITIES OF DAILY LIVING (ADL)
ADLS_ACUITY_SCORE: 9

## 2017-12-26 ASSESSMENT — PAIN DESCRIPTION - DESCRIPTORS
DESCRIPTORS: ACHING
DESCRIPTORS: ACHING;SHARP
DESCRIPTORS: ACHING
DESCRIPTORS: ACHING;SHARP
DESCRIPTORS: ACHING

## 2017-12-26 NOTE — PLAN OF CARE
Problem: Patient Care Overview  Goal: Plan of Care/Patient Progress Review  Outcome: Improving  PO pain meds for abdominal pain. Up ind, ambulating halls. tolerating diet. voiding well. saline infusing. afebrile tonight. HTN, cont to monitor. no significant events overnight. slept well between cares.

## 2017-12-26 NOTE — PLAN OF CARE
Problem: Patient Care Overview  Goal: Plan of Care/Patient Progress Review  Outcome: Improving  Patient remains hospitalized following lap inocencio, currently POD #3. Pain controlled with Oxycodone and Tylenol. Bowel sounds active. Passing gas. LBM 12/21. Reported nausea without vomiting this shift, relieved by Zofran. Currently on low fat diet, though taking in only full liuquids. IVF continue . Lap sites CDI. Voiding without difficulty. Ambulating independently.

## 2017-12-26 NOTE — PROGRESS NOTES
"[facility]   General Surgery Progress Note           Assessment and Plan:   Assessment:   POD#3 s/p Procedure(s):  LAPAROSCOPIC CHOLECYSTECTOMY WITH CHOLANGIOGRAMS  - Wound Class: III-Contaminated  Tmax 100.4, WBC 14.1  Ileus, as expected, slowly resolving      Plan:   -Continue full liquids, low fat, as tolerated, take it slow  -IV antibiotics: Invanz  -Pain control: oxycodone  -Bowel regimen: senna-docusate BID  -VTE prophylaxis: PCDs  -Dispo: D/C pending diet tolerance and resolution of fevers         Interval History:   Comfortable in bed, wife at bedside. Reports tolerating small amounts of fulls this morning, +nausea with fulls last night, feels this is related to the pudding he ate. Continues with feeling bloated and epigastric abdominal pain. +flatus, -BM. Up walking. Voiding independently.        Physical Exam:   Blood pressure 158/90, pulse 94, temperature 98.9  F (37.2  C), temperature source Oral, resp. rate 22, height 1.88 m (6' 2\"), weight 102 kg (224 lb 14.4 oz), SpO2 95 %.    I/O last 3 completed shifts:  In: 3134 [P.O.:995; I.V.:2139]  Out: 475 [Urine:475]  Tmax 100.4    Abdomen:   soft, distended, tenderness noted diffusely and mild and hypoactive bowel sounds   Inc(s) - clean, dry, steristrips intact              Data:     Recent Labs   Lab Test  12/25/17   0730  12/23/17   0927  12/22/17   0409  12/21/17   1701   HGB   --   13.2*  14.7  16.5   WBC  14.1*  16.9*  17.2*  24.5*       Katyh Pollard PA-C     As above, advance diet as able as ileus resolves  Contreras Gamboa MD  12/26/2017 9:28 AM    "

## 2017-12-26 NOTE — PLAN OF CARE
Problem: Cholecystectomy (Adult)  Goal: Signs and Symptoms of Listed Potential Problems Will be Absent, Minimized or Managed (Cholecystectomy)  Signs and symptoms of listed potential problems will be absent, minimized or managed by discharge/transition of care (reference Cholecystectomy (Adult) CPG).   Outcome: No Change  Hypoactive bowel sounds, passing gas. No BM. T max 100.4. Oral oxycodone Q3 hours for pain, 0.3mg dilaudid given IV for severe pain, education reinforced that narcotics slow down the motility of bowels, the importance of using the IS and taking deep breaths, and walking frequently. Using ice and heat alternatively. Showered with set up help. Full liquid diet, wants to eat later into the night.

## 2017-12-26 NOTE — PROGRESS NOTES
"[facility]  Hospitalist Progress Note    Valarie Odom MD 12/26/2017  Text Page      Reason for Stay (Diagnosis): gallstone pancreatitis         Assessment and Plan:      Summary of Stay: Contreras Pearson is a 52 year old male admitted on 12/22/2017 with gallstone pancreatitis.  He was found to have acute pancreatitis and abnormal liver enzymes at admission.  EUS 12/22 revealed sludge in GB but CBD looked clear.  Had lap inocencio on 12/23 and recovering from ileus.      Problem List:   1. Gallstone pancreatitis:  On retrospect he reports that he has had \"heartburn\" for at least a year.  EUS 12/22 did not show CBD stones and he presumably passed stones so did not need ERCP.    - Had lap cholecystectomy 12/23 and cholangiogram negative.  Doing ok.  Pain, fever and ileus slowly improving. WBC improved.  Still not much appetite.    - empiric abx with invanz (started 12/22- completed 5 days of abx 12/26)  for cholangitis  - advancing to full liquid diet   - lipase now normal      DVT Prophylaxis: Pneumatic Compression Devices  Code Status: Full Code  Discharge Dispo: home when tolerating diet and if fevers resolve- maybe home in 1-2 days  Incidental Findings/ Discharge Issues: none        Interval History (Subjective):      Still having some epigastric and back pain but improving.  Passing flatus.                    Physical Exam:      Last Vital Signs:  /90 (BP Location: Left arm)  Pulse 94  Temp 98.9  F (37.2  C) (Oral)  Resp 20  Ht 1.88 m (6' 2\")  Wt 102 kg (224 lb 14.4 oz)  SpO2 95%  BMI 28.88 kg/m2      Vital signs reviewed  General:  Alert, calm, NAD  CV: regular rate and rhythm, no murmurs or rubs  Lungs:  Clear to ascultation bilaterally, normal respiratory effort  Abdomen:  Soft, diffuse tenderness- worse in epigastric area, nondistended, no masses, normal bowel sounds- tenderness improving  Extremities:  No edema  Neuro: normal strength and sensation in all 4 extremities, cranial " nerves grossly intact  Psychiatric:  Mood and affect within normal limits             Medications:      All current medications were reviewed with changes reflected in problem list.         Data:      All new lab and imaging data was reviewed.   Labs:  Wbc 14.1 from 16.9

## 2017-12-27 LAB
ALBUMIN SERPL-MCNC: 2.3 G/DL (ref 3.4–5)
ALP SERPL-CCNC: 193 U/L (ref 40–150)
ALT SERPL W P-5'-P-CCNC: 89 U/L (ref 0–70)
ANION GAP SERPL CALCULATED.3IONS-SCNC: 5 MMOL/L (ref 3–14)
AST SERPL W P-5'-P-CCNC: 36 U/L (ref 0–45)
BILIRUB DIRECT SERPL-MCNC: 0.6 MG/DL (ref 0–0.2)
BILIRUB SERPL-MCNC: 1 MG/DL (ref 0.2–1.3)
BUN SERPL-MCNC: 9 MG/DL (ref 7–30)
CALCIUM SERPL-MCNC: 8.1 MG/DL (ref 8.5–10.1)
CHLORIDE SERPL-SCNC: 102 MMOL/L (ref 94–109)
CO2 SERPL-SCNC: 29 MMOL/L (ref 20–32)
COPATH REPORT: NORMAL
CREAT SERPL-MCNC: 0.63 MG/DL (ref 0.66–1.25)
ERYTHROCYTE [DISTWIDTH] IN BLOOD BY AUTOMATED COUNT: 14.3 % (ref 10–15)
GFR SERPL CREATININE-BSD FRML MDRD: >90 ML/MIN/1.7M2
GLUCOSE SERPL-MCNC: 97 MG/DL (ref 70–99)
HCT VFR BLD AUTO: 32.5 % (ref 40–53)
HGB BLD-MCNC: 11.1 G/DL (ref 13.3–17.7)
LIPASE SERPL-CCNC: 170 U/L (ref 73–393)
MCH RBC QN AUTO: 29.7 PG (ref 26.5–33)
MCHC RBC AUTO-ENTMCNC: 34.2 G/DL (ref 31.5–36.5)
MCV RBC AUTO: 87 FL (ref 78–100)
PLATELET # BLD AUTO: 305 10E9/L (ref 150–450)
POTASSIUM SERPL-SCNC: 3.1 MMOL/L (ref 3.4–5.3)
POTASSIUM SERPL-SCNC: 3.6 MMOL/L (ref 3.4–5.3)
PROCALCITONIN SERPL-MCNC: 0.64 NG/ML
PROT SERPL-MCNC: 6.1 G/DL (ref 6.8–8.8)
RBC # BLD AUTO: 3.74 10E12/L (ref 4.4–5.9)
SODIUM SERPL-SCNC: 136 MMOL/L (ref 133–144)
WBC # BLD AUTO: 15.1 10E9/L (ref 4–11)

## 2017-12-27 PROCEDURE — 99233 SBSQ HOSP IP/OBS HIGH 50: CPT | Performed by: HOSPITALIST

## 2017-12-27 RX ADMIN — POTASSIUM CHLORIDE 20 MEQ: 1500 TABLET, EXTENDED RELEASE ORAL at 10:38

## 2017-12-27 RX ADMIN — OXYCODONE HYDROCHLORIDE 10 MG: 5 TABLET ORAL at 08:33

## 2017-12-27 RX ADMIN — OXYCODONE HYDROCHLORIDE 10 MG: 5 TABLET ORAL at 18:31

## 2017-12-27 RX ADMIN — PANTOPRAZOLE SODIUM 40 MG: 40 TABLET, DELAYED RELEASE ORAL at 08:33

## 2017-12-27 RX ADMIN — OXYCODONE HYDROCHLORIDE 10 MG: 5 TABLET ORAL at 01:19

## 2017-12-27 RX ADMIN — SENNOSIDES AND DOCUSATE SODIUM 2 TABLET: 8.6; 5 TABLET ORAL at 08:33

## 2017-12-27 RX ADMIN — OXYCODONE HYDROCHLORIDE 10 MG: 5 TABLET ORAL at 15:27

## 2017-12-27 RX ADMIN — ACETAMINOPHEN 650 MG: 325 TABLET, FILM COATED ORAL at 12:29

## 2017-12-27 RX ADMIN — OXYCODONE HYDROCHLORIDE 10 MG: 5 TABLET ORAL at 21:54

## 2017-12-27 RX ADMIN — ONDANSETRON 4 MG: 4 TABLET, ORALLY DISINTEGRATING ORAL at 08:47

## 2017-12-27 RX ADMIN — SODIUM CHLORIDE: 9 INJECTION, SOLUTION INTRAVENOUS at 09:02

## 2017-12-27 RX ADMIN — SENNOSIDES AND DOCUSATE SODIUM 2 TABLET: 8.6; 5 TABLET ORAL at 21:54

## 2017-12-27 RX ADMIN — POTASSIUM CHLORIDE 40 MEQ: 1500 TABLET, EXTENDED RELEASE ORAL at 08:36

## 2017-12-27 RX ADMIN — SODIUM CHLORIDE: 9 INJECTION, SOLUTION INTRAVENOUS at 19:55

## 2017-12-27 RX ADMIN — NICOTINE 1 PATCH: 21 PATCH, EXTENDED RELEASE TRANSDERMAL at 21:54

## 2017-12-27 RX ADMIN — OXYCODONE HYDROCHLORIDE 10 MG: 5 TABLET ORAL at 12:25

## 2017-12-27 RX ADMIN — OXYCODONE HYDROCHLORIDE 10 MG: 5 TABLET ORAL at 04:42

## 2017-12-27 ASSESSMENT — ACTIVITIES OF DAILY LIVING (ADL)
ADLS_ACUITY_SCORE: 9

## 2017-12-27 ASSESSMENT — PAIN DESCRIPTION - DESCRIPTORS: DESCRIPTORS: ACHING

## 2017-12-27 NOTE — PROGRESS NOTES
"[facility]  Hospitalist Progress Note    Valarie Odom MD 12/27/2017  Text Page      Reason for Stay (Diagnosis): gallstone pancreatitis         Assessment and Plan:      Summary of Stay: Contreras Pearson is a 52 year old male admitted on 12/22/2017 with gallstone pancreatitis.  He was found to have acute pancreatitis and abnormal liver enzymes at admission.  EUS 12/22 revealed sludge in GB but CBD looked clear.  Had lap inocencio on 12/23 and recovering from ileus.      Problem List:   1. Gallstone pancreatitis:  On retrospect he reports that he has had \"heartburn\" for at least a year.  EUS 12/22 did not show CBD stones and he presumably passed stones so did not need ERCP.    - Had lap cholecystectomy 12/23 and cholangiogram negative.  Doing ok.  Pain, fever and ileus slowly improving. WBC improved.  Still not much appetite.    - empiric abx with invanz (started 12/22- completed 5 days of abx 12/26)  for cholangitis  - advancing to full liquid diet   - lipase now normal    2. SIRS (fever , leukocytosis): pt continues to have LGF post op and persistent leukocytosis. Completed invanz for 5 days on 12/26. pro calcitonin is elevated although I am not sure if this is trending down or going up due to lack of reference. Differentials include infection vs atelectasis vs inflammation. Lipase has normalized although pt c/o persistent epigastric abd pain   --cont to monitor fever trend  --hold off abx   --incase of fever >101, would obtain blood cx and CT abd w/ contrast       DVT Prophylaxis: Pneumatic Compression Devices  Code Status: Full Code  Discharge Dispo: home when tolerating diet and if fevers resolve- maybe home in 1-2 days  Incidental Findings/ Discharge Issues: none        Interval History (Subjective):      Still having some epigastric and back pain but improving.    Passing flatus.  No BM  Having night sweats                   Physical Exam:      Last Vital Signs:  /84 (BP Location: " "Right arm)  Pulse 94  Temp 99.9  F (37.7  C) (Oral)  Resp 18  Ht 1.88 m (6' 2\")  Wt 102 kg (224 lb 14.4 oz)  SpO2 98%  BMI 28.88 kg/m2      Vital signs reviewed  General:  Alert, calm, NAD  CV: regular rate and rhythm, no murmurs or rubs  Lungs:  Clear to ascultation bilaterally, normal respiratory effort  Abdomen:  Soft, diffuse tenderness- worse in epigastric area, nondistended, no masses, normal bowel sounds- tenderness improving  Extremities:  No edema  Neuro: normal strength and sensation in all 4 extremities, cranial nerves grossly intact  Psychiatric:  Mood and affect within normal limits             Medications:      All current medications were reviewed with changes reflected in problem list.         Data:      All new lab and imaging data was reviewed.   Labs:  Wbc 14.1 from 16.9    "

## 2017-12-27 NOTE — PLAN OF CARE
Problem: Cholecystectomy (Adult)  Goal: Signs and Symptoms of Listed Potential Problems Will be Absent, Minimized or Managed (Cholecystectomy)  Signs and symptoms of listed potential problems will be absent, minimized or managed by discharge/transition of care (reference Cholecystectomy (Adult) CPG).   Outcome: Improving  Bowel sounds active, passing gas, no BM. Tolerating low fat diet, denies nausea. Walking frequently independently. T max 100.1. Oxycodone given Q3 for pain.

## 2017-12-27 NOTE — PROGRESS NOTES
"[facility]   General Surgery Progress Note         Assessment and Plan:   Assessment:   POD#4 s/p Procedure(s):  LAPAROSCOPIC CHOLECYSTECTOMY WITH CHOLANGIOGRAMS  - Wound Class: III-Contaminated  Tmax 100.1, WBC 15.1  Ileus as expected, slowly resolving      Plan:   -Low-fat diet  -IV antibiotics: Invanz completed yesterday  -Pain control: oxycodone  -Bowel regimen: senna-docusate BID  -VTE prophylaxis: PCDs  -Dispo: D/C pending further diet tolerance and resolution of fevers         Interval History:   Resting in bed. Feeling better today but still had some \"sweats\" last night. Has had fullness and some mild nausea after eating, improves quickly after he stops eating. He continues to pass flatus but no BM. He is up walking the halls. Voiding independently. Was hoping to go home today.         Physical Exam:   Blood pressure 152/84, pulse 94, temperature 100.1  F (37.8  C), temperature source Oral, resp. rate 18, height 1.88 m (6' 2\"), weight 102 kg (224 lb 14.4 oz), SpO2 98 %.    I/O last 3 completed shifts:  In: 4959 [P.O.:2380; I.V.:2579]  Out: -   Tmax 100.1    Abdomen:   soft, distended, mild RUQ tenderness and +bowel sounds   Inc(s) - clean, dry, non-tender, steristrips intact              Data:     Recent Labs  Lab 12/27/17  0719 12/25/17  0730 12/23/17  0927 12/22/17  0409   WBC 15.1* 14.1* 16.9* 17.2*   HGB 11.1*  --  13.2* 14.7   HCT 32.5*  --  39.3* 43.5   MCV 87  --  88 88     --  214 303       Recent Labs  Lab 12/27/17  0719 12/24/17  0712 12/23/17  0927   AST 36 90* 143*   ALT 89* 204* 289*   ALKPHOS 193* 186* 237*   BILITOTAL 1.0 2.0* 2.1*           Rodrick Chavis PA-C     Reviewed and agree. Came by to see patient and he is not in his room.  Darlene Smith MD    "

## 2017-12-27 NOTE — PLAN OF CARE
Problem: Patient Care Overview  Goal: Plan of Care/Patient Progress Review  Outcome: Improving  Alert and oriented. Continues to complain of pain, and needing oxycodone every 3-4 hours. No BM yet, but states passing more gas. Afebrile this shift.

## 2017-12-27 NOTE — PLAN OF CARE
Problem: Patient Care Overview  Goal: Plan of Care/Patient Progress Review  Outcome: No Change  Patient remains hospitalized following lap inocencio, currently POD #4. Pain controlled with Oxycodone and Tylenol. Bowel sounds hypoactive. Passing gas. LBM 12/21. Continues on Senna. Reported nausea without vomiting this shift, relieved by Zofran. Tolerating low fat diet. IVF continue . Lap sites CDI. Voiding without difficulty. WBC increased slightly today to 15.1 Ambulating independently.

## 2017-12-28 ENCOUNTER — APPOINTMENT (OUTPATIENT)
Dept: CT IMAGING | Facility: CLINIC | Age: 52
End: 2017-12-28
Attending: HOSPITALIST
Payer: COMMERCIAL

## 2017-12-28 LAB
ALBUMIN SERPL-MCNC: 2.4 G/DL (ref 3.4–5)
ALP SERPL-CCNC: 214 U/L (ref 40–150)
ALT SERPL W P-5'-P-CCNC: 79 U/L (ref 0–70)
ANION GAP SERPL CALCULATED.3IONS-SCNC: 10 MMOL/L (ref 3–14)
AST SERPL W P-5'-P-CCNC: 41 U/L (ref 0–45)
BILIRUB DIRECT SERPL-MCNC: 0.5 MG/DL (ref 0–0.2)
BILIRUB SERPL-MCNC: 0.9 MG/DL (ref 0.2–1.3)
BUN SERPL-MCNC: 8 MG/DL (ref 7–30)
CALCIUM SERPL-MCNC: 8.2 MG/DL (ref 8.5–10.1)
CHLORIDE SERPL-SCNC: 103 MMOL/L (ref 94–109)
CO2 SERPL-SCNC: 24 MMOL/L (ref 20–32)
CREAT SERPL-MCNC: 0.65 MG/DL (ref 0.66–1.25)
ERYTHROCYTE [DISTWIDTH] IN BLOOD BY AUTOMATED COUNT: 14.2 % (ref 10–15)
GFR SERPL CREATININE-BSD FRML MDRD: >90 ML/MIN/1.7M2
GLUCOSE SERPL-MCNC: 120 MG/DL (ref 70–99)
HCT VFR BLD AUTO: 32.4 % (ref 40–53)
HGB BLD-MCNC: 11 G/DL (ref 13.3–17.7)
LIPASE SERPL-CCNC: 203 U/L (ref 73–393)
MCH RBC QN AUTO: 29.7 PG (ref 26.5–33)
MCHC RBC AUTO-ENTMCNC: 34 G/DL (ref 31.5–36.5)
MCV RBC AUTO: 88 FL (ref 78–100)
PLATELET # BLD AUTO: 333 10E9/L (ref 150–450)
POTASSIUM SERPL-SCNC: 3.6 MMOL/L (ref 3.4–5.3)
PROCALCITONIN SERPL-MCNC: 0.37 NG/ML
PROT SERPL-MCNC: 6.3 G/DL (ref 6.8–8.8)
RBC # BLD AUTO: 3.7 10E12/L (ref 4.4–5.9)
SODIUM SERPL-SCNC: 137 MMOL/L (ref 133–144)
WBC # BLD AUTO: 13.9 10E9/L (ref 4–11)

## 2017-12-28 PROCEDURE — 99233 SBSQ HOSP IP/OBS HIGH 50: CPT | Performed by: HOSPITALIST

## 2017-12-28 RX ORDER — SODIUM CHLORIDE AND POTASSIUM CHLORIDE 150; 900 MG/100ML; MG/100ML
INJECTION, SOLUTION INTRAVENOUS CONTINUOUS
Status: DISCONTINUED | OUTPATIENT
Start: 2017-12-28 | End: 2017-12-29

## 2017-12-28 RX ORDER — IOPAMIDOL 755 MG/ML
500 INJECTION, SOLUTION INTRAVASCULAR ONCE
Status: COMPLETED | OUTPATIENT
Start: 2017-12-28 | End: 2017-12-28

## 2017-12-28 RX ADMIN — OXYCODONE HYDROCHLORIDE 10 MG: 5 TABLET ORAL at 00:49

## 2017-12-28 RX ADMIN — PANTOPRAZOLE SODIUM 40 MG: 40 TABLET, DELAYED RELEASE ORAL at 08:09

## 2017-12-28 RX ADMIN — IOPAMIDOL 100 ML: 755 INJECTION, SOLUTION INTRAVENOUS at 10:46

## 2017-12-28 RX ADMIN — OXYCODONE HYDROCHLORIDE 10 MG: 5 TABLET ORAL at 08:09

## 2017-12-28 RX ADMIN — NICOTINE 1 PATCH: 21 PATCH, EXTENDED RELEASE TRANSDERMAL at 22:13

## 2017-12-28 RX ADMIN — OXYCODONE HYDROCHLORIDE 10 MG: 5 TABLET ORAL at 22:13

## 2017-12-28 RX ADMIN — OXYCODONE HYDROCHLORIDE 10 MG: 5 TABLET ORAL at 11:57

## 2017-12-28 RX ADMIN — SODIUM CHLORIDE: 9 INJECTION, SOLUTION INTRAVENOUS at 05:49

## 2017-12-28 RX ADMIN — OXYCODONE HYDROCHLORIDE 10 MG: 5 TABLET ORAL at 15:51

## 2017-12-28 RX ADMIN — OXYCODONE HYDROCHLORIDE 10 MG: 5 TABLET ORAL at 19:15

## 2017-12-28 RX ADMIN — SENNOSIDES AND DOCUSATE SODIUM 2 TABLET: 8.6; 5 TABLET ORAL at 22:13

## 2017-12-28 RX ADMIN — OXYCODONE HYDROCHLORIDE 10 MG: 5 TABLET ORAL at 04:26

## 2017-12-28 RX ADMIN — SODIUM CHLORIDE 65 ML: 9 INJECTION, SOLUTION INTRAVENOUS at 10:47

## 2017-12-28 RX ADMIN — POTASSIUM CHLORIDE AND SODIUM CHLORIDE: 900; 150 INJECTION, SOLUTION INTRAVENOUS at 12:58

## 2017-12-28 RX ADMIN — SENNOSIDES AND DOCUSATE SODIUM 2 TABLET: 8.6; 5 TABLET ORAL at 08:09

## 2017-12-28 ASSESSMENT — ACTIVITIES OF DAILY LIVING (ADL)
ADLS_ACUITY_SCORE: 9

## 2017-12-28 ASSESSMENT — PAIN DESCRIPTION - DESCRIPTORS
DESCRIPTORS: ACHING
DESCRIPTORS: ACHING

## 2017-12-28 NOTE — PLAN OF CARE
Problem: Patient Care Overview  Goal: Plan of Care/Patient Progress Review  Outcome: Improving  Alert and oriented. Pain controlled with oxycodone. Independent. Temp max 100.9. Had moderate BM

## 2017-12-28 NOTE — PROGRESS NOTES
CLINICAL NUTRITION SERVICES - REASSESSMENT NOTE    EVALUATION OF PROGRESS TOWARD GOALS   Diet: Low fat diet  Intake: Pt on phone during attempt to visit, minimal information obtained. Reports no issues currently with appetite, no questions/concerns. RN documentation and meal review shows 100% intakes of adequate meals since diet advancement on 12/26. Pt sitting up in bed during visit, appears well.     Dosing Weight 91.1 kg - adjusted weight      ASSESSED NUTRITION NEEDS PER APPROVED PRACTICE GUIDELINES:  Estimated Energy Needs: 9726-6623 kcals (20-25 Kcal/Kg)  Justification: overweight  Estimated Protein Needs: + grams protein (1.0-1.2 g pro/Kg+)  Justification: post-op  Estimated Fluid Needs: 5918-8826 mL (1 mL/Kcal)  Justification: maintenance and per provider pending fluid status    Previous Goals:   Diet order advancement w/in 24-48 hrs.   Evaluation: Met    Previous Nutrition Diagnosis:   Inadequate oral intake related to decreased appetite, heartburn, and pain as evidenced by meeting <75% needs for at least 1 month per patient report.  Evaluation: completed    MALNUTRITION:  % Weight Loss:  Weight loss does not meet criteria  % Intake:  <75% for >/= 1 month (non-severe malnutrition)  Subcutaneous Fat Loss:  None observed  Muscle Loss:  None observed  Fluid Retention:  None noted     Malnutrition Diagnosis: Does not meet two indicators at this time    CURRENT NUTRITION DIAGNOSIS  Predicted inadequate nutrient intake (energy, protein) related to previously reduced intakes related to decreased appetite, heartburn, pain, now eating well with potential for decline if symptoms arise.      INTERVENTIONS  Recommendations / Nutrition Prescription  Diet per MD - food focus.     Implementation  Collaboration and Referral of Nutrition care: patient discussed during interdisciplinary rounds.     Goals  Pt to tolerate 75% or greater of meals TID.       MONITORING AND EVALUATION:  Progress towards goals will be  monitored and evaluated per protocol and Practice Guidelines      Desire Davies RD, LD  3rd floor/ICU: [phone]  All other floors: [phone]  Weekend/holiday: [phone]  Office: [phone]

## 2017-12-28 NOTE — PROGRESS NOTES
"[facility]  Hospitalist Progress Note    Valarie Odom MD 12/28/2017  Text Page      Reason for Stay (Diagnosis): gallstone pancreatitis         Assessment and Plan:      Summary of Stay: Contreras Pearson is a 52 year old male admitted on 12/22/2017 with gallstone pancreatitis.  He was found to have acute pancreatitis and abnormal liver enzymes at admission.  EUS 12/22 revealed sludge in GB but CBD looked clear.  Had lap inocencio on 12/23 and recovered from ileus however conitnued to have abd pain and fevers. Repeat CT abd on 12/29 showed persistent pancreatitis and significant inflammation which explains both his ongoing pain and fever        Problem List:   1. Gallstone pancreatitis:  On retrospect he reports that he has had \"heartburn\" for at least a year.  EUS 12/22 did not show CBD stones and he presumably passed stones so did not need ERCP.    - Had lap cholecystectomy 12/23 and cholangiogram negative.    - empiric abx with invanz (started 12/22- completed 5 days of abx 12/26)  for cholangitis  -NPO-see below     2. SIRS (fever , leukocytosis) 2/2 ongoing pancreatitis: pt continued to have LGF post op and persistent leukocytosis. Completed invanz for 5 days on 12/26. pro calcitonin is elevated but down trending. Given persistent pain, CT abd repeated on 12/29 showed persistent pancreatitis and significant inflammation which explains both his ongoing pain and fever   --switch to NPO for 24 hrs given ongoing pain   --IVF  --hold off abx   --incase of fever >101, would obtain blood cx and CT abd w/ contrast     DVT Prophylaxis: Pneumatic Compression Devices  Code Status: Full Code  Discharge Dispo: home when tolerating diet and if fevers resolve- maybe home in 1-2 days  Incidental Findings/ Discharge Issues: none        Interval History (Subjective):      Still having some epigastric and back pain but improving.    Passing flatus.  + BM  Having night sweats                   Physical Exam:    " "  Last Vital Signs:  /77 (BP Location: Right arm)  Pulse 94  Temp 99.9  F (37.7  C) (Oral)  Resp 16  Ht 1.88 m (6' 2\")  Wt 102 kg (224 lb 14.4 oz)  SpO2 97%  BMI 28.88 kg/m2      Vital signs reviewed  General:  Alert, calm, NAD  CV: regular rate and rhythm, no murmurs or rubs  Lungs:  Clear to ascultation bilaterally, normal respiratory effort  Abdomen:  Soft, diffuse tenderness- worse in epigastric area, nondistended, no masses, normal bowel sounds- tenderness improving  Extremities:  No edema  Neuro: normal strength and sensation in all 4 extremities, cranial nerves grossly intact  Psychiatric:  Mood and affect within normal limits             Medications:      All current medications were reviewed with changes reflected in problem list.         Data:      All new lab and imaging data was reviewed.   Labs:  Wbc 14.1 from 16.9    "

## 2017-12-28 NOTE — PLAN OF CARE
"Problem: Patient Care Overview  Goal: Plan of Care/Patient Progress Review  Outcome: No Change  /77 (BP Location: Right arm)  Pulse 94  Temp 99.9  F (37.7  C) (Oral)  Resp 16  Ht 1.88 m (6' 2\")  Wt 102 kg (224 lb 14.4 oz)  SpO2 97%  BMI 28.88 kg/m2  Neuro: A&O x4  Pain: c/o 5/10 pain in abdomen, reports relief with PRN oxycodone  Resp: LS clear, on RA, denies SOB  Cardiac: BP elevated but no need for PRN   GI/: bowel sounds positive, passing gas, had BM on NOC shift  Diet: tolerating low fat diet for breakfast, changed to NPO d/t swelling around pancrease  Skin/mobility: lap sites to abdomen CDI, trace edema to LE, small red rash to lower abdomen  Tx/plan:  NPO, IVF w/ K+, monitor temps  Will continue to monitor and provide supportive care.          "

## 2017-12-28 NOTE — PROGRESS NOTES
"[facility]   General Surgery Progress Note         Assessment and Plan:   Assessment:   -POD#5 s/p LAPAROSCOPIC CHOLECYSTECTOMY WITH CHOLANGIOGRAMS (negative)  -Tmax 100.9F, WBC 13.9  -Ileus as expected, resolved      Plan:   -CT pending to eval cause of fevers; Hospitalist involved  -Low-fat diet as tolerated  -IV antibiotics: Invanz completed on12/26  -Pain control: oxycodone  -Bowel regimen: senna-docusate BID  -VTE prophylaxis: PCDs  -Dispo: D/C pending further diet tolerance and resolution of fevers         Interval History:   Resting in bed. Still with fevers/chills.  Denies abdominal pain; did have some low pelvic soreness but this is better after +BM this morning.  Admits to soreness on each side of low back.  Breathing well and use of IS is stellar.  Tolerating diet.         Physical Exam:   Blood pressure 164/89, pulse 94, temperature 99.7  F (37.6  C), temperature source Oral, resp. rate 16, height 1.88 m (6' 2\"), weight 102 kg (224 lb 14.4 oz), SpO2 97 %.    I/O last 3 completed shifts:  In: 5002 [P.O.:2720; I.V.:2282]  Out: -   Tmax 100.9F    Abdomen:   soft, mildly-distended, nontender, normal bowel sounds   Incs - clean, dry, non-tender, steristrips intact, no erythema or sign of infection source.              Data:       Recent Labs  Lab 12/28/17  0751 12/27/17  0719 12/25/17  0730 12/23/17  0927   WBC 13.9* 15.1* 14.1* 16.9*   HGB 11.0* 11.1*  --  13.2*   HCT 32.4* 32.5*  --  39.3*   MCV 88 87  --  88    305  --  214       Recent Labs  Lab 12/28/17  0751 12/27/17  0719 12/24/17  0712   AST 41 36 90*   ALT 79* 89* 204*   ALKPHOS 214* 193* 186*   BILITOTAL 0.9 1.0 2.0*     Samina Wang PA-C     Pt seen and examined, agree with above  Elevated temps prompted CT, to my read it shows increased inflammation around the pancreas  He otherwise feels well with the exception of some early satiety  Okay for d/c once free of fevers.  "

## 2017-12-28 NOTE — PLAN OF CARE
Problem: Patient Care Overview  Goal: Plan of Care/Patient Progress Review  Outcome: No Change  Pt remains admitted for choledocholithiasis  Pt reported some pain during shift, oxy given x2 with relief  No nausea reported  Tmax of 100.8, declined intervention   Incisions clean dry and intact  K replaced on previous shift, recheck le el 3.6  Edema of +1 on ankels, encouraged to walk and elevate  Up independently   Low fat diet, tolerated well  Will continue to monitor

## 2017-12-29 LAB
ANION GAP SERPL CALCULATED.3IONS-SCNC: 9 MMOL/L (ref 3–14)
BUN SERPL-MCNC: 9 MG/DL (ref 7–30)
CALCIUM SERPL-MCNC: 8.3 MG/DL (ref 8.5–10.1)
CHLORIDE SERPL-SCNC: 104 MMOL/L (ref 94–109)
CO2 SERPL-SCNC: 25 MMOL/L (ref 20–32)
CREAT SERPL-MCNC: 0.69 MG/DL (ref 0.66–1.25)
ERYTHROCYTE [DISTWIDTH] IN BLOOD BY AUTOMATED COUNT: 14.1 % (ref 10–15)
GFR SERPL CREATININE-BSD FRML MDRD: >90 ML/MIN/1.7M2
GLUCOSE SERPL-MCNC: 92 MG/DL (ref 70–99)
HCT VFR BLD AUTO: 32.1 % (ref 40–53)
HGB BLD-MCNC: 10.4 G/DL (ref 13.3–17.7)
MCH RBC QN AUTO: 29.1 PG (ref 26.5–33)
MCHC RBC AUTO-ENTMCNC: 32.4 G/DL (ref 31.5–36.5)
MCV RBC AUTO: 90 FL (ref 78–100)
PLATELET # BLD AUTO: 408 10E9/L (ref 150–450)
POTASSIUM SERPL-SCNC: 4 MMOL/L (ref 3.4–5.3)
RBC # BLD AUTO: 3.58 10E12/L (ref 4.4–5.9)
SODIUM SERPL-SCNC: 138 MMOL/L (ref 133–144)
WBC # BLD AUTO: 13.8 10E9/L (ref 4–11)

## 2017-12-29 PROCEDURE — 99233 SBSQ HOSP IP/OBS HIGH 50: CPT | Performed by: HOSPITALIST

## 2017-12-29 RX ORDER — DEXTROSE MONOHYDRATE, SODIUM CHLORIDE, AND POTASSIUM CHLORIDE 50; 1.49; 4.5 G/1000ML; G/1000ML; G/1000ML
INJECTION, SOLUTION INTRAVENOUS CONTINUOUS
Status: DISCONTINUED | OUTPATIENT
Start: 2017-12-29 | End: 2017-12-31 | Stop reason: HOSPADM

## 2017-12-29 RX ORDER — KETOROLAC TROMETHAMINE 30 MG/ML
30 INJECTION, SOLUTION INTRAMUSCULAR; INTRAVENOUS EVERY 6 HOURS PRN
Status: DISCONTINUED | OUTPATIENT
Start: 2017-12-29 | End: 2017-12-31

## 2017-12-29 RX ADMIN — OXYCODONE HYDROCHLORIDE 10 MG: 5 TABLET ORAL at 08:46

## 2017-12-29 RX ADMIN — POTASSIUM CHLORIDE, DEXTROSE MONOHYDRATE AND SODIUM CHLORIDE: 150; 5; 450 INJECTION, SOLUTION INTRAVENOUS at 10:58

## 2017-12-29 RX ADMIN — SENNOSIDES AND DOCUSATE SODIUM 2 TABLET: 8.6; 5 TABLET ORAL at 20:44

## 2017-12-29 RX ADMIN — SENNOSIDES AND DOCUSATE SODIUM 2 TABLET: 8.6; 5 TABLET ORAL at 08:46

## 2017-12-29 RX ADMIN — POTASSIUM CHLORIDE AND SODIUM CHLORIDE 1000 ML: 900; 150 INJECTION, SOLUTION INTRAVENOUS at 00:49

## 2017-12-29 RX ADMIN — KETOROLAC TROMETHAMINE 30 MG: 30 INJECTION, SOLUTION INTRAMUSCULAR at 20:59

## 2017-12-29 RX ADMIN — PANTOPRAZOLE SODIUM 40 MG: 40 TABLET, DELAYED RELEASE ORAL at 08:46

## 2017-12-29 RX ADMIN — NICOTINE 1 PATCH: 21 PATCH, EXTENDED RELEASE TRANSDERMAL at 20:45

## 2017-12-29 RX ADMIN — OXYCODONE HYDROCHLORIDE 10 MG: 5 TABLET ORAL at 05:42

## 2017-12-29 RX ADMIN — KETOROLAC TROMETHAMINE 30 MG: 30 INJECTION, SOLUTION INTRAMUSCULAR at 13:07

## 2017-12-29 RX ADMIN — OXYCODONE HYDROCHLORIDE 10 MG: 5 TABLET ORAL at 01:21

## 2017-12-29 ASSESSMENT — PAIN DESCRIPTION - DESCRIPTORS
DESCRIPTORS: ACHING
DESCRIPTORS: ACHING

## 2017-12-29 ASSESSMENT — ACTIVITIES OF DAILY LIVING (ADL)
ADLS_ACUITY_SCORE: 9

## 2017-12-29 NOTE — PLAN OF CARE
Problem: Cholecystectomy (Adult)  Goal: Signs and Symptoms of Listed Potential Problems Will be Absent, Minimized or Managed (Cholecystectomy)  Signs and symptoms of listed potential problems will be absent, minimized or managed by discharge/transition of care (reference Cholecystectomy (Adult) CPG).   Outcome: Improving  Pt remains hospitalized for: POD #6 from Lap chol./ pancreatitis    Ambulatory Status:  Pt up ind.  Orientation: a/o  VS:  Tmax 99.0, Bp elevated   Pain:  abd pain -Oxycodone and Toradol given   Resp: LS dim.   GI:  denies nausea.  NPO with ice chips. +BS.  Passing flatus.  Large BM this shift.  :  Voiding without difficulty   Skin:  Lap sites steri-strips intact-bruising mid lower abd.   Tx:  Nicotine patch in place   Labs:  WBC 13.8  Consults:  GI  Disposition:  tbd

## 2017-12-29 NOTE — PLAN OF CARE
Problem: Patient Care Overview  Goal: Plan of Care/Patient Progress Review  Vitals stable, afebrile. Lap sites with steri-strips C/D/I. Bowel sounds present, passing flatus. Voiding adequately. Denies nausea, oxycodone effective for abdominal pain.

## 2017-12-29 NOTE — PROGRESS NOTES
"[facility]   General Surgery Progress Note         Assessment and Plan:   Assessment:   -POD#5 s/p LAPAROSCOPIC CHOLECYSTECTOMY WITH CHOLANGIOGRAMS (negative)  -Afebrile, WBC 13.8      Plan:   -NPO for ongoing pancreatitis  -IV antibiotics: Invanz completed on12/26  -Pain control: oxycodone  -Bowel regimen: senna-docusate BID  -VTE prophylaxis: PCDs  -IM managing medical issues, SIRS (fever, leukocytosis due to ongoing pancreatitis)  -Disposition: home when fevers resolve and tolerating a diet         Interval History:   Resting in bed. Still has had some fevers/chills/weats. No fever documented. Has epigastric pain and low back discomfort.  Breathing well and using IS. Now NPO except ice chips. States he is hungry. He is disappointed he has to stay in the hospital longer.         Physical Exam:   Blood pressure 151/81, pulse 94, temperature 99  F (37.2  C), temperature source Oral, resp. rate 18, height 1.88 m (6' 2\"), weight 99.9 kg (220 lb 3.2 oz), SpO2 97 %.    I/O last 3 completed shifts:  In: 1858 [I.V.:1858]  Out: -     Abdomen:   soft, mildly-distended, tenderness noted in epigstrum, normal bowel sounds   Incs - clean, dry, non-tender, steristrips intact, no erythema, +dependent ecchymosis below umbilical incision             Data:   CT 12/28/17 - IMPRESSION:   1. Extensive peritoneal inflammation that appears to be centered  around the pancreas that is likely out of proportion to what is  expected following cholecystectomy. This is concerning for  interstitial edematous pancreatitis. No hypoenhancement of the  pancreas. No loculated necrotic fluid collections are identified.  Small amount of free fluid in the peritoneum.  2. New postoperative changes of cholecystectomy with surgical clips in  the cholecystectomy bed. No suspicious fluid collections within the  cholecystectomy bed.  3. Moderate left pleural effusion with associated left basilar  atelectasis and/or pneumonia.    Recent Labs  Lab " 12/29/17  0747 12/28/17  0751 12/27/17  0719   WBC 13.8* 13.9* 15.1*   HGB 10.4* 11.0* 11.1*   HCT 32.1* 32.4* 32.5*   MCV 90 88 87    333 305       Recent Labs  Lab 12/28/17  0751 12/27/17  0719 12/24/17  0712   AST 41 36 90*   ALT 79* 89* 204*   ALKPHOS 214* 193* 186*   BILITOTAL 0.9 1.0 2.0*       Rodrick Chavis PA-C     Seen and agree,    Anthony Nuñez MD  [facility]

## 2017-12-29 NOTE — PROGRESS NOTES
"[facility]  Hospitalist Progress Note    Valarie Odom MD 12/29/2017  Text Page      Reason for Stay (Diagnosis): gallstone pancreatitis    Summary of Stay: Contreras Pearson is a 52 year old male admitted on 12/22/2017 with gallstone pancreatitis.  He was found to have acute pancreatitis and abnormal liver enzymes at admission.  EUS 12/22 revealed sludge in GB but CBD looked clear.  Had lap inocencio on 12/23 and recovered from ileus however conitnued to have abd pain and fevers. Repeat CT abd on 12/29 showed persistent pancreatitis and significant inflammation which explains both his ongoing pain and fever . Currently NPO and on IVF for pancreatitis        Assessment and Plan:      1. Gallstone pancreatitis:  On retrospect he reports that he has had \"heartburn\" for at least a year.  EUS 12/22 did not show CBD stones and he presumably passed stones so did not need ERCP.    - Had lap cholecystectomy 12/23 and cholangiogram negative.    - empiric abx with invanz (started 12/22- completed 5 days of abx 12/26)  for cholangitis  -NPO-see below     2. SIRS (fever , leukocytosis) 2/2 ongoing pancreatitis: pt continued to have LGF post op and persistent leukocytosis. Completed invanz for 5 days on 12/26. pro calcitonin is elevated but down trending. Given persistent pain, CT abd repeated on 12/29 showed persistent pancreatitis and significant inflammation which explains both his ongoing pain and fever   --switched to NPO for 24 hrs given ongoing pain   --IVF  --hold off abx   --pain control with IV dilaudid/ IV toradol prn oxycodone   --incase of fever >101, would obtain pan-culture     DVT Prophylaxis: Pneumatic Compression Devices  Code Status: Full Code  Discharge Dispo: home when tolerating diet and if fevers resolve- maybe home in 1-2 days  Incidental Findings/ Discharge Issues: none        Interval History (Subjective):      Continues to have pain and fevers  Feeling hungry                  Physical " "Exam:      Last Vital Signs:  /84 (BP Location: Right arm)  Pulse 94  Temp 98.9  F (37.2  C) (Oral)  Resp 18  Ht 1.88 m (6' 2\")  Wt 99.9 kg (220 lb 3.2 oz)  SpO2 97%  BMI 28.27 kg/m2      Vital signs reviewed  General:  Alert, calm, NAD  CV: regular rate and rhythm, no murmurs or rubs  Lungs:  Clear to ascultation bilaterally, normal respiratory effort  Abdomen:  Soft, diffuse tenderness- worse in epigastric area but diffuse otherwise, nondistended, no masses, normal bowel sounds   Extremities:  No edema  Neuro: normal strength and sensation in all 4 extremities, cranial nerves grossly intact  Psychiatric:  Mood and affect within normal limits             Medications:      All current medications were reviewed with changes reflected in problem list.         Data:      All new lab and imaging data was reviewed.   Labs:  Wbc 14.1 from 16.9    "

## 2017-12-29 NOTE — PLAN OF CARE
Problem: Patient Care Overview  Goal: Plan of Care/Patient Progress Review  Outcome: No Change  Pt remains admitted for gallstone/pancreatitis  POD 5  Incisions clean dry and intact  Reported pain during shift, oxy given x3  No nausea reported  Tmax 99  Rash present on lower abdomen, no interventions taken  Up independent  NPO except ice and meds  Will continue to monitor

## 2017-12-29 NOTE — PROGRESS NOTES
SPIRITUAL HEALTH SERVICES Progress Note  [facility] 5    Visited pt Contreras per his length of stay.  Oriented him to  services.  He acknowledged the availability of  support but expressed no needs.  Contreras named his wife Selam, his two sons, and a friend among those within his support [employer].  He acknowledged feeling disappointed that he will need to be NPO for a few days post lap cholecystectomy.    Plan: No further plans; I and other chaplains remain available per pt/family request.    Nelson Bell M.Div., [facility]  Staff   Pager [phone]

## 2017-12-30 LAB
ANION GAP SERPL CALCULATED.3IONS-SCNC: 7 MMOL/L (ref 3–14)
BUN SERPL-MCNC: 13 MG/DL (ref 7–30)
CALCIUM SERPL-MCNC: 8.3 MG/DL (ref 8.5–10.1)
CHLORIDE SERPL-SCNC: 105 MMOL/L (ref 94–109)
CO2 SERPL-SCNC: 26 MMOL/L (ref 20–32)
CREAT SERPL-MCNC: 0.69 MG/DL (ref 0.66–1.25)
ERYTHROCYTE [DISTWIDTH] IN BLOOD BY AUTOMATED COUNT: 13.9 % (ref 10–15)
GFR SERPL CREATININE-BSD FRML MDRD: >90 ML/MIN/1.7M2
GLUCOSE SERPL-MCNC: 139 MG/DL (ref 70–99)
HCT VFR BLD AUTO: 32.6 % (ref 40–53)
HGB BLD-MCNC: 10.8 G/DL (ref 13.3–17.7)
LIPASE SERPL-CCNC: 287 U/L (ref 73–393)
MCH RBC QN AUTO: 29.4 PG (ref 26.5–33)
MCHC RBC AUTO-ENTMCNC: 33.1 G/DL (ref 31.5–36.5)
MCV RBC AUTO: 89 FL (ref 78–100)
PLATELET # BLD AUTO: 456 10E9/L (ref 150–450)
POTASSIUM SERPL-SCNC: 4.1 MMOL/L (ref 3.4–5.3)
RBC # BLD AUTO: 3.67 10E12/L (ref 4.4–5.9)
SODIUM SERPL-SCNC: 138 MMOL/L (ref 133–144)
WBC # BLD AUTO: 8.9 10E9/L (ref 4–11)

## 2017-12-30 PROCEDURE — 99233 SBSQ HOSP IP/OBS HIGH 50: CPT | Performed by: INTERNAL MEDICINE

## 2017-12-30 RX ORDER — AMOXICILLIN 250 MG
1-2 CAPSULE ORAL 2 TIMES DAILY PRN
Status: DISCONTINUED | OUTPATIENT
Start: 2017-12-30 | End: 2017-12-31 | Stop reason: HOSPADM

## 2017-12-30 RX ADMIN — POTASSIUM CHLORIDE, DEXTROSE MONOHYDRATE AND SODIUM CHLORIDE: 150; 5; 450 INJECTION, SOLUTION INTRAVENOUS at 14:18

## 2017-12-30 RX ADMIN — PANTOPRAZOLE SODIUM 40 MG: 40 TABLET, DELAYED RELEASE ORAL at 08:22

## 2017-12-30 RX ADMIN — KETOROLAC TROMETHAMINE 30 MG: 30 INJECTION, SOLUTION INTRAMUSCULAR at 23:48

## 2017-12-30 RX ADMIN — KETOROLAC TROMETHAMINE 30 MG: 30 INJECTION, SOLUTION INTRAMUSCULAR at 14:30

## 2017-12-30 RX ADMIN — NICOTINE 1 PATCH: 21 PATCH, EXTENDED RELEASE TRANSDERMAL at 21:15

## 2017-12-30 RX ADMIN — POTASSIUM CHLORIDE, DEXTROSE MONOHYDRATE AND SODIUM CHLORIDE: 150; 5; 450 INJECTION, SOLUTION INTRAVENOUS at 23:46

## 2017-12-30 RX ADMIN — KETOROLAC TROMETHAMINE 30 MG: 30 INJECTION, SOLUTION INTRAMUSCULAR at 05:20

## 2017-12-30 ASSESSMENT — PAIN DESCRIPTION - DESCRIPTORS
DESCRIPTORS: ACHING

## 2017-12-30 ASSESSMENT — ACTIVITIES OF DAILY LIVING (ADL)
ADLS_ACUITY_SCORE: 9

## 2017-12-30 NOTE — PLAN OF CARE
Problem: Patient Care Overview  Goal: Plan of Care/Patient Progress Review  Outcome: No Change  Pt medicated x1 for abdominal pain. Bowel sounds active, passing flatus, states had 1 loose stool this AM. Independent in room. Afebrile.

## 2017-12-30 NOTE — PROGRESS NOTES
"[facility]  Hospitalist Progress Note  Marcin Castellanos MD    Reason for Stay (Diagnosis): gallstone pancreatitis    Summary of Stay: Contreras Pearson is a 52 year old male admitted on 12/22/2017 with gallstone pancreatitis.  He was found to have acute pancreatitis and abnormal liver enzymes at admission.  EUS 12/22 revealed sludge in GB but CBD looked clear.  Had lap inocencio on 12/23 and recovered from ileus however conitnued to have abd pain and fevers. Repeat CT abd on 12/29 showed persistent pancreatitis and significant inflammation which explains both his ongoing pain and fever .     He was kept NPO and managed with IVF and pain control.  He now is slowly advancing his diet and clinically improving.         Assessment and Plan:      1. Gallstone pancreatitis:  On retrospect he reports that he has had \"heartburn\" for at least a year which may have been more mild bouts.  EUS 12/22 did not show CBD stones and he presumably passed stones so did not need ERCP.    - Had lap cholecystectomy 12/23 and cholangiogram negative.    - empiric abx with invanz (started 12/22- completed 5 days of abx 12/26)  for cholangitis  -advanced to clear liquids.  If doing well this evening may try small amounts of full liquids.    2. SIRS (fever , leukocytosis) 2/2 ongoing pancreatitis: pt continued to have LGF post op and persistent leukocytosis. Completed invanz for 5 days on 12/26. pro calcitonin is elevated but down trending. Given persistent pain, CT abd repeated on 12/29 showed persistent pancreatitis and significant inflammation which explains both his ongoing pain and fever   --advanced to clears, tolerating well.    --IVF   --hold off abx   --pain control with IV dilaudid/ IV toradol prn oxycodone, wean off IV meds as able.  --in case of fever >101, would obtain pan-culture     DVT Prophylaxis: Pneumatic Compression Devices  Code Status: Full Code  Discharge Dispo: home when tolerating diet and if fevers " "resolve- maybe home in 1-2 days  Incidental Findings/ Discharge Issues: none        Interval History (Subjective):      Tolerating clear liquids  Feeling hungry  No fevers  Wants to advance diet further.  ?fulls tonight  Pain 1/10  Weaning off IV pain meds                    Physical Exam:      Last Vital Signs:  /90 (BP Location: Right arm)  Pulse 94  Temp 96  F (35.6  C) (Oral)  Resp 18  Ht 1.88 m (6' 2\")  Wt 100 kg (220 lb 8 oz)  SpO2 99%  BMI 28.31 kg/m2      Vital signs reviewed  General:  Alert, calm, NAD  CV: regular rate and rhythm, no murmurs or rubs  Lungs:  Clear to ascultation bilaterally, normal respiratory effort  Abdomen:  Soft, only tenderness is in epigastric area, nondistended, no masses, normal bowel sounds   Extremities:  No edema  Neuro: normal strength and sensation in all 4 extremities, cranial nerves grossly intact  Psychiatric:  Mood and affect within normal limits             Medications:      All current medications were reviewed with changes reflected in problem list.         Data:      All new lab and imaging data was reviewed.   Labs:  Last Basic Metabolic Panel:  Lab Results   Component Value Date     12/30/2017      Lab Results   Component Value Date    POTASSIUM 4.1 12/30/2017     Lab Results   Component Value Date    CHLORIDE 105 12/30/2017     Lab Results   Component Value Date    MARLENE 8.3 12/30/2017     Lab Results   Component Value Date    CO2 26 12/30/2017     Lab Results   Component Value Date    BUN 13 12/30/2017     Lab Results   Component Value Date    CR 0.69 12/30/2017     Lab Results   Component Value Date     12/30/2017       "

## 2017-12-30 NOTE — PLAN OF CARE
Problem: Patient Care Overview  Goal: Plan of Care/Patient Progress Review  Outcome: No Change  Pt remains admitted for gallstone and pancreatitis  Pt reported some pain during shift, IV tordol given with relief  No nausea reported  Incisions clean dry and intact  Tmax 98.2  Up independenly   NPO except ice and meds  Will continue to moniotr

## 2017-12-30 NOTE — PROGRESS NOTES
[facility]-[place]  POD#6 s/p lap inocencio for biliary pancreatitis  Severe pancreatitis by CT two days ago  Feels his pain is improved since then, WBC down to 8.9K, lipase 287  Having flatus and diarrhea  Tmax 99  NAD, no icterus  Abd soft, Steris dry, bowel tones active, no focal tenderness  Improving  Should be okay for clears today  Follow clinically for resolution of pancreatitis  Not ready for d/c today

## 2017-12-31 VITALS
WEIGHT: 218.5 LBS | BODY MASS INDEX: 28.04 KG/M2 | DIASTOLIC BLOOD PRESSURE: 83 MMHG | HEIGHT: 74 IN | RESPIRATION RATE: 22 BRPM | OXYGEN SATURATION: 98 % | TEMPERATURE: 97.5 F | HEART RATE: 72 BPM | SYSTOLIC BLOOD PRESSURE: 135 MMHG

## 2017-12-31 PROCEDURE — 99239 HOSP IP/OBS DSCHRG MGMT >30: CPT | Performed by: INTERNAL MEDICINE

## 2017-12-31 RX ORDER — OXYCODONE HYDROCHLORIDE 5 MG/1
5 TABLET ORAL EVERY 4 HOURS PRN
Qty: 18 TABLET | Refills: 0 | Status: SHIPPED | OUTPATIENT
Start: 2017-12-31 | End: 2022-02-22

## 2017-12-31 RX ORDER — OXYCODONE HYDROCHLORIDE 5 MG/1
5 TABLET ORAL EVERY 4 HOURS PRN
Qty: 18 TABLET | Refills: 0 | Status: SHIPPED | OUTPATIENT
Start: 2017-12-31 | End: 2017-12-31

## 2017-12-31 RX ORDER — ACETAMINOPHEN 325 MG/1
650 TABLET ORAL EVERY 4 HOURS PRN
Qty: 100 TABLET
Start: 2017-12-31

## 2017-12-31 RX ADMIN — ACETAMINOPHEN 650 MG: 325 TABLET, FILM COATED ORAL at 14:36

## 2017-12-31 RX ADMIN — PANTOPRAZOLE SODIUM 40 MG: 40 TABLET, DELAYED RELEASE ORAL at 09:23

## 2017-12-31 ASSESSMENT — ACTIVITIES OF DAILY LIVING (ADL)
ADLS_ACUITY_SCORE: 9

## 2017-12-31 ASSESSMENT — PAIN DESCRIPTION - DESCRIPTORS: DESCRIPTORS: ACHING

## 2017-12-31 NOTE — PLAN OF CARE
Problem: Patient Care Overview  Goal: Plan of Care/Patient Progress Review  Outcome: Improving  Ambulatory Status:  Pt up ind.  Orientation: a/o  VS:  VSS  Pain:  abd discomfort-declines any interventions  Resp: LS clear.   GI:  denies nausea.  Ai. Low fat diet. +BS.  Passing flatus.  Last BM 12/29.  :  Voiding without difficulty   Skin:  Lap sites-steri strips intact  Consults:  GI  Disposition:  Possible d/c this evening

## 2017-12-31 NOTE — PLAN OF CARE
Problem: Patient Care Overview  Goal: Plan of Care/Patient Progress Review  Outcome: Improving  Pain managed with IV toradol x1. Passing flatus, bowel sounds active. independent in room

## 2017-12-31 NOTE — PROGRESS NOTES
[facility]-[place]  POD#7 s/p lap inocencio for biliary pancreatitis  Post op pancreatitis symptoms seem improved, tolerated full liquids last evening, excited about the potential to discharge  No fevers, P73  1140po  NAD, up in room  Abd benign, Steris dry, bowel tones active throughout  Should be appropriate for d/c later today

## 2017-12-31 NOTE — PLAN OF CARE
Problem: Cholecystectomy (Adult)  Goal: Signs and Symptoms of Listed Potential Problems Will be Absent, Minimized or Managed (Cholecystectomy)  Signs and symptoms of listed potential problems will be absent, minimized or managed by discharge/transition of care (reference Cholecystectomy (Adult) CPG).   Outcome: Improving  Bowel sounds present, passing gas. No BM. Tolerating full liquids. No c/o pain. No PRN medications given. Vitally stable. Nicotine patch in place.

## 2017-12-31 NOTE — PROGRESS NOTES
Reviewed discharge instructions and medications with patient. Questions answered. Patient discharged to home with spouse, discharge instructions, medications (oxycodone prescrition ), and belongings at this time.

## 2018-03-10 NOTE — PLAN OF CARE
Problem: Cholecystectomy (Adult)  Goal: Signs and Symptoms of Listed Potential Problems Will be Absent, Minimized or Managed (Cholecystectomy)  Signs and symptoms of listed potential problems will be absent, minimized or managed by discharge/transition of care (reference Cholecystectomy (Adult) CPG).   Outcome: No Change  Ambulatory Status:  Pt up ind.  Orientation: a/o  VS:  Bp elevated   Pain:  abd discomfort-declines any medical interventions   Resp: LS clear  GI:  denies nausea. Ai clear liquids. +BS.  Passing flatus.   :  Voiding without difficulty   Skin:  Lap sites steri-strips intact-bruising mid lower abd.   Tx:  Nicotine patch in place   Labs:  [id number]  Consults:  GI  Disposition:  tbd

## 2019-10-01 NOTE — DISCHARGE SUMMARY
"[facility]  Discharge Summary  Name: Contreras Pearson    MRN: [id number]  YOB: 1965    Age: 52 year old  Date of Discharge:  12/31/2017  Date of Admission: 12/22/2017  Primary Care Provider: Radha Sánchez  Discharge Physician:  Kimani Castellanos MD  Discharging Service:  Hospitalist      Hospital Course/Discharge Diagnoses:  Contreras Pearson is a 52 year old male admitted on 12/22/2017 with gallstone pancreatitis.  He was found to have acute pancreatitis and abnormal liver enzymes at admission.  EUS 12/22 revealed sludge in GB but CBD looked clear.  He initially felt improved and underwent lap inocencio on 12/23 and recovered from ileus however conitnued to have abdominal pain and fevers with generally slow improvement.  Due to worsening pain and ongoing fevers he had to be made NPO and required ongoing fluids and iV pain control and eventually he had repeat CT abdomen on 12/29 which showed persistent pancreatitis and significant inflammation which was the evident cause of both his ongoing pain and fever. Since that time he has been slowly advancing his diet and clinically improving.  He has been advanced to a low fat diet as of today and denies any increase or return of significant pain or discomfort.  He has been having bowel movements and feels hungry and is eager to discharge home.  I've recommended he follow a low fat diet with 5-6 small feedings per day with focus on maintaining ongoing hydration.  We've discussed follow up with either General Surgery clinic and/or his PCP if he has any ongoing symptoms as there is risk for developing a cyst/pseudocyst/abscess though currently there is no immediate indication for repeating imaging as he looks and feels quite well with an abdominal exam.     1.  Gallstone pancreatitis:  On retrospect he reports that he has had \"heartburn\" for at least a year which may have been more mild bouts of pancreatitis.  EUS 12/22 did not show CBD " stones and he presumably passed stones so did not need ERCP.    - Had lap cholecystectomy 12/23 and cholangiogram negative.    - empiric abx with invanz (started 12/22- completed 5 days of abx 12/26)  for cholangitis  -advanced to low fat diet.     2. SIRS (fever , leukocytosis) 2/2 ongoing but resolving pancreatitis: pt continued to have low grade fever post op and persistent leukocytosis. Completed invanz for 5 days on 12/26. pro calcitonin was elevated but down trending. Given persistent pain, CT abd repeated on 12/29 showed persistent pancreatitis and significant inflammation though no other clear complication.        Discharge Disposition:  Discharged to home     Allergies:  Allergies   Allergen Reactions     Penicillins      Sulfa Drugs         Discharge Medications:   Current Discharge Medication List      START taking these medications    Details   oxyCODONE IR (ROXICODONE) 5 MG tablet Take 1 tablet (5 mg) by mouth every 4 hours as needed for moderate to severe pain  Qty: 18 tablet, Refills: 0    Associated Diagnoses: Gallstone pancreatitis      acetaminophen (TYLENOL) 325 MG tablet Take 2 tablets (650 mg) by mouth every 4 hours as needed for other (surgical pain)  Qty: 100 tablet    Associated Diagnoses: Gallstone pancreatitis         CONTINUE these medications which have NOT CHANGED    Details   SUCRALFATE PO Take 1 g by mouth 4 times daily       PANTOPRAZOLE SODIUM PO Take 40 mg by mouth every morning (before breakfast)               Condition on Discharge:  Discharge condition: Stable   Discharge vitals: Blood pressure 135/83, pulse 72, temperature 97.5  F (36.4  C), temperature source Oral, resp. rate 22, height 1.88 m (6' 2\"), weight 99.1 kg (218 lb 8 oz), SpO2 98 %.   Code status on discharge: Full Code     History of Illness:  See detailed admission note for full details.    Physical Exam:  Blood pressure 135/83, pulse 72, temperature 97.5  F (36.4  C), temperature source Oral, resp. rate 22, height " "1.88 m (6' 2\"), weight 99.1 kg (218 lb 8 oz), SpO2 98 %.  Wt Readings from Last 1 Encounters:   12/31/17 99.1 kg (218 lb 8 oz)     General: Alert, awake, no acute distress.  HEENT: NC/AT, eyes anicteric, external occular movements intact, face symmetric.  Dentition WNL, MM moist.  Cardiac: RRR, S1, S2.  No murmurs appreciated.  Pulmonary: Normal chest rise, normal work of breathing.  Lungs CTA BL  Abdomen: soft, non-tender, non-distended.  Bowel Sounds Present.  No guarding.  Surgical wounds well approximated.  Only mild epigastric discomfort on deep palpation.  Benign.  Extremities: no deformities.  Warm, well perfused.  Skin: no rashes or lesions noted.  Warm and Dry.  Neuro: No focal deficits noted.  Speech clear.  Coordination and strength grossly normal.  Psych: Appropriate affect.    Procedures other than Imaging:  Cholecystectomy  EUS     Imaging:  Results for orders placed or performed during the hospital encounter of 12/22/17   XR Surgery MYRTLE L/T 5 Min Fluoro w Stills    Narrative    SURGERY C-ARM FLUOROSCOPY LESS THAN 5 MINUTES WITH STILLS 12/23/2017  3:17 PM     COMPARISON: None.    HISTORY: Laparoscopic cholecystectomy.    NUMBER OF IMAGES ACQUIRED: 3    VIEWS: 1    FLUOROSCOPY TIME: .2 minutes.      Impression    IMPRESSION: Contrast has been injected into the cystic duct remnant  and opacifies intrahepatic and extrahepatic bile ducts. The bile ducts  are of normal caliber and morphology with no evidence for retained  stones or obstruction. Contrast flows freely into the duodenum. Distal  pancreatic duct is partially visualized. Findings relayed to Dr. Duvall in the operating room.     PAT IRIZARRY MD   CT Abdomen Pelvis w Contrast    Narrative    CT ABDOMEN AND PELVIS WITH CONTRAST   12/28/2017 11:01 AM     HISTORY: Status post cholecystectomy, now with fevers and abdomen  pain.    TECHNIQUE:  100 mL Isovue-370. Radiation dose for this scan was  reduced using automated exposure control, " adjustment of the mA and/or  kV according to patient size, or iterative reconstruction technique.    COMPARISON: CT abdomen and pelvis 12/20/2017.    FINDINGS: There is extensive peritoneal inflammation which appears to  be centered around the pancreas. Diffuse mesenteric stranding and  prominent mesenteric lymph nodes are seen. This is out of proportion  to what is expected to be seen following cholecystectomy. Mild  layering peritoneal fluid within the low pelvis. No loculated fluid  collections or abscesses are identified at this time. The pancreas  demonstrates diffuse mild enhancement without hypoenhancing dissection  identified.    The splenic vein and main portal vein appear patent.    Postoperative changes of cholecystectomy with cholecystectomy clips.  No suspicious loculated fluid collection within the cholecystectomy  bed. The common bile duct is mildly enlarged, not unexpected given  cholecystectomy. No intrahepatic ductal dilatation.    No suspicious liver masses. Nongeographic area of hypoenhancement  adjacent to the falciform ligament presumably represents an area of  focal fatty infiltration given its location.    The spleen, kidneys, and adrenal glands are unremarkable. No dilated  loops of bowel to suggest bowel obstruction. Appendix appears normal.    Diffuse soft tissue anasarca. Small fat-containing umbilical hernia.    No suspicious osseous lesions.    Moderate left pleural effusion with associated atelectasis and/or  pneumonia.      Impression    IMPRESSION:   1. Extensive peritoneal inflammation that appears to be centered  around the pancreas that is likely out of proportion to what is  expected following cholecystectomy. This is concerning for  interstitial edematous pancreatitis. No hypoenhancement of the  pancreas. No loculated necrotic fluid collections are identified.  Small amount of free fluid in the peritoneum.  2. New postoperative changes of cholecystectomy with surgical clips  in  the cholecystectomy bed. No suspicious fluid collections within the  cholecystectomy bed.  3. Moderate left pleural effusion with associated left basilar  atelectasis and/or pneumonia.    MILA MORROW MD        Consultations:  GI  General Surgery.       Recent Lab Results:    Recent Labs  Lab 12/30/17 0628 12/29/17 0747 12/28/17  0751   WBC 8.9 13.8* 13.9*   HGB 10.8* 10.4* 11.0*   HCT 32.6* 32.1* 32.4*   MCV 89 90 88   * 408 333       Recent Labs  Lab 12/30/17  0628 12/29/17  0747 12/28/17  0751  12/27/17  0719    138 137  --  136   POTASSIUM 4.1 4.0 3.6  < > 3.1*   CHLORIDE 105 104 103  --  102   CO2 26 25 24  --  29   ANIONGAP 7 9 10  --  5   * 92 120*  --  97   BUN 13 9 8  --  9   CR 0.69 0.69 0.65*  --  0.63*   GFRESTIMATED >90 >90 >90  --  >90   GFRESTBLACK >90 >90 >90  --  >90   MARLENE 8.3* 8.3* 8.2*  --  8.1*   PROTTOTAL  --   --  6.3*  --  6.1*   ALBUMIN  --   --  2.4*  --  2.3*   BILITOTAL  --   --  0.9  --  1.0   ALKPHOS  --   --  214*  --  193*   AST  --   --  41  --  36   ALT  --   --  79*  --  89*   < > = values in this interval not displayed.       Pending Results:    Unresulted Labs Ordered in the Past 30 Days of this Admission     No orders found from 10/23/2017 to 12/23/2017.           Discharge Instructions and Follow-Up:     Discharge Procedure Orders  Reason for your hospital stay   Order Comments: You were admitted for gall stone pancreatitis and did undergo cholecystectomy during your stay.  You should continue to push fluids and maintain a low fat diet at home.     Follow-up and recommended labs and tests    Order Comments: Follow up with General Surgery as directed for hospital follow up.  Do not return to work for at least one week or as directed by your Surgeon.     Activity   Order Comments: Your activity upon discharge: gradually resume usual activity as tolerated but do not lift more than 15 pounds for the next week or operate machinery or drive if you are still  requiring narcotic pain medications.   Order Specific Question Answer Comments   Is discharge order? Yes      Full Code     Diet   Order Comments: Follow this diet upon discharge: Orders Placed This Encounter     Low Fat Diet   Order Specific Question Answer Comments   Is discharge order? Yes          Total time spent in face to face contact with the patient and coordinating discharge was:  50 Minutes.

## 2022-02-08 DIAGNOSIS — Z11.59 ENCOUNTER FOR SCREENING FOR OTHER VIRAL DISEASES: Primary | ICD-10-CM

## 2022-02-22 ENCOUNTER — OFFICE VISIT (OUTPATIENT)
Dept: FAMILY MEDICINE | Facility: CLINIC | Age: 57
End: 2022-02-22
Payer: COMMERCIAL

## 2022-02-22 VITALS
RESPIRATION RATE: 16 BRPM | HEIGHT: 74 IN | BODY MASS INDEX: 30.16 KG/M2 | OXYGEN SATURATION: 99 % | SYSTOLIC BLOOD PRESSURE: 148 MMHG | TEMPERATURE: 98 F | DIASTOLIC BLOOD PRESSURE: 78 MMHG | WEIGHT: 235 LBS | HEART RATE: 76 BPM

## 2022-02-22 DIAGNOSIS — K64.9 BLEEDING HEMORRHOIDS: ICD-10-CM

## 2022-02-22 DIAGNOSIS — Z01.818 PRE-OPERATIVE GENERAL PHYSICAL EXAMINATION: Primary | ICD-10-CM

## 2022-02-22 LAB
ERYTHROCYTE [DISTWIDTH] IN BLOOD BY AUTOMATED COUNT: 14 % (ref 10–15)
HCT VFR BLD AUTO: 43.6 % (ref 40–53)
HGB BLD-MCNC: 14.7 G/DL (ref 13.3–17.7)
MCH RBC QN AUTO: 29.7 PG (ref 26.5–33)
MCHC RBC AUTO-ENTMCNC: 33.7 G/DL (ref 31.5–36.5)
MCV RBC AUTO: 88 FL (ref 78–100)
PLATELET # BLD AUTO: 252 10E3/UL (ref 150–450)
RBC # BLD AUTO: 4.95 10E6/UL (ref 4.4–5.9)
WBC # BLD AUTO: 6.8 10E3/UL (ref 4–11)

## 2022-02-22 PROCEDURE — 99203 OFFICE O/P NEW LOW 30 MIN: CPT | Performed by: FAMILY MEDICINE

## 2022-02-22 PROCEDURE — 80048 BASIC METABOLIC PNL TOTAL CA: CPT | Performed by: FAMILY MEDICINE

## 2022-02-22 PROCEDURE — 85027 COMPLETE CBC AUTOMATED: CPT | Performed by: FAMILY MEDICINE

## 2022-02-22 PROCEDURE — 36415 COLL VENOUS BLD VENIPUNCTURE: CPT | Performed by: FAMILY MEDICINE

## 2022-02-22 RX ORDER — SILDENAFIL 100 MG/1
100 TABLET, FILM COATED ORAL
COMMUNITY
Start: 2021-11-30

## 2022-02-22 NOTE — PATIENT INSTRUCTIONS
Do not take ibuprofen, naproxen, or aspirin for 7 days before upcoming surgery. Use acetaminophen instead for pain relief if needed. I will review your studies via [employer] when they are available. If you have any questions or concerns please let me know via [employer], or call the clinic.    Preparing for Your Surgery  Getting started  A nurse will call you to review your health history and instructions. They will give you an arrival time based on your scheduled surgery time. Please be ready to share:    Your doctor's clinic name and phone number    Your medical, surgical and anesthesia history    A list of allergies and sensitivities    A list of medicines, including herbal treatments and over-the-counter drugs    Whether the patient has a legal guardian (ask how to send us the papers in advance)  Please tell us if you're pregnant--or if there's any chance you might be pregnant. Some surgeries may injure a fetus (unborn baby), so they require a pregnancy test. Surgeries that are safe for a fetus don't always need a test, and you can choose whether to have one.   If you have a child who's having surgery, please ask for a copy of Preparing for Your Child's Surgery.    Preparing for surgery    Within 30 days of surgery: Have a pre-op exam (sometimes called an H&P, or History and Physical). This can be done at a clinic or pre-operative center.  ? If you're having a , you may not need this exam. Talk to your care team.    At your pre-op exam, talk to your care team about all medicines you take. If you need to stop any medicines before surgery, ask when to start taking them again.  ? We do this for your safety. Many medicines can make you bleed too much during surgery. Some change how well surgery (anesthesia) drugs work.    Call your insurance company to let them know you're having surgery. (If you don't have insurance, call [phone].)    Call your clinic if there's any change in your health. This includes  signs of a cold or flu (sore throat, runny nose, cough, rash, fever). It also includes a scrape or scratch near the surgery site.    If you have questions on the day of surgery, call your hospital or surgery center.  COVID testing  You may need to be tested for COVID-19 before having surgery. If so, your surgical team will give you instructions for scheduling this test, separate from your preoperative history and physical.  Eating and drinking guidelines  For your safety: Unless your surgeon tells you otherwise, follow the guidelines below.    Eat and drink as usual until 8 hours before surgery. After that, no food or milk.    Drink clear liquids until 2 hours before surgery. These are liquids you can see through, like water, Gatorade and Propel Water. You may also have black coffee and tea (no cream or milk).    Nothing by mouth within 2 hours of surgery. This includes gum, candy and breath mints.    If you drink alcohol: Stop drinking it the night before surgery.    If your care team tells you to take medicine on the morning of surgery, it's okay to take it with a sip of water.  Preventing infection    Shower or bathe the night before and morning of your surgery. Follow the instructions your clinic gave you. (If no instructions, use regular soap.)    Don't shave or clip hair near your surgery site. We'll remove the hair if needed.    Don't smoke or vape the morning of surgery. You may chew nicotine gum up to 2 hours before surgery. A nicotine patch is okay.  ? Note: Some surgeries require you to completely quit smoking and nicotine. Check with your surgeon.    Your care team will make every effort to keep you safe from infection. We will:  ? Clean our hands often with soap and water (or an alcohol-based hand rub).  ? Clean the skin at your surgery site with a special soap that kills germs.  ? Give you a special gown to keep you warm. (Cold raises the risk of infection.)  ? Wear special hair covers, masks, gowns  and gloves during surgery.  ? Give antibiotic medicine, if prescribed. Not all surgeries need antibiotics.  What to bring on the day of surgery    Photo ID and insurance card    Copy of your health care directive, if you have one    Glasses and hearing aides (bring cases)  ? You can't wear contacts during surgery    Inhaler and eye drops, if you use them (tell us about these when you arrive)    CPAP machine or breathing device, if you use them    A few personal items, if spending the night    If you have . . .  ? A pacemaker, ICD (cardiac defibrillator) or other implant: Bring the ID card.  ? An implanted stimulator: Bring the remote control.  ? A legal guardian: Bring a copy of the certified (court-stamped) guardianship papers.  Please remove any jewelry, including body piercings. Leave jewelry and other valuables at home.  If you're going home the day of surgery    You must have a responsible adult drive you home. They should stay with you overnight as well.    If you don't have someone to stay with you, and you aren't safe to go home alone, we may keep you overnight. Insurance often won't pay for this.  After surgery  If it's hard to control your pain or you need more pain medicine, please call your surgeon's office.  Questions?   If you have any questions for your care team, list them here: _________________________________________________________________________________________________________________________________________________________________________ ____________________________________ ____________________________________ ____________________________________  For informational purposes only. Not to replace the advice of your health care provider. Copyright   2003, 2019 [facility]. All rights reserved. Clinically reviewed by Lucia Vergara MD. [employer] [id number] - REV 07/21.

## 2022-02-22 NOTE — PROGRESS NOTES
[facility]  [address] [address] [address]  Phone: [phone]  Primary Provider: Radha Sánchez  Pre-op Performing Provider: DEANN LOPEZ      PREOPERATIVE EVALUATION:  Today's date: 2/22/2022    Contreras Pearson is a 56 year old male who presents for a preoperative evaluation.    Surgical Information:  Surgery/Procedure: HEMORRHOIDECTOMY and COLONOSCOPY  Surgery Location: [facility]  Surgeon: Dr. Hughes  Surgery Date: 3/2/22  Time of Surgery: 12:30PM  Where patient plans to recover: At home with family  Fax number for surgical facility: Note does not need to be faxed, will be available electronically in Epic.    Type of Anesthesia Anticipated: Monitor Anesthesia Care    Assessment & Plan   See after visit summary and result note from studies for helpful information and advice given to patient.    The proposed surgical procedure is considered INTERMEDIATE risk.    Pre-operative general physical examination  Patient cleared for upcoming surgery.  - Basic metabolic panel    Bleeding hemorrhoids  To be addressed by upcoming surgery.  - CBC with platelets      Possible Sleep Apnea: Patient states he snores, but denies having any daytime drowsiness.             RECOMMENDATION:  APPROVAL GIVEN to proceed with proposed procedure, without further diagnostic evaluation.                      Subjective     HPI related to upcoming procedure: Patient has long-term history of bleeding hemorrhoids which are problematic on a daily basis.  He is going to have the condition addressed by upcoming surgery.    Preop Questions 2/22/2022   1. Have you ever had a heart attack or stroke? No   2. Have you ever had surgery on your heart or blood vessels, such as a stent placement, a coronary artery bypass, or surgery on an artery in your head, neck, heart, or legs? No   3. Do you have chest pain with activity? No   4. Do you have a history of  heart failure? No   5. Do you currently have a cold,  bronchitis or symptoms of other infection? No   6. Do you have a cough, shortness of breath, or wheezing? YES - Related to smoking.   7. Do you or anyone in your family have previous history of blood clots? No   8. Do you or does anyone in your family have a serious bleeding problem such as prolonged bleeding following surgeries or cuts? No   9. Have you ever had problems with anemia or been told to take iron pills? No   10. Have you had any abnormal blood loss such as black, tarry or bloody stools? No   11. Have you ever had a blood transfusion? No   12. Are you willing to have a blood transfusion if it is medically needed before, during, or after your surgery? Yes   13. Have you or any of your relatives ever had problems with anesthesia? No   14. Do you have sleep apnea, excessive snoring or daytime drowsiness? YES - Snores   14a. Do you have a CPAP machine? No   15. Do you have any artifical heart valves or other implanted medical devices like a pacemaker, defibrillator, or continuous glucose monitor? No   16. Do you have artificial joints? No   17. Are you allergic to latex? No       Health Care Directive:  Patient does not have a Health Care Directive or Living Will: Discussed advance care planning with patient; however, patient declined at this time.    Preoperative Review of :   reviewed - no record of controlled substances prescribed.          Review of Systems  Constitutional, neuro, ENT, endocrine, pulmonary, cardiac, gastrointestinal, genitourinary, musculoskeletal, integument and psychiatric systems are negative, except as otherwise noted.    At time of exam, patient states he has chronic rectal discomfort related to his hemorrhoids, otherwise feels fine at exam.     Patient blames chronic nasal congestion on smoking tobacco.  He is trying to moderate this activity.    Patient Active Problem List    Diagnosis Date Noted     Gallstone pancreatitis 12/23/2017     Priority: Medium     Pancreatitis  "12/23/2017     Priority: Medium     Choledocholithiasis 12/21/2017     Priority: Medium      Past Medical History:   Diagnosis Date     Gastroesophageal reflux disease      Past Surgical History:   Procedure Laterality Date     ESOPHAGOSCOPY, GASTROSCOPY, DUODENOSCOPY (EGD), COMBINED N/A 12/22/2017    Procedure: COMBINED ENDOSCOPIC ULTRASOUND, ESOPHAGOSCOPY, GASTROSCOPY, DUODENOSCOPY (EGD);  ENDOSCOPIC ULTRASOUND;  Surgeon: Raeann Brito MD;  Location: [facility] OR     LAPAROSCOPIC CHOLECYSTECTOMY WITH CHOLANGIOGRAMS N/A 12/23/2017    Procedure: LAPAROSCOPIC CHOLECYSTECTOMY WITH CHOLANGIOGRAMS;  LAPAROSCOPIC CHOLECYSTECTOMY WITH CHOLANGIOGRAMS ;  Surgeon: Amber Nunez MD;  Location:  OR     Current Outpatient Medications   Medication Sig Dispense Refill     sildenafil (VIAGRA) 100 MG tablet Take 100 mg by mouth       acetaminophen (TYLENOL) 325 MG tablet Take 2 tablets (650 mg) by mouth every 4 hours as needed for other (surgical pain) 100 tablet        Allergies   Allergen Reactions     Penicillins      Sulfa Drugs         Social History     Tobacco Use     Smoking status: Current Every Day Smoker     Packs/day: 1.00     Smokeless tobacco: Never Used   Substance Use Topics     Alcohol use: Yes     Comment: 0-1 a month       History   Drug Use     Types: Marijuana         Objective     BP (!) 148/78 (BP Location: Right arm, Patient Position: Sitting, Cuff Size: Adult Large)   Pulse 76   Temp 98  F (36.7  C) (Oral)   Resp 16   Ht 1.867 m (6' 1.5\")   Wt 106.6 kg (235 lb)   SpO2 99%   BMI 30.58 kg/m      Physical Exam  Vital signs reviewed.  Patient is in no acute appearing distress.  Breathing appears nonlabored.  Patient is alert and oriented ×3.  Patient is very pleasant, making good eye contact and responding with clear fluent speech.    ENT exam: Patient has bilateral cerumen impaction of ear canals.  He declined having these cleared at exam, telling me he cleans his ears weekly.  He has " right turbinate edema without rhinorrhea, left nasal turbinates are normal-appearing.  No oropharyngeal injection or exudate.    Neck: supple with no adenoapthy, palpable abnormal masses, or thyroid abnormality.    Heart: Heart rate is regular without murmur.    Lungs: Lungs are clear to auscultation with good airflow bilaterally.    Abdomen:  Abdomen is soft, nontender.  No palpable abnormal masses or organomegaly.  Bowel sounds are normal.    Rectal exam: Deferred by patient, stating no new problems since evaluation by previous provider.    Back: No areas of tenderness.    Skin/extremities: Warm and dry, with no lower leg edema.    Neuro: No acute focal deficits or abnormalities.    See earlier documentation.  In addition, patient answers questions appropriately.  No pressured speech.  No psychomotor agitation.    No results for input(s): HGB, PLT, INR, NA, POTASSIUM, CR, A1C in the last 15631 hours.     Has rectal bleeding with hemorrhoids.     Diagnostics:  Recent Results (from the past 168 hour(s))   CBC with platelets    Collection Time: 02/22/22  4:40 PM   Result Value Ref Range    WBC Count 6.8 4.0 - 11.0 10e3/uL    RBC Count 4.95 4.40 - 5.90 10e6/uL    Hemoglobin 14.7 13.3 - 17.7 g/dL    Hematocrit 43.6 40.0 - 53.0 %    MCV 88 78 - 100 fL    MCH 29.7 26.5 - 33.0 pg    MCHC 33.7 31.5 - 36.5 g/dL    RDW 14.0 10.0 - 15.0 %    Platelet Count 252 150 - 450 10e3/uL   Basic metabolic panel    Collection Time: 02/22/22  4:40 PM   Result Value Ref Range    Sodium 141 133 - 144 mmol/L    Potassium 3.9 3.4 - 5.3 mmol/L    Chloride 109 94 - 109 mmol/L    Carbon Dioxide (CO2) 27 20 - 32 mmol/L    Anion Gap 5 3 - 14 mmol/L    Urea Nitrogen 14 7 - 30 mg/dL    Creatinine 0.92 0.66 - 1.25 mg/dL    Calcium 9.0 8.5 - 10.1 mg/dL    Glucose 88 70 - 99 mg/dL    GFR Estimate >90 >60 mL/min/1.73m2      Lab results not available for review at time of exam.  Please see result note.    No EKG required, no history of coronary heart  disease, significant arrhythmia, peripheral arterial disease or other structural heart disease.    Revised Cardiac Risk Index (RCRI):  The patient has the following serious cardiovascular risks for perioperative complications:   - No serious cardiac risks = 0 points     RCRI Interpretation: 0 points: Class I (very low risk - 0.4% complication rate)           Signed Electronically by: Jose Trejo DO  Copy of this evaluation report is provided to requesting physician.

## 2022-02-23 LAB
ANION GAP SERPL CALCULATED.3IONS-SCNC: 5 MMOL/L (ref 3–14)
BUN SERPL-MCNC: 14 MG/DL (ref 7–30)
CALCIUM SERPL-MCNC: 9 MG/DL (ref 8.5–10.1)
CHLORIDE BLD-SCNC: 109 MMOL/L (ref 94–109)
CO2 SERPL-SCNC: 27 MMOL/L (ref 20–32)
CREAT SERPL-MCNC: 0.92 MG/DL (ref 0.66–1.25)
GFR SERPL CREATININE-BSD FRML MDRD: >90 ML/MIN/1.73M2
GLUCOSE BLD-MCNC: 88 MG/DL (ref 70–99)
POTASSIUM BLD-SCNC: 3.9 MMOL/L (ref 3.4–5.3)
SODIUM SERPL-SCNC: 141 MMOL/L (ref 133–144)

## 2022-03-02 ENCOUNTER — ANESTHESIA (OUTPATIENT)
Dept: SURGERY | Facility: CLINIC | Age: 57
End: 2022-03-02
Payer: COMMERCIAL

## 2022-03-02 ENCOUNTER — HOSPITAL ENCOUNTER (OUTPATIENT)
Facility: CLINIC | Age: 57
Discharge: HOME | End: 2022-03-02
Attending: COLON & RECTAL SURGERY | Admitting: COLON & RECTAL SURGERY
Payer: COMMERCIAL

## 2022-03-02 ENCOUNTER — ANESTHESIA EVENT (OUTPATIENT)
Dept: SURGERY | Facility: CLINIC | Age: 57
End: 2022-03-02
Payer: COMMERCIAL

## 2022-03-02 VITALS
BODY MASS INDEX: 29.07 KG/M2 | RESPIRATION RATE: 18 BRPM | WEIGHT: 226.5 LBS | OXYGEN SATURATION: 98 % | HEIGHT: 74 IN | SYSTOLIC BLOOD PRESSURE: 150 MMHG | DIASTOLIC BLOOD PRESSURE: 96 MMHG | HEART RATE: 72 BPM | TEMPERATURE: 98.6 F

## 2022-03-02 DIAGNOSIS — K64.2 GRADE III HEMORRHOIDS: Primary | ICD-10-CM

## 2022-03-02 LAB — COLONOSCOPY: NORMAL

## 2022-03-02 PROCEDURE — 272N000001 HC OR GENERAL SUPPLY STERILE: Performed by: COLON & RECTAL SURGERY

## 2022-03-02 PROCEDURE — 370N000017 HC ANESTHESIA TECHNICAL FEE, PER MIN: Performed by: COLON & RECTAL SURGERY

## 2022-03-02 PROCEDURE — 360N000075 HC SURGERY LEVEL 2, PER MIN: Performed by: COLON & RECTAL SURGERY

## 2022-03-02 PROCEDURE — 250N000009 HC RX 250: Performed by: COLON & RECTAL SURGERY

## 2022-03-02 PROCEDURE — 250N000013 HC RX MED GY IP 250 OP 250 PS 637: Performed by: COLON & RECTAL SURGERY

## 2022-03-02 PROCEDURE — 258N000003 HC RX IP 258 OP 636: Performed by: NURSE ANESTHETIST, CERTIFIED REGISTERED

## 2022-03-02 PROCEDURE — 250N000009 HC RX 250: Performed by: NURSE ANESTHETIST, CERTIFIED REGISTERED

## 2022-03-02 PROCEDURE — 250N000011 HC RX IP 250 OP 636: Performed by: NURSE ANESTHETIST, CERTIFIED REGISTERED

## 2022-03-02 PROCEDURE — 88304 TISSUE EXAM BY PATHOLOGIST: CPT | Mod: TC | Performed by: COLON & RECTAL SURGERY

## 2022-03-02 PROCEDURE — 710N000012 HC RECOVERY PHASE 2, PER MINUTE: Performed by: COLON & RECTAL SURGERY

## 2022-03-02 PROCEDURE — 999N000141 HC STATISTIC PRE-PROCEDURE NURSING ASSESSMENT: Performed by: COLON & RECTAL SURGERY

## 2022-03-02 RX ORDER — NALOXONE HYDROCHLORIDE 0.4 MG/ML
0.4 INJECTION, SOLUTION INTRAMUSCULAR; INTRAVENOUS; SUBCUTANEOUS
Status: DISCONTINUED | OUTPATIENT
Start: 2022-03-02 | End: 2022-03-02 | Stop reason: HOSPADM

## 2022-03-02 RX ORDER — BUPIVACAINE HYDROCHLORIDE AND EPINEPHRINE 2.5; 5 MG/ML; UG/ML
INJECTION, SOLUTION EPIDURAL; INFILTRATION; INTRACAUDAL; PERINEURAL PRN
Status: DISCONTINUED | OUTPATIENT
Start: 2022-03-02 | End: 2022-03-02 | Stop reason: HOSPADM

## 2022-03-02 RX ORDER — OXYCODONE HYDROCHLORIDE 5 MG/1
5 TABLET ORAL
Status: COMPLETED | OUTPATIENT
Start: 2022-03-02 | End: 2022-03-02

## 2022-03-02 RX ORDER — ONDANSETRON 4 MG/1
4 TABLET, ORALLY DISINTEGRATING ORAL EVERY 30 MIN PRN
Status: DISCONTINUED | OUTPATIENT
Start: 2022-03-02 | End: 2022-03-02 | Stop reason: HOSPADM

## 2022-03-02 RX ORDER — SODIUM CHLORIDE, SODIUM LACTATE, POTASSIUM CHLORIDE, CALCIUM CHLORIDE 600; 310; 30; 20 MG/100ML; MG/100ML; MG/100ML; MG/100ML
INJECTION, SOLUTION INTRAVENOUS CONTINUOUS
Status: DISCONTINUED | OUTPATIENT
Start: 2022-03-02 | End: 2022-03-02 | Stop reason: HOSPADM

## 2022-03-02 RX ORDER — LIDOCAINE 40 MG/G
CREAM TOPICAL
Status: DISCONTINUED | OUTPATIENT
Start: 2022-03-02 | End: 2022-03-02 | Stop reason: HOSPADM

## 2022-03-02 RX ORDER — PROPOFOL 10 MG/ML
INJECTION, EMULSION INTRAVENOUS CONTINUOUS PRN
Status: DISCONTINUED | OUTPATIENT
Start: 2022-03-02 | End: 2022-03-02

## 2022-03-02 RX ORDER — ALBUTEROL SULFATE 0.83 MG/ML
2.5 SOLUTION RESPIRATORY (INHALATION) EVERY 4 HOURS PRN
Status: DISCONTINUED | OUTPATIENT
Start: 2022-03-02 | End: 2022-03-02 | Stop reason: HOSPADM

## 2022-03-02 RX ORDER — MEPERIDINE HYDROCHLORIDE 25 MG/ML
12.5 INJECTION INTRAMUSCULAR; INTRAVENOUS; SUBCUTANEOUS
Status: DISCONTINUED | OUTPATIENT
Start: 2022-03-02 | End: 2022-03-02 | Stop reason: HOSPADM

## 2022-03-02 RX ORDER — ONDANSETRON 2 MG/ML
4 INJECTION INTRAMUSCULAR; INTRAVENOUS EVERY 30 MIN PRN
Status: DISCONTINUED | OUTPATIENT
Start: 2022-03-02 | End: 2022-03-02 | Stop reason: HOSPADM

## 2022-03-02 RX ORDER — DIMENHYDRINATE 50 MG/ML
25 INJECTION, SOLUTION INTRAMUSCULAR; INTRAVENOUS
Status: DISCONTINUED | OUTPATIENT
Start: 2022-03-02 | End: 2022-03-02 | Stop reason: HOSPADM

## 2022-03-02 RX ORDER — FENTANYL CITRATE 50 UG/ML
25 INJECTION, SOLUTION INTRAMUSCULAR; INTRAVENOUS EVERY 5 MIN PRN
Status: DISCONTINUED | OUTPATIENT
Start: 2022-03-02 | End: 2022-03-02 | Stop reason: HOSPADM

## 2022-03-02 RX ORDER — FENTANYL CITRATE 50 UG/ML
INJECTION, SOLUTION INTRAMUSCULAR; INTRAVENOUS PRN
Status: DISCONTINUED | OUTPATIENT
Start: 2022-03-02 | End: 2022-03-02

## 2022-03-02 RX ORDER — OXYCODONE HYDROCHLORIDE 5 MG/1
5 TABLET ORAL EVERY 4 HOURS PRN
Status: DISCONTINUED | OUTPATIENT
Start: 2022-03-02 | End: 2022-03-02 | Stop reason: HOSPADM

## 2022-03-02 RX ORDER — LABETALOL HYDROCHLORIDE 5 MG/ML
10 INJECTION, SOLUTION INTRAVENOUS
Status: DISCONTINUED | OUTPATIENT
Start: 2022-03-02 | End: 2022-03-02 | Stop reason: HOSPADM

## 2022-03-02 RX ORDER — FENTANYL CITRATE 50 UG/ML
25 INJECTION, SOLUTION INTRAMUSCULAR; INTRAVENOUS
Status: DISCONTINUED | OUTPATIENT
Start: 2022-03-02 | End: 2022-03-02 | Stop reason: HOSPADM

## 2022-03-02 RX ORDER — HYDROMORPHONE HCL IN WATER/PF 6 MG/30 ML
0.2 PATIENT CONTROLLED ANALGESIA SYRINGE INTRAVENOUS EVERY 5 MIN PRN
Status: DISCONTINUED | OUTPATIENT
Start: 2022-03-02 | End: 2022-03-02 | Stop reason: HOSPADM

## 2022-03-02 RX ORDER — NALOXONE HYDROCHLORIDE 0.4 MG/ML
0.2 INJECTION, SOLUTION INTRAMUSCULAR; INTRAVENOUS; SUBCUTANEOUS
Status: DISCONTINUED | OUTPATIENT
Start: 2022-03-02 | End: 2022-03-02 | Stop reason: HOSPADM

## 2022-03-02 RX ORDER — KETAMINE HYDROCHLORIDE 10 MG/ML
INJECTION INTRAMUSCULAR; INTRAVENOUS PRN
Status: DISCONTINUED | OUTPATIENT
Start: 2022-03-02 | End: 2022-03-02

## 2022-03-02 RX ORDER — ACETAMINOPHEN 325 MG/1
975 TABLET ORAL ONCE
Status: DISCONTINUED | OUTPATIENT
Start: 2022-03-02 | End: 2022-03-02 | Stop reason: HOSPADM

## 2022-03-02 RX ORDER — SODIUM CHLORIDE, SODIUM LACTATE, POTASSIUM CHLORIDE, CALCIUM CHLORIDE 600; 310; 30; 20 MG/100ML; MG/100ML; MG/100ML; MG/100ML
INJECTION, SOLUTION INTRAVENOUS CONTINUOUS PRN
Status: DISCONTINUED | OUTPATIENT
Start: 2022-03-02 | End: 2022-03-02

## 2022-03-02 RX ORDER — OXYCODONE HYDROCHLORIDE 5 MG/1
5 TABLET ORAL EVERY 6 HOURS PRN
Qty: 10 TABLET | Refills: 0 | Status: SHIPPED | OUTPATIENT
Start: 2022-03-02

## 2022-03-02 RX ORDER — DIAZEPAM 2 MG
2 TABLET ORAL EVERY 6 HOURS PRN
Qty: 10 TABLET | Refills: 0 | Status: SHIPPED | OUTPATIENT
Start: 2022-03-02

## 2022-03-02 RX ORDER — PROPOFOL 10 MG/ML
INJECTION, EMULSION INTRAVENOUS PRN
Status: DISCONTINUED | OUTPATIENT
Start: 2022-03-02 | End: 2022-03-02

## 2022-03-02 RX ORDER — ACETAMINOPHEN 325 MG/1
975 TABLET ORAL ONCE
Status: COMPLETED | OUTPATIENT
Start: 2022-03-02 | End: 2022-03-02

## 2022-03-02 RX ORDER — MAGNESIUM HYDROXIDE 1200 MG/15ML
LIQUID ORAL PRN
Status: DISCONTINUED | OUTPATIENT
Start: 2022-03-02 | End: 2022-03-02 | Stop reason: HOSPADM

## 2022-03-02 RX ADMIN — FENTANYL CITRATE 50 MCG: 50 INJECTION, SOLUTION INTRAMUSCULAR; INTRAVENOUS at 13:10

## 2022-03-02 RX ADMIN — MIDAZOLAM 2 MG: 1 INJECTION INTRAMUSCULAR; INTRAVENOUS at 13:05

## 2022-03-02 RX ADMIN — PROPOFOL 10 MG: 10 INJECTION, EMULSION INTRAVENOUS at 14:03

## 2022-03-02 RX ADMIN — PROPOFOL 20 MG: 10 INJECTION, EMULSION INTRAVENOUS at 13:21

## 2022-03-02 RX ADMIN — ACETAMINOPHEN 975 MG: 325 TABLET, FILM COATED ORAL at 11:10

## 2022-03-02 RX ADMIN — PROPOFOL 20 MG: 10 INJECTION, EMULSION INTRAVENOUS at 13:23

## 2022-03-02 RX ADMIN — PROPOFOL 30 MG: 10 INJECTION, EMULSION INTRAVENOUS at 13:12

## 2022-03-02 RX ADMIN — FENTANYL CITRATE 50 MCG: 50 INJECTION, SOLUTION INTRAMUSCULAR; INTRAVENOUS at 13:40

## 2022-03-02 RX ADMIN — PROPOFOL 10 MG: 10 INJECTION, EMULSION INTRAVENOUS at 13:45

## 2022-03-02 RX ADMIN — PROPOFOL 20 MG: 10 INJECTION, EMULSION INTRAVENOUS at 13:15

## 2022-03-02 RX ADMIN — PROPOFOL 20 MG: 10 INJECTION, EMULSION INTRAVENOUS at 13:41

## 2022-03-02 RX ADMIN — PROPOFOL 10 MG: 10 INJECTION, EMULSION INTRAVENOUS at 13:31

## 2022-03-02 RX ADMIN — OXYCODONE HYDROCHLORIDE 5 MG: 5 TABLET ORAL at 15:07

## 2022-03-02 RX ADMIN — PROPOFOL 20 MG: 10 INJECTION, EMULSION INTRAVENOUS at 13:26

## 2022-03-02 RX ADMIN — Medication 10 MG: at 13:18

## 2022-03-02 RX ADMIN — PROPOFOL 35 MCG/KG/MIN: 10 INJECTION, EMULSION INTRAVENOUS at 13:45

## 2022-03-02 RX ADMIN — Medication 20 MG: at 13:45

## 2022-03-02 RX ADMIN — Medication 10 MG: at 13:15

## 2022-03-02 RX ADMIN — PROPOFOL 10 MG: 10 INJECTION, EMULSION INTRAVENOUS at 13:52

## 2022-03-02 RX ADMIN — PROPOFOL 15 MG: 10 INJECTION, EMULSION INTRAVENOUS at 13:28

## 2022-03-02 RX ADMIN — Medication 10 MG: at 13:56

## 2022-03-02 RX ADMIN — SODIUM CHLORIDE, POTASSIUM CHLORIDE, SODIUM LACTATE AND CALCIUM CHLORIDE: 600; 310; 30; 20 INJECTION, SOLUTION INTRAVENOUS at 13:22

## 2022-03-02 RX ADMIN — PROPOFOL 20 MG: 10 INJECTION, EMULSION INTRAVENOUS at 13:18

## 2022-03-02 RX ADMIN — PROPOFOL 20 MG: 10 INJECTION, EMULSION INTRAVENOUS at 13:43

## 2022-03-02 RX ADMIN — PROPOFOL 10 MG: 10 INJECTION, EMULSION INTRAVENOUS at 13:48

## 2022-03-02 RX ADMIN — SODIUM CHLORIDE, POTASSIUM CHLORIDE, SODIUM LACTATE AND CALCIUM CHLORIDE: 600; 310; 30; 20 INJECTION, SOLUTION INTRAVENOUS at 13:05

## 2022-03-02 ASSESSMENT — LIFESTYLE VARIABLES: TOBACCO_USE: 1

## 2022-03-02 NOTE — DISCHARGE INSTRUCTIONS

## 2022-03-02 NOTE — BRIEF OP NOTE
[facility]    Brief Operative Note    Pre-operative diagnosis: Hemorrhoid [K64.9]  Post-operative diagnosis prolapsing mixed hemorrhoids    Procedure: Procedure(s):  intraoperative COLONOSCOPY  Three quadrant HEMORRHOIDECTOMY  Surgeon: Surgeon(s) and Role:     * Raquel Hughes MD - Primary  Anesthesia: Monitor Anesthesia Care   Estimated Blood Loss: 15 mL from 3/2/2022  1:08 PM to 3/2/2022  2:24 PM      Drains: None  Specimens:   ID Type Source Tests Collected by Time Destination   1 : Hemorrhoids Tissue Rectum SURGICAL PATHOLOGY EXAM Raquel Hughes MD 3/2/2022  2:10 PM      Findings:   Colonoscopy with diverticula, mixed hemorrhoids in 3 quadrants.  Complications: None.  Implants: * No implants in log *

## 2022-03-02 NOTE — ANESTHESIA CARE TRANSFER NOTE
Patient: Contreras Pearson    Procedure: Procedure(s):  intraoperative COLONOSCOPY  Three quadrant HEMORRHOIDECTOMY       Diagnosis: Hemorrhoid [K64.9]  Diagnosis Additional Information: No value filed.    Anesthesia Type:   MAC     Note:    Oropharynx: oropharynx clear of all foreign objects and spontaneously breathing  Level of Consciousness: awake  Oxygen Supplementation: room air    Independent Airway: airway patency satisfactory and stable  Dentition: dentition unchanged  Vital Signs Stable: post-procedure vital signs reviewed and stable  Report to RN Given: handoff report given  Patient transferred to: Phase II  Comments: Patients meets criteria for phase 2 recovery. VSS. Report to RN.  Pt c/o needing to have a bowel movement.  Handoff Report: Identifed the Patient, Identified the Reponsible Provider, Reviewed the pertinent medical history, Discussed the surgical course, Reviewed Intra-OP anesthesia mangement and issues during anesthesia, Set expectations for post-procedure period and Allowed opportunity for questions and acknowledgement of understanding      Vitals:  Vitals Value Taken Time   BP     Temp     Pulse     Resp     SpO2         Electronically Signed By: RASHEED Rhodes CRNA  March 2, 2022  2:31 PM

## 2022-03-02 NOTE — OP NOTE
Procedure Date: 03/02/2022    PREOPERATIVE DIAGNOSIS:  Mixed hemorrhoids with bleeding.    POSTOPERATIVE DIAGNOSIS:  Colonoscopy with diverticulum and mixed hemorrhoids with bleeding.    PROCEDURE:  Intraoperative colonoscopy and exam under anesthesia with hemorrhoidectomy.    SURGEON:  Raquel Hughes MD    ASSISTANT:  None.    ANESTHESIA:  Monitored anesthesia care, plus 30 mL of 1% lidocaine and 30 mL of 0.25% Marcaine with 1:200,000 epinephrine.    INDICATIONS FOR PROCEDURE:  Contreras is a 56-year-old man who has had a long history of mixed hemorrhoids.  He has tried conservative measures without significant improvement and wished to have this corrected.  He was briefed on the risks of bleeding, infection, alteration of bowel control, recurrence, possible prolonged wound healing, and expected recovery.  He understood and wished to proceed.    FINDINGS:  He had a normal colonoscopy with an excellent preparation.  There were some diverticulum and hemorrhoids, otherwise no significant lesions.  He had a 3-quadrant mixed hemorrhoids with mucosal irritation upon removal, there was some components of thrombosis.  No signs of infection.    DESCRIPTION OF PROCEDURE:  After informed consent was obtained, the patient was taken to the operating room.  He was positioned on the cart and sedated.  A timeout was undertaken.  Rectal exam revealed mixed hemorrhoids without evidence of stenosis.  The colonoscope was then advanced in through the anal opening and traversed to the cecum without difficulty.  The terminal ileum was intubated several centimeters and was normal.  Circumferential evaluation of the colonic mucosa revealed no abnormalities other than diverticulum in the sigmoid colon.  On retroflex, there were some hemorrhoids.    He was then lightened from the sedation, moved over to the operating room table and re-sedated.  Perianal region was taped, prepped and draped in the usual fashion.  Again, a timeout was undertaken.  I  identified the largest mixed hemorrhoid to be towards the left anterior.  The next was the left posterior and then the right lateral was smaller.  I began with the largest one on the left, marking out an ellipse overlying the redundant external skin and up towards the apex in the upper anal canal.  I then incised the skin and elevated the hemorrhoidal complex up off of the sphincter with excellent hemostasis.  The apex was clamped and tied with a 3-0 Vicryl.  The mucosal edge was reapproximated with a running locking 3-0 Vicryl suture and continuation of a simple running on the skin.  This was tied at the external apex and then oversewed towards the internal apex and re-tied.  A repeat procedure was done on the left posterior and on the right lateral.  At the completion of this, we irrigated out the rectum and found each suture line to be nicely hemostatic with a flat contour of the perianal skin and anal canal.  A dry gauze dressing was placed externally.  Of note, prior to excising the hemorrhoids, I injected a total of 30 mL of lidocaine and 30 mL of Marcaine, half on each side, and then the subcutaneous tissues to perform a block.    ESTIMATED BLOOD LOSS:  15 mL    COMPLICATIONS:  No immediate complications.    SPECIMENS:  Hemorrhoidal tissue was sent as a single specimen to pathology.    Raquel Hughes MD        D: 2022   T: 2022   MT: CARMEN    Name:     ROBERT PEREZ  MRN:      [id number]        Account:        [id number]   :      1965           Procedure Date: 2022     Document: [id number]    cc:  Raquel Hughes MD   [facility]

## 2022-03-02 NOTE — ANESTHESIA PREPROCEDURE EVALUATION
Anesthesia Pre-Procedure Evaluation    Patient: Contreras Pearson   MRN: [id number] : 1965        Procedure : Procedure(s):  intraoperative COLONOSCOPY  HEMORRHOIDECTOMY          Past Medical History:   Diagnosis Date     Other chronic pain     mid to low back and rt arm     Pancreatic disease     past hx of prior to lap inocencio      Past Surgical History:   Procedure Laterality Date     ESOPHAGOSCOPY, GASTROSCOPY, DUODENOSCOPY (EGD), COMBINED N/A 2017    Procedure: COMBINED ENDOSCOPIC ULTRASOUND, ESOPHAGOSCOPY, GASTROSCOPY, DUODENOSCOPY (EGD);  ENDOSCOPIC ULTRASOUND;  Surgeon: Raeann Brito MD;  Location: [facility]     LAPAROSCOPIC CHOLECYSTECTOMY WITH CHOLANGIOGRAMS N/A 2017    Procedure: LAPAROSCOPIC CHOLECYSTECTOMY WITH CHOLANGIOGRAMS;  LAPAROSCOPIC CHOLECYSTECTOMY WITH CHOLANGIOGRAMS ;  Surgeon: Amber Nunez MD;  Location: [facility]      Allergies   Allergen Reactions     Penicillins      Sulfa Drugs       Social History     Tobacco Use     Smoking status: Current Every Day Smoker     Packs/day: 1.00     Years: 40.00     Pack years: 40.00     Types: Cigarettes     Smokeless tobacco: Never Used   Substance Use Topics     Alcohol use: Yes     Comment: 1 drink/monthly      Wt Readings from Last 1 Encounters:   22 102.7 kg (226 lb 8 oz)        Anesthesia Evaluation   Pt has had prior anesthetic. Type: General.    No history of anesthetic complications       ROS/MED HX  ENT/Pulmonary:     (+) tobacco use, Current use, 1 packs/day, 30  Pack-Year Hx,      Neurologic:  - neg neurologic ROS     Cardiovascular:  - neg cardiovascular ROS     METS/Exercise Tolerance:     Hematologic:  - neg hematologic  ROS     Musculoskeletal:       GI/Hepatic: Comment: Ongoing hemorrhoid issues for surgical assessment today      Renal/Genitourinary:  - neg Renal ROS     Endo:  - neg endo ROS     Psychiatric/Substance Use:  - neg psychiatric ROS     Infectious Disease:  - neg infectious disease ROS      Malignancy:  - neg malignancy ROS     Other:  - neg other ROS    (+) , H/O Chronic Pain,        Physical Exam    Airway        Mallampati: II   TM distance: > 3 FB   Neck ROM: full   Mouth opening: > 3 cm    Respiratory Devices and Support         Dental  no notable dental history         Cardiovascular   cardiovascular exam normal       Rhythm and rate: regular and normal     Pulmonary   pulmonary exam normal        breath sounds clear to auscultation       Other findings: Lab Test        02/22/22 12/30/17 12/29/17 12/22/17 12/21/17 12/19/17                       1640          0628          0747          0409          1701          2300          WBC          6.8          8.9          13.8*          < >        24.5*        5.8           HGB          14.7         10.8*        10.4*          < >        16.5         14.5          MCV          88           89           90             < >        87           88            PLT          252          456*         408            < >        375          261           INR           --           --           --           --          0.89         0.89           < > = values in this interval not displayed.                  Lab Test        02/22/22 12/30/17 12/29/17                       1640          0628          0747          NA           141          138          138           POTASSIUM    3.9          4.1          4.0           CHLORIDE     109          105          104           CO2          27           26           25            BUN          14           13           9             CR           0.92         0.69         0.69          ANIONGAP     5            7            9             MARLENE          9.0          8.3*         8.3*          GLC          88           139*         92                   EKG Interpretation: Sinus rhythm Normal ECG No previous ECGs availabl  2017 tracing     OUTSIDE LABS:  CBC:   Lab Results   Component Value Date     WBC 6.8 02/22/2022    WBC 8.9 12/30/2017    HGB 14.7 02/22/2022    HGB 10.8 (L) 12/30/2017    HCT 43.6 02/22/2022    HCT 32.6 (L) 12/30/2017     02/22/2022     (H) 12/30/2017     BMP:   Lab Results   Component Value Date     02/22/2022     12/30/2017    POTASSIUM 3.9 02/22/2022    POTASSIUM 4.1 12/30/2017    CHLORIDE 109 02/22/2022    CHLORIDE 105 12/30/2017    CO2 27 02/22/2022    CO2 26 12/30/2017    BUN 14 02/22/2022    BUN 13 12/30/2017    CR 0.92 02/22/2022    CR 0.69 12/30/2017    GLC 88 02/22/2022     (H) 12/30/2017     COAGS:   Lab Results   Component Value Date    PTT 34 12/19/2017    INR 0.89 12/21/2017     POC: No results found for: BGM, HCG, HCGS  HEPATIC:   Lab Results   Component Value Date    ALBUMIN 2.4 (L) 12/28/2017    PROTTOTAL 6.3 (L) 12/28/2017    ALT 79 (H) 12/28/2017    AST 41 12/28/2017    ALKPHOS 214 (H) 12/28/2017    BILITOTAL 0.9 12/28/2017    DALTON 38 12/19/2017     OTHER:   Lab Results   Component Value Date    LACT 0.7 12/26/2017    MARLENE 9.0 02/22/2022    LIPASE 287 12/30/2017       Anesthesia Plan    ASA Status:  2      Anesthesia Type: MAC.   Induction: Intravenous.   Maintenance: TIVA.        Consents    Anesthesia Plan(s) and associated risks, benefits, and realistic alternatives discussed. Questions answered and patient/representative(s) expressed understanding.     - Discussed: Risks, Benefits and Alternatives for BOTH SEDATION and the PROCEDURE were discussed     - Discussed with:  Patient      - Extended Intubation/Ventilatory Support Discussed: No.      - Patient is DNR/DNI Status: No    Use of blood products discussed: No .     Postoperative Care    Pain management: IV analgesics, Oral pain medications, Multi-modal analgesia, Peripheral nerve block (Single Shot).   PONV prophylaxis: Ondansetron (or other 5HT-3), Background Propofol Infusion     Comments:                Raffaele Conde MD

## 2022-03-02 NOTE — ANESTHESIA POSTPROCEDURE EVALUATION
Patient: Contreras Pearson    Procedure: Procedure(s):  intraoperative COLONOSCOPY  Three quadrant HEMORRHOIDECTOMY       Anesthesia Type:  MAC    Note:     Postop Pain Control: Uneventful            Sign Out: Well controlled pain   PONV: No   Neuro/Psych: Uneventful            Sign Out: Acceptable/Baseline neuro status   Airway/Respiratory: Uneventful            Sign Out: Acceptable/Baseline resp. status   CV/Hemodynamics: Uneventful            Sign Out: Acceptable CV status; No obvious hypovolemia; No obvious fluid overload   Other NRE: NONE   DID A NON-ROUTINE EVENT OCCUR? No           Last vitals:  Vitals Value Taken Time   /103 03/02/22 1500   Temp 98.6  F (37  C) 03/02/22 1430   Pulse 74 03/02/22 1500   Resp 18 03/02/22 1500   SpO2 98 % 03/02/22 1500       Electronically Signed By: Dominguez Griggs MD  March 2, 2022  3:27 PM

## 2022-03-03 LAB
PATH REPORT.COMMENTS IMP SPEC: NORMAL
PATH REPORT.COMMENTS IMP SPEC: NORMAL
PATH REPORT.FINAL DX SPEC: NORMAL
PATH REPORT.GROSS SPEC: NORMAL
PATH REPORT.MICROSCOPIC SPEC OTHER STN: NORMAL
PATH REPORT.RELEVANT HX SPEC: NORMAL
PHOTO IMAGE: NORMAL

## 2022-03-03 PROCEDURE — 88304 TISSUE EXAM BY PATHOLOGIST: CPT | Mod: 26 | Performed by: PATHOLOGY

## 2022-03-06 ENCOUNTER — HEALTH MAINTENANCE LETTER (OUTPATIENT)
Age: 57
End: 2022-03-06

## 2022-05-22 NOTE — CONSULTS
General Surgery Consultation    Contreras Pearson MRN# [id number]   Age: 52 year old YOB: 1965     Date of Admission:  12/22/2017    Reason for consult:            Abdominal pain       Requesting physician:            Dr. Wheeler                Assessment and Plan:   Assessment:    Contreras Pearson is a previously healthy 52-year-old man who is admitted with gallstone pancreatitis.  His abdominal pain is improving.  Lipase and LFTs decreasing, EUS negative for choledocholithiasis, therefore he may have passed a common duct stone.      Plan:   I have offered the patient a laparoscopic cholecystectomy with intraoperative cholangiogram.      We have discussed the indication, alternatives, risks and expected recovery.  Specifically we have discussed incisions, scarring, anesthesia, postoperative infections, bleeding, blood transfusion, open conversion, common bile duct injury, injury to intra-abdominal organs, adhesions leading to bowel obstruction, retained common bile duct stone, bile leak, DVT, PE, hernia, post cholecystectomy diarrhea, recovery, postoperative dietary restrictions and physical limitations. We have discussed the recommended interventions and treatments for each of these potential complications.  All questions have been answered to the best of my ability.    He agrees to proceed with surgery.  He understands this will not improve her pancreatitis, but it will prevent another episode.            Chief Complaint:   Abdominal pain     History is obtained from the patient.         History of Present Illness:   This patient is a 52 year old previously healthy  male who presents with epigastric region abdominal pain radiating through to the back for the past 5 days.  The pain is constant.  He has had similar pain in the past ×6 months.  There is not an association with eating any type of food.  Positive for associated symptoms of nausea.  He  does have a history of jaundice or dark  urine.  He  has not had pancreatitis in the past.              Past Medical History:    has no past medical history on file.          Past Surgical History:   No past surgical history on file.          Social History:     Social History   Substance Use Topics     Smoking status: Current Every Day Smoker     Packs/day: 1.00     Smokeless tobacco: Never Used     Alcohol use Yes      Comment: occasionally             Family History:     Family history reviewed and not pertinent.       Allergies:   All allergies reviewed and addressed          Medications:     Current Facility-Administered Medications on File Prior to Encounter:  [COMPLETED] fentaNYL (PF) (SUBLIMAZE) injection 50 mcg   [DISCONTINUED] lidocaine 1 % 1 mL   [DISCONTINUED] lidocaine (LMX4) cream   [DISCONTINUED] sodium chloride (PF) 0.9% PF flush 3 mL   [DISCONTINUED] sodium chloride (PF) 0.9% PF flush 3 mL   [DISCONTINUED] May continue current IV fluids if patient has IV fluids infusing.   [DISCONTINUED] sodium chloride (PF) 0.9% PF flush 3 mL   [DISCONTINUED] indomethacin (INDOCIN) 50 MG Suppository 100 mg   [DISCONTINUED] lactated ringers infusion   [DISCONTINUED] lactated ringers infusion   [DISCONTINUED] ondansetron (ZOFRAN-ODT) ODT tab 4 mg   [DISCONTINUED] ondansetron (ZOFRAN) injection 4 mg   [DISCONTINUED] prochlorperazine (COMPAZINE) injection 10 mg   [DISCONTINUED] meperidine (DEMEROL) injection 12.5 mg   [DISCONTINUED] naloxone (NARCAN) injection 0.1-0.4 mg   [DISCONTINUED] fentaNYL (PF) (SUBLIMAZE) injection 25-50 mcg   [DISCONTINUED] fentaNYL (PF) (SUBLIMAZE) injection 25-50 mcg   [DISCONTINUED] naloxone (NARCAN) injection 0.1-0.4 mg   [DISCONTINUED] oxyCODONE IR (ROXICODONE) tablet 5-10 mg   [DISCONTINUED] potassium chloride SA (K-DUR/KLOR-CON M) CR tablet 20-40 mEq   [DISCONTINUED] potassium chloride (KLOR-CON) Packet 20-40 mEq   [DISCONTINUED] potassium chloride 10 mEq in 100 mL sterile water intermittent infusion (premix)   [DISCONTINUED]  "potassium chloride 10 mEq in 100 mL intermittent infusion with 10 mg lidocaine   [DISCONTINUED] potassium chloride 20 mEq in 50 mL intermittent infusion   [DISCONTINUED] HYDROmorphone (PF) (DILAUDID) injection 0.3-0.5 mg   [DISCONTINUED] ondansetron (ZOFRAN-ODT) ODT tab 4 mg   [DISCONTINUED] ondansetron (ZOFRAN) injection 4 mg   [DISCONTINUED] prochlorperazine (COMPAZINE) injection 10 mg   [DISCONTINUED] prochlorperazine (COMPAZINE) tablet 10 mg   [DISCONTINUED] prochlorperazine (COMPAZINE) Suppository 25 mg   [DISCONTINUED] famotidine (PEPCID) injection 20 mg   [DISCONTINUED] ertapenem (INVanz) 1 g in 10 mL SWFI for IVP   [DISCONTINUED] 0.9% sodium chloride infusion   [DISCONTINUED] nicotine Patch in Place   [DISCONTINUED] nicotine patch REMOVAL   [DISCONTINUED] nicotine (NICODERM CQ) 21 MG/24HR 24 hr patch 1 patch     Current Outpatient Prescriptions on File Prior to Encounter:  SUCRALFATE PO Take 1 g by mouth 4 times daily    PANTOPRAZOLE SODIUM PO Take 40 mg by mouth every morning (before breakfast)        ertapenem (INVanz) IV  1 g Intravenous Q24H     famotidine  20 mg Intravenous Q12H     nicotine  1 patch Transdermal Daily     nicotine   Transdermal Q8H     nicotine   Transdermal Daily            Review of Systems:   The 10 point review of systems is negative other than noted in the HPI.          Physical Exam:   BP (!) 179/98  Pulse 115  Temp 99.7  F (37.6  C) (Oral)  Resp 18  Ht 1.88 m (6' 2\")  Wt 104.8 kg (231 lb)  SpO2 95%  BMI 29.66 kg/m2  General - Well developed, well nourished male in no apparent distress  HEENT:  Head normocephalic and atraumatic, pupils equal and round, conjunctivae clear, no scleral icterus, mucous membranes moist, external ears and nose normal  Neck: Supple without thyromegaly or masses  Lymphatic: No cervical, or supraclavicular lymphadenopathy  Lungs: Clear to auscultation bilaterally  Heart: regular rate and rhythm, no murmurs  Abdomen:   soft, rounded, distended with " mild tenderness noted diffusely. no masses palpated  Extremities: Warm without edema  Neurologic: nonfocal  Psychiatric: Mood and affect appropriate  Skin: Without lesions, rashes, or juandice         Data:   WBC -   WBC   Date Value Ref Range Status   12/23/2017 16.9 (H) 4.0 - 11.0 10e9/L Final   ], HgB - Hemoglobin   Date Value Ref Range Status   12/23/2017 13.2 (L) 13.3 - 17.7 g/dL Final   ]   Liver Function Studies -   Recent Labs   Lab Test  12/23/17   0927   PROTTOTAL  5.7*   ALBUMIN  2.7*   BILITOTAL  2.1*   ALKPHOS  237*   AST  143*   ALT  289*         Imaging Studies:  All imaging studies reviewed by me.    Results for orders placed or performed during the hospital encounter of 12/21/17   US Abdomen Complete    Narrative    COMPLETE ABDOMEN ULTRASOUND  12/21/2017 5:47 PM    HISTORY: Abdominal pain.     FINDINGS: Tiny gallstone present dependently within the gallbladder on  12/20/2017 CT is not sonographically visible. No gallbladder wall  thickening. The common bile duct is mildly dilated measuring 0.8 cm in  diameter. The liver, spleen, kidneys, as well as the visualized  portions of the pancreas, abdominal aorta and IVC appear within normal  limits.      Impression    IMPRESSION:    1. Small gallstone seen one day ago by CT is either not  sonographically visible or may have passed out of the gallbladder.  2. Common bile duct dilatation.      GABI DELGADO MD          This note was created using voice recognition software. Undetected word substitutions or other errors may have occurred.     Amber Nunez MD    Time spent with the patient, reviewing the EMR, laboratory and imaging studies, more than 50% of which was counseling and coordinating care:  25 minutes.          no

## 2022-06-28 NOTE — ANESTHESIA POSTPROCEDURE EVALUATION
Patient: Contreras Pearson    Procedure(s):  LAPAROSCOPIC CHOLECYSTECTOMY WITH CHOLANGIOGRAMS  - Wound Class: III-Contaminated    Diagnosis:unknown  Diagnosis Additional Information: gallstone pancreatitis, chronic cholecystitis and no evidence of choledocholithiasis     Anesthesia Type:  General, ETT    Note:  Anesthesia Post Evaluation    Patient location during evaluation: PACU  Patient participation: Able to fully participate in evaluation  Level of consciousness: awake  Pain management: adequate  Airway patency: patent  Cardiovascular status: acceptable  Respiratory status: acceptable  Hydration status: acceptable  PONV: controlled     Anesthetic complications: None          Last vitals:  Vitals:    12/23/17 1016 12/23/17 1353 12/23/17 1546   BP: (!) 179/98 (!) 154/106 148/82   Pulse:      Resp:   17   Temp:  100.9  F (38.3  C) 101.2  F (38.4  C)   SpO2:  94% 100%         Electronically Signed By: Carlos Oviedo DO  December 23, 2017  3:54 PM  
Patient/Caregiver provided printed discharge information.

## 2022-11-21 ENCOUNTER — HEALTH MAINTENANCE LETTER (OUTPATIENT)
Age: 57
End: 2022-11-21

## 2023-04-16 ENCOUNTER — HEALTH MAINTENANCE LETTER (OUTPATIENT)
Age: 58
End: 2023-04-16

## 2023-06-16 ENCOUNTER — HOSPITAL ENCOUNTER (EMERGENCY)
Facility: CLINIC | Age: 58
Discharge: HOME | End: 2023-06-16
Attending: EMERGENCY MEDICINE | Admitting: EMERGENCY MEDICINE
Payer: OTHER MISCELLANEOUS

## 2023-06-16 VITALS
DIASTOLIC BLOOD PRESSURE: 109 MMHG | TEMPERATURE: 98.5 F | BODY MASS INDEX: 30.46 KG/M2 | HEIGHT: 75 IN | SYSTOLIC BLOOD PRESSURE: 169 MMHG | HEART RATE: 71 BPM | OXYGEN SATURATION: 100 % | WEIGHT: 245 LBS | RESPIRATION RATE: 20 BRPM

## 2023-06-16 DIAGNOSIS — S61.511A LACERATION OF RIGHT WRIST, INITIAL ENCOUNTER: ICD-10-CM

## 2023-06-16 PROCEDURE — 12001 RPR S/N/AX/GEN/TRNK 2.5CM/<: CPT

## 2023-06-16 PROCEDURE — 99283 EMERGENCY DEPT VISIT LOW MDM: CPT

## 2023-06-16 RX ORDER — LIDOCAINE HYDROCHLORIDE AND EPINEPHRINE 10; 10 MG/ML; UG/ML
INJECTION, SOLUTION INFILTRATION; PERINEURAL
Status: DISCONTINUED
Start: 2023-06-16 | End: 2023-06-16 | Stop reason: HOSPADM

## 2023-06-16 NOTE — ED TRIAGE NOTES
Pt was a work, CP flexible, changing a blade on a piece of equipment. The wrench slipped and pt caught his wrist on the blade. Pt had a 2 cm laceration on inside of right wrist. Bleeding is controlled with pressure.      Triage Assessment     Row Name 06/16/23 0847       Triage Assessment (Adult)    Airway WDL WDL       Respiratory WDL    Respiratory WDL WDL       Skin Circulation/Temperature WDL    Skin Circulation/Temperature WDL --  2 cm laceration on right wrist       Cardiac WDL    Cardiac WDL WDL       Peripheral/Neurovascular WDL    Peripheral Neurovascular WDL WDL       Cognitive/Neuro/Behavioral WDL    Cognitive/Neuro/Behavioral WDL WDL              
5

## 2023-06-16 NOTE — ED PROVIDER NOTES
History     Chief Complaint:  Laceration       HPI   Contreras Pearson is a 57 year old male RHDwho presents to the ER for evaluation of right wrist laceration.  Last tetanus booster per [facility] 2017.  Patient reports he was using a wrench to unscrew bolts holding table saw down.  His wrench slipped and his ribs caught the edge of the serrated sawblade.  He noticed bleeding and put pressure right away.  Reports some tingling to the medial forearm.  Denies any numbness/tingling or weakness to the fingers.      Independent Historian:   None - Patient Only    Review of External Notes:   None       Medications:    acetaminophen (TYLENOL) 325 MG tablet  diazepam (VALIUM) 2 MG tablet  oxyCODONE (ROXICODONE) 5 MG tablet  sildenafil (VIAGRA) 100 MG tablet        Past Medical History:    Past Medical History:   Diagnosis Date     Other chronic pain      Pancreatic disease        Past Surgical History:    Past Surgical History:   Procedure Laterality Date     COLONOSCOPY N/A 3/2/2022    Procedure: Intraoperative colonoscopy and exam under anesthesia with hemorrhoidectomy.;  Surgeon: Raquel Hughes MD;  Location:  OR     ESOPHAGOSCOPY, GASTROSCOPY, DUODENOSCOPY (EGD), COMBINED N/A 12/22/2017    Procedure: COMBINED ENDOSCOPIC ULTRASOUND, ESOPHAGOSCOPY, GASTROSCOPY, DUODENOSCOPY (EGD);  ENDOSCOPIC ULTRASOUND;  Surgeon: Raeann Brito MD;  Location:  OR     HEMORRHOIDECTOMY EXTERNAL N/A 3/2/2022    Procedure: Three quadrant HEMORRHOIDECTOMY;  Surgeon: Raquel Hughes MD;  Location:  OR     LAPAROSCOPIC CHOLECYSTECTOMY WITH CHOLANGIOGRAMS N/A 12/23/2017    Procedure: LAPAROSCOPIC CHOLECYSTECTOMY WITH CHOLANGIOGRAMS;  LAPAROSCOPIC CHOLECYSTECTOMY WITH CHOLANGIOGRAMS ;  Surgeon: Amber Nunez MD;  Location:  OR        Physical Exam     Patient Vitals for the past 24 hrs:   BP Temp Temp src Pulse Resp SpO2 Height Weight   06/16/23 0849 (!) 168/110 98.5  F (36.9  C) Temporal 80 20 99 % -- --  "  23 0847 -- -- -- -- -- -- 1.905 m (6' 3\") 111.1 kg (245 lb)        Physical Exam  General: the patient is awake and interactive  HEENT:  Moist mucous membranes, conjunctiva normal  Pulmonary:  Normal respiratory effort  Cardiovascular:  Well perfused  Musculoskeletal:  Moving 4 extremities grossly wnl, no deformities  Right hand:  Able to flex/extend all digits against resistance; sensation intact to light touch to median/ulnar/radial nerve distributions. 2 cm vertical laceration to the volar aspect of the lateral wrist/forearm. No bleeding. 2+ radial and ulnar pulses. Capillary refill < 2 seconds.   Neuro:  Speech normal, no focal deficits      Emergency Department Course     Imaging:  No orders to display       Laboratory:  Labs Ordered and Resulted from Time of ED Arrival to Time of ED Departure - No data to display     Procedures     Laceration Repair      Procedure: Laceration Repair    Indication: Laceration    Consent: Verbal    Location: Right Wrist     Length: 2 cm    Preparation: Irrigation with Sterile Saline.    Anesthesia/Sedation: Lidocaine with Epinephrine - 1%      Treatment/Exploration: Wound explored, no foreign bodies found     Closure: The wound was closed with one layer. Skin/superficial layer was closed with FOUR x 3-0 Nylon using Interrupted sutures.     Patient Status: The patient tolerated the procedure well: Yes. There were no complications.      Emergency Department Course & Assessments:    Interventions:  Medications   lidocaine 1% with EPINEPHrine 1:100,000 1 %-[id number] injection (has no administration in time range)        Assessments:  11:23 AM - I evaluated the patient.     Independent Interpretation (X-rays, CTs, rhythm strip):  None    Consultations/Discussion of Management or Tests:  None      Social Determinants of Health affecting care:   None    Disposition:  The patient was discharged to home.     Impression & Plan      Medical Decision Makin-year-old RHD male " presenting to the ER for evaluation of right wrist laceration sustained at work.  Please above for details of HPI and exam.  He is CMS intact to the affected extremity.  There is no clinical signs of neurovascular or tendon injury.  No signs of foreign body.  The wound was anesthetized, cleansed, closed close approximation as noted above.  Discussed risks of scar formation and infection with the patient.  He is safe to discharge home at this time with suture removal in 10 days as discussed at bedside.  Discussed return precautions.  All questions answered prior to discharge.  Last tetanus booster 2017 per [facility]      Diagnosis:    ICD-10-CM    1. Laceration of right wrist, initial encounter  S61.511A            Discharge Medications:  New Prescriptions    No medications on file          Pavan De La Garza MD  6/16/2023        Pavan De La Garza MD  06/16/23 1239

## 2024-06-22 ENCOUNTER — HEALTH MAINTENANCE LETTER (OUTPATIENT)
Age: 59
End: 2024-06-22

## 2025-07-12 ENCOUNTER — HEALTH MAINTENANCE LETTER (OUTPATIENT)
Age: 60
End: 2025-07-12

## (undated) DEVICE — SYR 10ML FINGER CONTROL W/O NDL 309695

## (undated) DEVICE — GLOVE PROTEXIS POWDER FREE SMT 6.5  2D72PT65X

## (undated) DEVICE — ESU CORD MONOPOLAR 10'  E0510

## (undated) DEVICE — ESU ELEC BLADE 2.75" COATED/INSULATED E1455

## (undated) DEVICE — PACK MINOR CUSTOM RIDGES SBA32RMRMA

## (undated) DEVICE — GLOVE PROTEXIS W/NEU-THERA 7.0  2D73TE70

## (undated) DEVICE — SYR BULB IRRIG 50ML LATEX FREE 0035280

## (undated) DEVICE — SU VICRYL 4-0 PS-2 18" UND J496H

## (undated) DEVICE — SOL NACL 0.9% INJ 250ML BAG 2B1322Q

## (undated) DEVICE — GOWN XLG DISP 9545

## (undated) DEVICE — CONNECTOR STOPCOCK 3 WAY MALE LL HI-FLO MX9311L

## (undated) DEVICE — ENDO CANNULA 05MM VERSAONE UNIVERSAL UNVCA5STF

## (undated) DEVICE — KIT PROCEDURE W/CLEAN-A-SCOPE LINERS V2 200800

## (undated) DEVICE — DRAPE LEGGINGS 8421

## (undated) DEVICE — ESU GROUND PAD ADULT W/CORD E7507

## (undated) DEVICE — LINEN FULL SHEET 5511

## (undated) DEVICE — SUCTION MANIFOLD NEPTUNE 2 SYS 4 PORT 0702-020-000

## (undated) DEVICE — DRAIN JACKSON PRATT 15FR ROUND SIL LF JP-2229

## (undated) DEVICE — TAPE CLOTH ADHESIVE 3" LATEX FREE

## (undated) DEVICE — NDL CANNULA INTERLINK BLUNT 18GA

## (undated) DEVICE — TUBING SUCTION 12"X1/4" N612

## (undated) DEVICE — ENDO POUCH GOLD 10MM ECATCH 173050G

## (undated) DEVICE — SOL NACL 0.9% IRRIG 3000ML BAG 2B7477

## (undated) DEVICE — SUCTION IRR STRYKERFLOW II W/TIP 250-070-520

## (undated) DEVICE — DRAPE C-ARM 60X42" 1013

## (undated) DEVICE — LINEN TOWEL PACK X10 5473

## (undated) DEVICE — LINEN TOWEL PACK X5 5464

## (undated) DEVICE — RAD RX ISOVUE 300 (50ML) 61% IOPAMIDOL CHARGE PER ML

## (undated) DEVICE — PREP POVIDONE IODINE SOLUTION 10% 4OZ BOTTLE 29906-004

## (undated) DEVICE — Device

## (undated) DEVICE — SUCTION CANISTER MEDIVAC LINER 3000ML W/LID 65651-530

## (undated) DEVICE — SU VICRYL 3-0 SH 27" J316H

## (undated) DEVICE — BAG CLEAR TRASH 1.3M 39X33" P4040C

## (undated) DEVICE — LINEN HALF SHEET 5512

## (undated) DEVICE — DRSG GAUZE 4X4" 3033

## (undated) DEVICE — DECANTER BAG 2002S

## (undated) DEVICE — GLOVE PROTEXIS BLUE W/NEU-THERA 7.0  2D73EB70

## (undated) DEVICE — SU VICRYL 0 CT-2 CR 8X18" J727D

## (undated) DEVICE — SUCTION TIP YANKAUER W/O VENT K86

## (undated) DEVICE — CATH CHOLANGIOGRAM KUMAR CC-019

## (undated) DEVICE — SYR 30ML LL W/O NDL 302832

## (undated) DEVICE — GLOVE PROTEXIS POWDER FREE 8.0 ORTHOPEDIC 2D73ET80

## (undated) DEVICE — SOL WATER IRRIG 500ML BOTTLE 2F7113

## (undated) DEVICE — ESU PENCIL W/HOLSTER E2350H

## (undated) DEVICE — GLOVE PROTEXIS BLUE W/NEU-THERA 6.5  2D73EB65

## (undated) DEVICE — BLADE CLIPPER SGL USE 9680

## (undated) DEVICE — PAD PERI INDIV WRAP 11" 2022A

## (undated) DEVICE — SOL NACL 0.9% IRRIG 1000ML BOTTLE 2F7124

## (undated) DEVICE — DRAPE LAP PEDS DISP 29492

## (undated) DEVICE — LINEN POUCH DBL 5427

## (undated) DEVICE — PANTIES MESH LG/XLG 2PK 706M2

## (undated) DEVICE — GLOVE PROTEXIS W/NEU-THERA 8.0  2D73TE80

## (undated) DEVICE — ENDO TROCAR 05MM VERSAONE BLADELESS W/STD FIX CAN NONB5STF

## (undated) DEVICE — CLIP APPLIER ENDO 05MM MED/LG 176630

## (undated) DEVICE — ENDO TROCAR BLUNT 100MM W/THRD ANCHOR BLUNTPORT BPT12STS

## (undated) DEVICE — PAD CHUX UNDERPAD 30X36" P3036C

## (undated) DEVICE — PREP CHLORAPREP 26ML TINTED ORANGE  260815

## (undated) DEVICE — PREP SKIN SCRUB TRAY 4461A

## (undated) DEVICE — TUBING SUCTION MEDI-VAC SOFT 3/16"X20' N520A

## (undated) RX ORDER — METOPROLOL TARTRATE 1 MG/ML
INJECTION, SOLUTION INTRAVENOUS
Status: DISPENSED
Start: 2017-12-23

## (undated) RX ORDER — FENTANYL CITRATE 50 UG/ML
INJECTION, SOLUTION INTRAMUSCULAR; INTRAVENOUS
Status: DISPENSED
Start: 2017-12-22

## (undated) RX ORDER — BUPIVACAINE HYDROCHLORIDE 2.5 MG/ML
INJECTION, SOLUTION EPIDURAL; INFILTRATION; INTRACAUDAL
Status: DISPENSED
Start: 2017-12-23

## (undated) RX ORDER — PROPOFOL 10 MG/ML
INJECTION, EMULSION INTRAVENOUS
Status: DISPENSED
Start: 2022-03-02

## (undated) RX ORDER — FENTANYL CITRATE 50 UG/ML
INJECTION, SOLUTION INTRAMUSCULAR; INTRAVENOUS
Status: DISPENSED
Start: 2017-12-23

## (undated) RX ORDER — LIDOCAINE HYDROCHLORIDE 10 MG/ML
INJECTION, SOLUTION EPIDURAL; INFILTRATION; INTRACAUDAL; PERINEURAL
Status: DISPENSED
Start: 2017-12-23

## (undated) RX ORDER — PROPOFOL 10 MG/ML
INJECTION, EMULSION INTRAVENOUS
Status: DISPENSED
Start: 2017-12-22

## (undated) RX ORDER — PROPOFOL 10 MG/ML
INJECTION, EMULSION INTRAVENOUS
Status: DISPENSED
Start: 2017-12-23

## (undated) RX ORDER — OXYCODONE HYDROCHLORIDE 5 MG/1
TABLET ORAL
Status: DISPENSED
Start: 2022-03-02

## (undated) RX ORDER — DEXAMETHASONE SODIUM PHOSPHATE 4 MG/ML
INJECTION, SOLUTION INTRA-ARTICULAR; INTRALESIONAL; INTRAMUSCULAR; INTRAVENOUS; SOFT TISSUE
Status: DISPENSED
Start: 2017-12-23

## (undated) RX ORDER — GLYCOPYRROLATE 0.2 MG/ML
INJECTION INTRAMUSCULAR; INTRAVENOUS
Status: DISPENSED
Start: 2017-12-23

## (undated) RX ORDER — ACETAMINOPHEN 325 MG/1
TABLET ORAL
Status: DISPENSED
Start: 2022-03-02

## (undated) RX ORDER — ONDANSETRON 2 MG/ML
INJECTION INTRAMUSCULAR; INTRAVENOUS
Status: DISPENSED
Start: 2017-12-23

## (undated) RX ORDER — FENTANYL CITRATE 50 UG/ML
INJECTION, SOLUTION INTRAMUSCULAR; INTRAVENOUS
Status: DISPENSED
Start: 2022-03-02